# Patient Record
Sex: FEMALE | HISPANIC OR LATINO | Employment: FULL TIME | URBAN - METROPOLITAN AREA
[De-identification: names, ages, dates, MRNs, and addresses within clinical notes are randomized per-mention and may not be internally consistent; named-entity substitution may affect disease eponyms.]

---

## 2017-01-13 ENCOUNTER — OFFICE VISIT (OUTPATIENT)
Dept: MEDICAL GROUP | Facility: PHYSICIAN GROUP | Age: 22
End: 2017-01-13
Payer: COMMERCIAL

## 2017-01-13 VITALS
BODY MASS INDEX: 22.2 KG/M2 | SYSTOLIC BLOOD PRESSURE: 98 MMHG | HEIGHT: 60 IN | RESPIRATION RATE: 24 BRPM | HEART RATE: 72 BPM | TEMPERATURE: 98.1 F | DIASTOLIC BLOOD PRESSURE: 66 MMHG | WEIGHT: 113.1 LBS | OXYGEN SATURATION: 97 %

## 2017-01-13 DIAGNOSIS — E55.9 VITAMIN D DEFICIENCY: ICD-10-CM

## 2017-01-13 DIAGNOSIS — F32.89 OTHER DEPRESSION: ICD-10-CM

## 2017-01-13 DIAGNOSIS — L60.0 INGROWN NAIL: ICD-10-CM

## 2017-01-13 PROCEDURE — 99214 OFFICE O/P EST MOD 30 MIN: CPT | Performed by: FAMILY MEDICINE

## 2017-01-13 NOTE — MR AVS SNAPSHOT
Lucy Al   2017 10:00 AM   Office Visit   MRN: [id number]    Department:  [facility]   Dept Phone:  [phone]    Description:  Female : 1995   Provider:  Zoraida Chavez M.D.           Reason for Visit     Follow-Up review lab work       Allergies as of 2017     No Known Allergies      You were diagnosed with     Other depression   [2020886]       Vitamin D deficiency   [4644806]       Ingrown nail   [772720]         Vital Signs     Blood Pressure Pulse Temperature Respirations Height Weight    98/66 mmHg 72 36.7 °C (98.1 °F) 24 1.524 m (5') 51.3 kg (113 lb 1.5 oz)    Body Mass Index Oxygen Saturation Last Menstrual Period Smoking Status          22.09 kg/m2 97% 2016 Light Tobacco Smoker        Basic Information     Date Of Birth Sex Race Ethnicity Preferred Language    1995 Female  or   Origin (Swedish,Tristanian,Zambian,Honduran, etc) English      Your appointments     2017 10:00 AM   Established Patient with Zoraida Chavez M.D.   [facility] (--)    [address]  [address]  [place] [id number] [phone]           You will be receiving a confirmation call a few days before your appointment from our automated call confirmation system.            Mar 17, 2017  1:00 PM   Established Patient with Zoraida Chavez M.D.   [facility] (--)    [address]  [address]  [place] [id number] [phone]           You will be receiving a confirmation call a few days before your appointment from our automated call confirmation system.              Problem List              ICD-10-CM Priority Class Noted - Resolved    Depression F32.9   2010 - Present    Family history of bipolar disorder Z81.8   2010 - Present    History of substance abuse Z87.898   2016 - Present    Vitamin D deficiency E55.9   2017 - Present      Health Maintenance        Date Due Completion Dates    IMM HPV VACCINE  (1 of 3 - Female 3 Dose Series) 9/18/2006 ---    IMM PNEUMOCOCCAL 19-64 (ADULT) MEDIUM RISK SERIES (1 of 1 - PPSV23) 9/18/2014 ---    PAP SMEAR 9/18/2016 ---    IMM DTaP/Tdap/Td Vaccine (7 - Td) 10/9/2018 10/9/2008, 10/22/1999, 10/29/1996, 3/25/1996, 1/15/1996, 1995            Current Immunizations     Dtap Vaccine 10/22/1999, 10/29/1996, 3/25/1996, 1/15/1996, 1995    HIB Vaccine(PEDVAX) 10/29/1996, 3/25/1996, 1/15/1996, 1995    Hepatitis A Vaccine, Adult 4/15/2015, 10/9/2008    Hepatitis A Vaccine, Ped/Adol 10/9/2008    Hepatitis B Vaccine Non-Recombivax (Ped/Adol) 1995, 1995    Hepatitis B Vaccine Recombivax (Adol/Adult) 3/31/2015    INFLUENZA VACCINE H1N1 12/29/2009    Influenza LAIV (Nasal) 12/29/2009    Influenza TIV (IM) 10/25/2016, 12/8/2015, 3/30/2015, 12/19/2013    Influenza Vaccine Quad Inj (Pf) 10/25/2016    Influenza Vaccine Quad Inj (Preserved) 12/8/2015, 3/30/2015    MMR Vaccine 10/22/1999, 10/29/1996    OPV - Historical Data 10/22/1999, 3/25/1996, 1/15/1996, 1995    Tdap Vaccine 10/9/2008      Below and/or attached are the medications your provider expects you to take. Review all of your home medications and newly ordered medications with your provider and/or pharmacist. Follow medication instructions as directed by your provider and/or pharmacist. Please keep your medication list with you and share with your provider. Update the information when medications are discontinued, doses are changed, or new medications (including over-the-counter products) are added; and carry medication information at all times in the event of emergency situations     Allergies:  No Known Allergies          Medications  Valid as of: January 13, 2017 -  9:55 AM    Generic Name Brand Name Tablet Size Instructions for use    Cholecalciferol (Tab) Cholecalciferol 21378 UNITS Take 1 tablet by mouth every 7 days.        .                 Medicines prescribed today were sent to:     [id number] [place] [facility], [place] - [address]    [address] [facility] [place] [facility] [place] [address]    Phone: [phone] Fax: [phone]    Open 24 Hours?: No      Medication refill instructions:       If your prescription bottle indicates you have medication refills left, it is not necessary to call your provider’s office. Please contact your pharmacy and they will refill your medication.    If your prescription bottle indicates you do not have any refills left, you may request refills at any time through one of the following ways: The online [employer] system (except Urgent Care), by calling your provider’s office, or by asking your pharmacy to contact your provider’s office with a refill request. Medication refills are processed only during regular business hours and may not be available until the next business day. Your provider may request additional information or to have a follow-up visit with you prior to refilling your medication.   *Please Note: Medication refills are assigned a new Rx number when refilled electronically. Your pharmacy may indicate that no refills were authorized even though a new prescription for the same medication is available at the pharmacy. Please request the medicine by name with the pharmacy before contacting your provider for a refill.        Your To Do List     Future Labs/Procedures Complete By Expires    VITAMIN D,25 HYDROXY  As directed 1/14/2018      Instructions    Vitamin D Deficiency  Vitamin D is an important vitamin that your body needs. Having too little of it in your body is called a deficiency. A very bad deficiency can make your bones soft and can cause a condition called rickets.   Vitamin D is important to your body for different reasons, such as:   · It helps your body absorb 2 minerals called calcium and phosphorus.  · It helps make your bones healthy.  · It may prevent some diseases, such as diabetes and multiple sclerosis.  · It helps your muscles and heart.  You can get vitamin D in  several ways. It is a natural part of some foods. The vitamin is also added to some dairy products and cereals. Some people take vitamin D supplements. Also, your body makes vitamin D when you are in the sun. It changes the sun's rays into a form of the vitamin that your body can use.  CAUSES   · Not eating enough foods that contain vitamin D.  · Not getting enough sunlight.  · Having certain digestive system diseases that make it hard to absorb vitamin D. These diseases include Crohn's disease, chronic pancreatitis, and cystic fibrosis.  · Having a surgery in which part of the stomach or small intestine is removed.  · Being obese. Fat cells pull vitamin D out of your blood. That means that obese people may not have enough vitamin D left in their blood and in other body tissues.  · Having chronic kidney or liver disease.  RISK FACTORS  Risk factors are things that make you more likely to develop a vitamin D deficiency. They include:  · Being older.  · Not being able to get outside very much.  · Living in a nursing home.  · Having had broken bones.  · Having weak or thin bones (osteoporosis).  · Having a disease or condition that changes how your body absorbs vitamin D.  · Having dark skin.  · Some medicines such as seizure medicines or steroids.  · Being overweight or obese.  SYMPTOMS  Mild cases of vitamin D deficiency may not have any symptoms. If you have a very bad case, symptoms may include:  · Bone pain.  · Muscle pain.  · Falling often.  · Broken bones caused by a minor injury, due to osteoporosis.  DIAGNOSIS  A blood test is the best way to tell if you have a vitamin D deficiency.  TREATMENT  Vitamin D deficiency can be treated in different ways. Treatment for vitamin D deficiency depends on what is causing it. Options include:  · Taking vitamin D supplements.  · Taking a calcium supplement. Your caregiver will suggest what dose is best for you.  HOME CARE INSTRUCTIONS  · Take any supplements that your  caregiver prescribes. Follow the directions carefully. Take only the suggested amount.  · Have your blood tested 2 months after you start taking supplements.  · Eat foods that contain vitamin D. Healthy choices include:  ¨ Fortified dairy products, cereals, or juices. Fortified means vitamin D has been added to the food. Check the label on the package to be sure.  ¨ Fatty fish like salmon or trout.  ¨ Eggs.  ¨ Oysters.  · Do not use a tanning bed.  · Keep your weight at a healthy level. Lose weight if you need to.  · Keep all follow-up appointments. Your caregiver will need to perform blood tests to make sure your vitamin D deficiency is going away.  SEEK MEDICAL CARE IF:  · You have any questions about your treatment.  · You continue to have symptoms of vitamin D deficiency.  · You have nausea or vomiting.  · You are constipated.  · You feel confused.  · You have severe abdominal or back pain.  MAKE SURE YOU:  · Understand these instructions.  · Will watch your condition.  · Will get help right away if you are not doing well or get worse.     This information is not intended to replace advice given to you by your health care provider. Make sure you discuss any questions you have with your health care provider.     Document Released: 03/11/2013 Document Revised: 04/14/2014 Document Reviewed: 03/11/2013  [employer] Interactive Patient Education ©2016 Elsevier Inc.            [employer] Access Code: Activation code not generated  Current [employer] Status: Active

## 2017-01-13 NOTE — PROGRESS NOTES
"Subjective:   Lucy Al is a 21 y.o. female here today for follow-up depression and discuss lab results.    Depression  Stable. Patient describes her mood as \"good\" today. No SI/HI. Her parents are very supportive. Not interested in therapy, medications or substance abuse treatment programs.    Vitamin D deficiency  Vitamin D level at 10. Patient reports that she has had low vitamin D levels in the past. She has been taking over-the-counter supplement. No falls or fracture history.     Ingrown nail  Near the end of the visit, patient asks me to look at a nail on her right hand. She clipped her fingernails last night and thinks she may have cut one \"too short.\" Denies any discharge, fevers or vomiting. The tip of her right finger is tender to touch.    Current medicines (including changes today)  Current Outpatient Prescriptions   Medication Sig Dispense Refill   • Cholecalciferol 58170 UNITS Tab Take 1 tablet by mouth every 7 days. 4 Tab 1     No current facility-administered medications for this visit.     She  has a past medical history of Depression and Pericarditis.    ROS   No chest pain, no shortness of breath, no abdominal pain.     Objective:     Physical Exam:  Blood pressure 98/66, pulse 72, temperature 36.7 °C (98.1 °F), resp. rate 24, height 1.524 m (5'), weight 51.3 kg (113 lb 1.5 oz), last menstrual period 12/01/2016, SpO2 97 %. Body mass index is 22.09 kg/(m^2).   Constitutional: Alert, no distress.  Skin: Warm, dry, good turgor, no rashes in visible areas.  Eye: Conjunctiva clear, lids normal.  ENMT: Lips without lesions, good dentition, oropharynx clear.  Neck: Trachea midline, no masses, no thyromegaly.  Respiratory: Unlabored respiratory effort, lungs clear to auscultation, no wheezes, no ronchi.  Cardiovascular: Normal S1, S2, no murmur, no edema.  MSK: Right hand with short nails that are angled at the edges. Lateral edge of right third digit is slightly swollen. No expressible " drainage.  Psych: Alert and oriented x3, normal affect and mood.    Assessment and Plan:     1. Other depression  Stable. Denies any suicidal or homicidal ideation. Patient denies counseling or medication. Emphasized importance of healthy diet and exercise. Discussed that should the patient have any symptoms they should call suicide prevention hotline or report to the emergency room immediately.    2. Vitamin D deficiency  This is a new finding. Vitamin D levels are rather low. Will replete with cholecalciferol 50,000 I.U. weekly for 6-8 weeks. Repeat vitamin D level in 6 weeks.  - Cholecalciferol 97878 UNITS Tab; Take 1 tablet by mouth every 7 days.  Dispense: 4 Tab; Refill: 1  - VITAMIN D,25 HYDROXY; Future    3. Ingrown nail  Discussed appropriate nail hygiene. No signs of infection and therefore antibiotics are not indicated. Supportive care. If not improved, may need procedure.    Followup: Return in about 2 months (around 3/13/2017) for PAP.         PLEASE NOTE: This dictation was created using voice recognition software. I have made every reasonable attempt to correct obvious errors, but I expect that there are errors of grammar and possibly content that I did not discover before finalizing the note.

## 2017-01-13 NOTE — ASSESSMENT & PLAN NOTE
"Stable. Patient describes her mood as \"good\" today. No SI/HI. Her parents are very supportive. Not interested in therapy, medications or substance abuse treatment programs.  "

## 2017-01-13 NOTE — PATIENT INSTRUCTIONS
Vitamin D Deficiency  Vitamin D is an important vitamin that your body needs. Having too little of it in your body is called a deficiency. A very bad deficiency can make your bones soft and can cause a condition called rickets.   Vitamin D is important to your body for different reasons, such as:   · It helps your body absorb 2 minerals called calcium and phosphorus.  · It helps make your bones healthy.  · It may prevent some diseases, such as diabetes and multiple sclerosis.  · It helps your muscles and heart.  You can get vitamin D in several ways. It is a natural part of some foods. The vitamin is also added to some dairy products and cereals. Some people take vitamin D supplements. Also, your body makes vitamin D when you are in the sun. It changes the sun's rays into a form of the vitamin that your body can use.  CAUSES   · Not eating enough foods that contain vitamin D.  · Not getting enough sunlight.  · Having certain digestive system diseases that make it hard to absorb vitamin D. These diseases include Crohn's disease, chronic pancreatitis, and cystic fibrosis.  · Having a surgery in which part of the stomach or small intestine is removed.  · Being obese. Fat cells pull vitamin D out of your blood. That means that obese people may not have enough vitamin D left in their blood and in other body tissues.  · Having chronic kidney or liver disease.  RISK FACTORS  Risk factors are things that make you more likely to develop a vitamin D deficiency. They include:  · Being older.  · Not being able to get outside very much.  · Living in a nursing home.  · Having had broken bones.  · Having weak or thin bones (osteoporosis).  · Having a disease or condition that changes how your body absorbs vitamin D.  · Having dark skin.  · Some medicines such as seizure medicines or steroids.  · Being overweight or obese.  SYMPTOMS  Mild cases of vitamin D deficiency may not have any symptoms. If you have a very bad case, symptoms  may include:  · Bone pain.  · Muscle pain.  · Falling often.  · Broken bones caused by a minor injury, due to osteoporosis.  DIAGNOSIS  A blood test is the best way to tell if you have a vitamin D deficiency.  TREATMENT  Vitamin D deficiency can be treated in different ways. Treatment for vitamin D deficiency depends on what is causing it. Options include:  · Taking vitamin D supplements.  · Taking a calcium supplement. Your caregiver will suggest what dose is best for you.  HOME CARE INSTRUCTIONS  · Take any supplements that your caregiver prescribes. Follow the directions carefully. Take only the suggested amount.  · Have your blood tested 2 months after you start taking supplements.  · Eat foods that contain vitamin D. Healthy choices include:  ¨ Fortified dairy products, cereals, or juices. Fortified means vitamin D has been added to the food. Check the label on the package to be sure.  ¨ Fatty fish like salmon or trout.  ¨ Eggs.  ¨ Oysters.  · Do not use a tanning bed.  · Keep your weight at a healthy level. Lose weight if you need to.  · Keep all follow-up appointments. Your caregiver will need to perform blood tests to make sure your vitamin D deficiency is going away.  SEEK MEDICAL CARE IF:  · You have any questions about your treatment.  · You continue to have symptoms of vitamin D deficiency.  · You have nausea or vomiting.  · You are constipated.  · You feel confused.  · You have severe abdominal or back pain.  MAKE SURE YOU:  · Understand these instructions.  · Will watch your condition.  · Will get help right away if you are not doing well or get worse.     This information is not intended to replace advice given to you by your health care provider. Make sure you discuss any questions you have with your health care provider.     Document Released: 03/11/2013 Document Revised: 04/14/2014 Document Reviewed: 03/11/2013  [employer] Interactive Patient Education ©2016 [employer] Inc.

## 2017-01-13 NOTE — ASSESSMENT & PLAN NOTE
Vitamin D level at 10. Patient reports that she has had low vitamin D levels in the past. She has been taking over-the-counter supplement. No falls or fracture history.

## 2017-03-08 RX ORDER — ERGOCALCIFEROL 1.25 MG/1
CAPSULE ORAL
Qty: 4 CAP | Refills: 0 | OUTPATIENT
Start: 2017-03-08

## 2017-03-17 ENCOUNTER — HOSPITAL ENCOUNTER (OUTPATIENT)
Facility: MEDICAL CENTER | Age: 22
End: 2017-03-17
Attending: FAMILY MEDICINE
Payer: COMMERCIAL

## 2017-03-17 ENCOUNTER — HOSPITAL ENCOUNTER (OUTPATIENT)
Dept: LAB | Facility: MEDICAL CENTER | Age: 22
End: 2017-03-17
Attending: FAMILY MEDICINE
Payer: COMMERCIAL

## 2017-03-17 ENCOUNTER — OFFICE VISIT (OUTPATIENT)
Dept: MEDICAL GROUP | Facility: PHYSICIAN GROUP | Age: 22
End: 2017-03-17
Payer: COMMERCIAL

## 2017-03-17 VITALS
BODY MASS INDEX: 21.1 KG/M2 | HEIGHT: 61 IN | HEART RATE: 74 BPM | WEIGHT: 111.77 LBS | TEMPERATURE: 98.8 F | OXYGEN SATURATION: 96 % | DIASTOLIC BLOOD PRESSURE: 54 MMHG | RESPIRATION RATE: 20 BRPM | SYSTOLIC BLOOD PRESSURE: 92 MMHG

## 2017-03-17 DIAGNOSIS — N89.8 VAGINAL DISCHARGE: ICD-10-CM

## 2017-03-17 DIAGNOSIS — Z12.4 CERVICAL CANCER SCREENING: ICD-10-CM

## 2017-03-17 DIAGNOSIS — Z01.419 WELL WOMAN EXAM WITH ROUTINE GYNECOLOGICAL EXAM: ICD-10-CM

## 2017-03-17 DIAGNOSIS — E55.9 VITAMIN D DEFICIENCY: ICD-10-CM

## 2017-03-17 LAB
25(OH)D3 SERPL-MCNC: 36 NG/ML (ref 30–100)
CYTOLOGY REG CYTOL: NORMAL

## 2017-03-17 PROCEDURE — 99395 PREV VISIT EST AGE 18-39: CPT | Performed by: FAMILY MEDICINE

## 2017-03-17 PROCEDURE — 87480 CANDIDA DNA DIR PROBE: CPT

## 2017-03-17 PROCEDURE — 88175 CYTOPATH C/V AUTO FLUID REDO: CPT

## 2017-03-17 PROCEDURE — 87510 GARDNER VAG DNA DIR PROBE: CPT

## 2017-03-17 PROCEDURE — 82306 VITAMIN D 25 HYDROXY: CPT

## 2017-03-17 PROCEDURE — 87660 TRICHOMONAS VAGIN DIR PROBE: CPT

## 2017-03-17 PROCEDURE — 36415 COLL VENOUS BLD VENIPUNCTURE: CPT

## 2017-03-17 NOTE — MR AVS SNAPSHOT
"        Lucy Al   3/17/2017 1:00 PM   Office Visit   MRN: [id number]    Department:  [facility]   Dept Phone:  [phone]    Description:  Female : 1995   Provider:  Zoraida Chavez M.D.           Reason for Visit     Gynecologic Exam           Allergies as of 3/17/2017     No Known Allergies      You were diagnosed with     Well woman exam with routine gynecological exam   [440790]       Cervical cancer screening   [390675]       Vaginal discharge   [449865]       Vitamin D deficiency   [6707815]         Vital Signs     Blood Pressure Pulse Temperature Respirations Height Weight    92/54 mmHg 74 37.1 °C (98.8 °F) 20 1.537 m (5' 0.5\") 50.7 kg (111 lb 12.4 oz)    Body Mass Index Oxygen Saturation Smoking Status             21.46 kg/m2 96% Light Tobacco Smoker         Basic Information     Date Of Birth Sex Race Ethnicity Preferred Language    1995 Female  or   Origin (Congolese,Prydeinig,Singaporean,Trinidadian, etc) English      Your appointments     Mar 19, 2018  1:00 PM   ANNUAL EXAM PREVENTATIVE with Zoraida Chavez M.D.   [facility] (--)    [address]  [address]  [facility] [id number] [phone]              Problem List              ICD-10-CM Priority Class Noted - Resolved    Depression F32.9   2010 - Present    Family history of bipolar disorder Z81.8   2010 - Present    History of substance abuse Z87.898   2016 - Present    Vitamin D deficiency E55.9   2017 - Present      Health Maintenance        Date Due Completion Dates    IMM HPV VACCINE (1 of 3 - Female 3 Dose Series) 2006 ---    PAP SMEAR 2016 ---    IMM DTaP/Tdap/Td Vaccine (7 - Td) 10/9/2018 10/9/2008, 10/22/1999, 10/29/1996, 3/25/1996, 1/15/1996, 1995            Current Immunizations     Dtap Vaccine 10/22/1999, 10/29/1996, 3/25/1996, 1/15/1996, 1995    HIB Vaccine(PEDVAX) 10/29/1996, 3/25/1996, 1/15/1996, 1995    Hepatitis A Vaccine, " Adult 4/15/2015, 10/9/2008    Hepatitis A Vaccine, Ped/Adol 10/9/2008    Hepatitis B Vaccine Non-Recombivax (Ped/Adol) 1995, 1995    Hepatitis B Vaccine Recombivax (Adol/Adult) 3/31/2015    INFLUENZA VACCINE H1N1 12/29/2009    Influenza LAIV (Nasal) 12/29/2009    Influenza TIV (IM) 10/25/2016, 12/8/2015, 3/30/2015, 12/19/2013    Influenza Vaccine Quad Inj (Pf) 10/25/2016    Influenza Vaccine Quad Inj (Preserved) 12/8/2015, 3/30/2015    MMR Vaccine 10/22/1999, 10/29/1996    OPV - Historical Data 10/22/1999, 3/25/1996, 1/15/1996, 1995    Tdap Vaccine 10/9/2008      Below and/or attached are the medications your provider expects you to take. Review all of your home medications and newly ordered medications with your provider and/or pharmacist. Follow medication instructions as directed by your provider and/or pharmacist. Please keep your medication list with you and share with your provider. Update the information when medications are discontinued, doses are changed, or new medications (including over-the-counter products) are added; and carry medication information at all times in the event of emergency situations     Allergies:  No Known Allergies          Medications  Valid as of: March 17, 2017 -  1:31 PM    Generic Name Brand Name Tablet Size Instructions for use    .                 Medicines prescribed today were sent to:     [id number] [place], [place] - [address] [address] [facility] [address]    Phone: [phone] Fax: [phone]    Open 24 Hours?: No      Medication refill instructions:       If your prescription bottle indicates you have medication refills left, it is not necessary to call your provider’s office. Please contact your pharmacy and they will refill your medication.    If your prescription bottle indicates you do not have any refills left, you may request refills at any time through one of the following ways: The online [employer] system (except Urgent Care), by  calling your provider’s office, or by asking your pharmacy to contact your provider’s office with a refill request. Medication refills are processed only during regular business hours and may not be available until the next business day. Your provider may request additional information or to have a follow-up visit with you prior to refilling your medication.   *Please Note: Medication refills are assigned a new Rx number when refilled electronically. Your pharmacy may indicate that no refills were authorized even though a new prescription for the same medication is available at the pharmacy. Please request the medicine by name with the pharmacy before contacting your provider for a refill.        Your To Do List     Future Labs/Procedures Complete By Expires    THINPREP PAP, REFLEX HPV ON ASC-US AND ABOVE  As directed 3/17/2018    VAGINAL PATHOGENS DNA PANEL  As directed 3/17/2018         [employer] Access Code: Activation code not generated  Current [employer] Status: Active          Quit Tobacco Information     Do you want to quit using tobacco?    Quitting tobacco decreases risks of cancer, heart and lung disease, increases life expectancy, improves sense of taste and smell, and increases spending money, among other benefits.    If you are thinking about quitting, we can help.  • [employer] Quit Tobacco Program: [phone]  o Program occurs weekly for four weeks and includes pharmacist consultation on products to support quitting smoking or chewing tobacco. A provider referral is needed for pharmacist consultation.  • Tobacco Users Help Hotline: 8-188-QUIT-NOW (040-2588) or https://[place].quitlogix.org/  o Free, confidential telephone and online coaching for [place] residents. Sessions are designed on a schedule that is convenient for you. Eligible clients receive free nicotine replacement therapy.  • Nationally: www.smokefree.gov  o Information and professional assistance to support both immediate and long-term needs as  you become, and remain, a non-smoker. Smokefree.gov allows you to choose the help that best fits your needs.

## 2017-03-17 NOTE — PROGRESS NOTES
Subjective:     CC:   Chief Complaint   Patient presents with   • Gynecologic Exam     HPI:   Lucy Al is a 21 y.o. female who presents for annual exam. She is feeling well and denies any complaints.    Patient has GYN provider: no  Last pap: due  Last mammo: N/A  Last colonoscopy: N/A  Last bone density test: n/a  Last Tdap: October 2008  Gardiasil: unsure  Hx. STD's: no  Birth control: no    Menses every month with 5 days light bleeding.  Cramping is mild.   She does take OTC analgesics for cramps.  No significant bloating/fluid retention, pelvic pain, or dyspareunia. She has noticed vaginal discharge.  No breast tenderness, mass, nipple discharge, changes in size or contour, or abnormal cyclic discomfort.  She does not perform regular self breast exams.  Regular exercise: no   Diet: healthy    She  has a past medical history of Depression and Pericarditis.  She  has no past surgical history on file.    Family History   Problem Relation Age of Onset   • Alcohol/Drug Father      alcoholism   • Psychiatry Father      bipolar disorder       Social History     Social History   • Marital Status: Single     Spouse Name: N/A   • Number of Children: N/A   • Years of Education: N/A     Occupational History   • Not on file.     Social History Main Topics   • Smoking status: Light Tobacco Smoker   • Smokeless tobacco: Never Used      Comment: exposed to 1 smoker   • Alcohol Use: 0.0 oz/week     0 Standard drinks or equivalent per week      Comment: socially   • Drug Use: No      Comment: smoking Oxycontin pills since September, recently quit last week   • Sexual Activity:     Partners: Male     Birth Control/ Protection: Condom     Other Topics Concern   • Not on file     Social History Narrative       Patient Active Problem List    Diagnosis Date Noted   • Vitamin D deficiency 01/13/2017   • History of substance abuse 11/28/2016   • Depression 11/11/2010   • Family history of bipolar disorder 11/11/2010     No  "current outpatient prescriptions on file.     No current facility-administered medications for this visit.     No Known Allergies    Review of Systems   Constitutional: Negative for fever, chills and malaise/fatigue.   HENT: Negative for congestion.    Eyes: Negative for pain.   Respiratory: Negative for cough and shortness of breath.    Cardiovascular: Negative for leg swelling.   Gastrointestinal: Negative for nausea, vomiting, abdominal pain and diarrhea.   Genitourinary: Negative for dysuria and hematuria.   Skin: Negative for rash.   Neurological: Negative for dizziness, focal weakness and headaches.   Endo/Heme/Allergies: Does not bruise/bleed easily.   Psychiatric/Behavioral: Negative for depression.  The patient is not nervous/anxious.      Objective:     BP 92/54 mmHg  Pulse 74  Temp(Src) 37.1 °C (98.8 °F)  Resp 20  Ht 1.537 m (5' 0.5\")  Wt 50.7 kg (111 lb 12.4 oz)  BMI 21.46 kg/m2  SpO2 96%  Body mass index is 21.46 kg/(m^2).  Wt Readings from Last 4 Encounters:   03/17/17 50.7 kg (111 lb 12.4 oz)   01/13/17 51.3 kg (113 lb 1.5 oz)   12/15/16 49.624 kg (109 lb 6.4 oz)   11/28/16 47.3 kg (104 lb 4.4 oz)     Physical Exam:  Constitutional: Well-developed and well-nourished. Not diaphoretic. No distress.   Skin: Skin is warm and dry. No rash noted.  Head: Atraumatic without lesions.  Eyes: Conjunctivae and extraocular motions are normal. Pupils are equal, round, and reactive to light. No scleral icterus.   Ears:  External ears unremarkable. Tympanic membranes clear and intact.  Nose: Nares patent. Septum midline. Turbinates without erythema nor edema. No discharge.   Mouth/Throat: Dentition is good. Tongue normal. Oropharynx is clear and moist. Posterior pharynx without erythema or exudates.  Neck: Supple, trachea midline. Normal range of motion. No thyromegaly present. No lymphadenopathy--cervical or supraclavicular.  Cardiovascular: Regular rate and rhythm, S1 and S2 without murmur, rubs, or gallops. "    Breast: Patient declined.  Abdomen: Soft, non tender, and without distention. Active bowel sounds in all four quadrants. No rebound, guarding, masses or HSM.  : Perineum and external genitalia normal without rash. Vagina with grey-yellow discharge. Cervix without visible lesions or discharge. Bimanual exam not performed.  Extremities: No cyanosis, clubbing, erythema, nor edema. Distal pulses intact and symmetric.   Musculoskeletal: All major joints AROM full in all directions without pain.  Neurological: Alert and oriented x 3. DTRs 2+/3 and symmetric. No cranial nerve deficit. 5/5 myotomes. Sensation intact. Negative Rhomberg.  Psychiatric:  Behavior, mood, and affect are appropriate.    Assessment and Plan:     1. Well woman exam with routine gynecological exam  This is a healthy 21-year-old female who is here for a well woman exam. See below regarding anticipatory guidance.    THINPREP PAP, REFLEX HPV ON ASC-US AND ABOVE   2. Cervical cancer screening  Pap smear obtained today and sent to lab.     THINPREP PAP, REFLEX HPV ON ASC-US AND ABOVE   3. Vaginal discharge  AFFIRM obtained.     VAGINAL PATHOGENS DNA PANEL   4. Vitamin D deficiency  Patient completed 8 weeks of high-dose supplementation. Recheck vitamin D level.      HCM:  Up to date.   Labs per orders.  Immunizations per orders. Patient to check on HPV vaccinations.  Patient counseled about skin care, diet, supplements, prenatal vitamins, safe sex and exercise.    Follow-up: Return in about 1 year (around 3/17/2018) for Annual.

## 2017-03-18 LAB
CANDIDA DNA VAG QL PROBE+SIG AMP: POSITIVE
G VAGINALIS DNA VAG QL PROBE+SIG AMP: POSITIVE
T VAGINALIS DNA VAG QL PROBE+SIG AMP: NEGATIVE

## 2017-03-20 ENCOUNTER — TELEPHONE (OUTPATIENT)
Dept: MEDICAL GROUP | Facility: PHYSICIAN GROUP | Age: 22
End: 2017-03-20

## 2017-03-20 DIAGNOSIS — B37.31 VAGINAL CANDIDIASIS: ICD-10-CM

## 2017-03-20 RX ORDER — FLUCONAZOLE 150 MG/1
150 TABLET ORAL DAILY
Qty: 1 TAB | Refills: 0 | Status: SHIPPED | OUTPATIENT
Start: 2017-03-20 | End: 2017-03-21

## 2017-03-20 NOTE — TELEPHONE ENCOUNTER
Please let patient know her pap smear was normal. However, there was evidence of a yeast infection. I have sent in a prescription for her to take.  Zoraida Chavez M.D.

## 2017-03-20 NOTE — TELEPHONE ENCOUNTER
----- Message from Zoraida Chavez M.D. sent at 3/19/2017  5:27 PM PDT -----  Please let patient know her vitamin D level has increased from 10 to 36. She should continue taking a supplement, but two 1,000 I.U. Capsules.  Zoraida Chavez M.D.

## 2017-06-21 ENCOUNTER — OFFICE VISIT (OUTPATIENT)
Dept: MEDICAL GROUP | Facility: PHYSICIAN GROUP | Age: 22
End: 2017-06-21
Payer: COMMERCIAL

## 2017-06-21 VITALS
SYSTOLIC BLOOD PRESSURE: 102 MMHG | HEIGHT: 61 IN | RESPIRATION RATE: 16 BRPM | TEMPERATURE: 99 F | HEART RATE: 90 BPM | WEIGHT: 103.62 LBS | DIASTOLIC BLOOD PRESSURE: 70 MMHG | OXYGEN SATURATION: 99 % | BODY MASS INDEX: 19.56 KG/M2

## 2017-06-21 DIAGNOSIS — M72.2 PLANTAR FASCIITIS OF RIGHT FOOT: ICD-10-CM

## 2017-06-21 PROCEDURE — 99213 OFFICE O/P EST LOW 20 MIN: CPT | Performed by: FAMILY MEDICINE

## 2017-06-21 NOTE — MR AVS SNAPSHOT
"        Lucy Al   2017 3:40 PM   Office Visit   MRN: [id number]    Department:  [facility]   Dept Phone:  [phone]    Description:  Female : 1995   Provider:  Zoraida Chavez M.D.           Reason for Visit     Foot Pain right foot, since January, no injury      Allergies as of 2017     No Known Allergies      You were diagnosed with     Plantar fasciitis of right foot   [[id number]]         Vital Signs     Blood Pressure Pulse Temperature Respirations Height Weight    102/70 mmHg 90 37.2 °C (99 °F) 16 1.537 m (5' 0.5\") 47 kg (103 lb 9.9 oz)    Body Mass Index Oxygen Saturation Smoking Status             19.90 kg/m2 99% Light Tobacco Smoker         Basic Information     Date Of Birth Sex Race Ethnicity Preferred Language    1995 Female  or   Origin (Citizen of [place],T[place]ian,Chinese,E[place]ian, etc) English      Your appointments     2017  4:00 PM   Established Patient with Zoraida Chavez M.D.   [facility] (--)    [email or web address]  [address]  [place] [id number] [phone]           You will be receiving a confirmation call a few days before your appointment from our automated call confirmation system.            Mar 19, 2018  1:00 PM   ANNUAL EXAM PREVENTATIVE with Zoraida Chavez M.D.   [facility] (--)    [email or web address]  [address]  [place] [id number] [phone]              Problem List              ICD-10-CM Priority Class Noted - Resolved    Depression F32.9   2010 - Present    Family history of bipolar disorder Z81.8   2010 - Present    History of substance abuse Z87.898   2016 - Present    Vitamin D deficiency E55.9   2017 - Present    Plantar fasciitis of right foot M72.2   2017 - Present      Health Maintenance        Date Due Completion Dates    IMM HPV VACCINE (1 of 3 - Female 3 Dose Series) 2006 ---    IMM DTaP/Tdap/Td Vaccine (7 - Td) 10/9/2018 10/9/2008, 10/22/1999, 10/29/1996, " 3/25/1996, 1/15/1996, 1995    PAP SMEAR 3/17/2020 3/17/2017            Current Immunizations     Dtap Vaccine 10/22/1999, 10/29/1996, 3/25/1996, 1/15/1996, 1995    HIB Vaccine(PEDVAX) 10/29/1996, 3/25/1996, 1/15/1996, 1995    Hepatitis A Vaccine, Adult 4/15/2015, 10/9/2008    Hepatitis A Vaccine, Ped/Adol 10/9/2008    Hepatitis B Vaccine Non-Recombivax (Ped/Adol) 1995, 1995    Hepatitis B Vaccine Recombivax (Adol/Adult) 3/31/2015    INFLUENZA VACCINE H1N1 12/29/2009    Influenza LAIV (Nasal) 12/29/2009    Influenza TIV (IM) 10/25/2016, 12/8/2015, 3/30/2015, 12/19/2013    Influenza Vaccine Quad Inj (Pf) 10/25/2016    Influenza Vaccine Quad Inj (Preserved) 12/8/2015, 3/30/2015    MMR Vaccine 10/22/1999, 10/29/1996    OPV - Historical Data 10/22/1999, 3/25/1996, 1/15/1996, 1995    Tdap Vaccine 10/9/2008      Below and/or attached are the medications your provider expects you to take. Review all of your home medications and newly ordered medications with your provider and/or pharmacist. Follow medication instructions as directed by your provider and/or pharmacist. Please keep your medication list with you and share with your provider. Update the information when medications are discontinued, doses are changed, or new medications (including over-the-counter products) are added; and carry medication information at all times in the event of emergency situations     Allergies:  No Known Allergies          Medications  Valid as of: June 21, 2017 -  3:50 PM    Generic Name Brand Name Tablet Size Instructions for use    .                 Medicines prescribed today were sent to:     [id number] - [place] - [address]    [address] [facility] [place] [address]    Phone: [phone] Fax: [phone]    Open 24 Hours?: No      Medication refill instructions:       If your prescription bottle indicates you have medication refills left, it is not necessary to call your provider’s office.  Please contact your pharmacy and they will refill your medication.    If your prescription bottle indicates you do not have any refills left, you may request refills at any time through one of the following ways: The online SCADA Access system (except Urgent Care), by calling your provider’s office, or by asking your pharmacy to contact your provider’s office with a refill request. Medication refills are processed only during regular business hours and may not be available until the next business day. Your provider may request additional information or to have a follow-up visit with you prior to refilling your medication.   *Please Note: Medication refills are assigned a new Rx number when refilled electronically. Your pharmacy may indicate that no refills were authorized even though a new prescription for the same medication is available at the pharmacy. Please request the medicine by name with the pharmacy before contacting your provider for a refill.        Instructions    Plantar Fasciitis  Plantar fasciitis is a painful foot condition that affects the heel. It occurs when the band of tissue that connects the toes to the heel bone (plantar fascia) becomes irritated. This can happen after exercising too much or doing other repetitive activities (overuse injury). The pain from plantar fasciitis can range from mild irritation to severe pain that makes it difficult for you to walk or move. The pain is usually worse in the morning or after you have been sitting or lying down for a while.  CAUSES  This condition may be caused by:  · Standing for long periods of time.  · Wearing shoes that do not fit.  · Doing high-impact activities, including running, aerobics, and ballet.  · Being overweight.  · Having an abnormal way of walking (gait).  · Having tight calf muscles.  · Having high arches in your feet.  · Starting a new athletic activity.  SYMPTOMS  The main symptom of this condition is heel pain. Other symptoms  include:  · Pain that gets worse after activity or exercise.  · Pain that is worse in the morning or after resting.  · Pain that goes away after you walk for a few minutes.  DIAGNOSIS  This condition may be diagnosed based on your signs and symptoms. Your health care provider will also do a physical exam to check for:  · A tender area on the bottom of your foot.  · A high arch in your foot.  · Pain when you move your foot.  · Difficulty moving your foot.  You may also need to have imaging studies to confirm the diagnosis. These can include:  · X-rays.  · Ultrasound.  · MRI.  TREATMENT   Treatment for plantar fasciitis depends on the severity of the condition. Your treatment may include:  · Rest, ice, and over-the-counter pain medicines to manage your pain.  · Exercises to stretch your calves and your plantar fascia.  · A splint that holds your foot in a stretched, upward position while you sleep (night splint).  · Physical therapy to relieve symptoms and prevent problems in the future.  · Cortisone injections to relieve severe pain.  · Extracorporeal shockwave therapy (ESWT) to stimulate damaged plantar fascia with electrical impulses. It is often used as a last resort before surgery.  · Surgery, if other treatments have not worked after 12 months.  HOME CARE INSTRUCTIONS  · Take medicines only as directed by your health care provider.  · Avoid activities that cause pain.  · Roll the bottom of your foot over a bag of ice or a bottle of cold water. Do this for 20 minutes, 3-4 times a day.  · Perform simple stretches as directed by your health care provider.  · Try wearing athletic shoes with air-sole or gel-sole cushions or soft shoe inserts.  · Wear a night splint while sleeping, if directed by your health care provider.  · Keep all follow-up appointments with your health care provider.  PREVENTION   · Do not perform exercises or activities that cause heel pain.  · Consider finding low-impact activities if you  continue to have problems.  · Lose weight if you need to.  The best way to prevent plantar fasciitis is to avoid the activities that aggravate your plantar fascia.  SEEK MEDICAL CARE IF:  · Your symptoms do not go away after treatment with home care measures.  · Your pain gets worse.  · Your pain affects your ability to move or do your daily activities.     This information is not intended to replace advice given to you by your health care provider. Make sure you discuss any questions you have with your health care provider.     Document Released: 09/12/2002 Document Revised: 05/03/2016 Document Reviewed: 10/28/2015  [employer] Interactive Patient Education ©2016 [employer].            Trendyta Access Code: Activation code not generated  Current Trendyta Status: Active          Quit Tobacco Information     Do you want to quit using tobacco?    Quitting tobacco decreases risks of cancer, heart and lung disease, increases life expectancy, improves sense of taste and smell, and increases spending money, among other benefits.    If you are thinking about quitting, we can help.  • [employer] Quit Tobacco Program: 836.890.2388  o Program occurs weekly for four weeks and includes pharmacist consultation on products to support quitting smoking or chewing tobacco. A provider referral is needed for pharmacist consultation.  • Tobacco Users Help Hotline: 5-329-QUIT-NOW (778-2971) or https://[place].quitlogix.org/  o Free, confidential telephone and online coaching for [place] residents. Sessions are designed on a schedule that is convenient for you. Eligible clients receive free nicotine replacement therapy.  • Nationally: www.smokefree.gov  o Information and professional assistance to support both immediate and long-term needs as you become, and remain, a non-smoker. Smokefree.gov allows you to choose the help that best fits your needs.

## 2017-06-21 NOTE — PATIENT INSTRUCTIONS
Plantar Fasciitis  Plantar fasciitis is a painful foot condition that affects the heel. It occurs when the band of tissue that connects the toes to the heel bone (plantar fascia) becomes irritated. This can happen after exercising too much or doing other repetitive activities (overuse injury). The pain from plantar fasciitis can range from mild irritation to severe pain that makes it difficult for you to walk or move. The pain is usually worse in the morning or after you have been sitting or lying down for a while.  CAUSES  This condition may be caused by:  · Standing for long periods of time.  · Wearing shoes that do not fit.  · Doing high-impact activities, including running, aerobics, and ballet.  · Being overweight.  · Having an abnormal way of walking (gait).  · Having tight calf muscles.  · Having high arches in your feet.  · Starting a new athletic activity.  SYMPTOMS  The main symptom of this condition is heel pain. Other symptoms include:  · Pain that gets worse after activity or exercise.  · Pain that is worse in the morning or after resting.  · Pain that goes away after you walk for a few minutes.  DIAGNOSIS  This condition may be diagnosed based on your signs and symptoms. Your health care provider will also do a physical exam to check for:  · A tender area on the bottom of your foot.  · A high arch in your foot.  · Pain when you move your foot.  · Difficulty moving your foot.  You may also need to have imaging studies to confirm the diagnosis. These can include:  · X-rays.  · Ultrasound.  · MRI.  TREATMENT   Treatment for plantar fasciitis depends on the severity of the condition. Your treatment may include:  · Rest, ice, and over-the-counter pain medicines to manage your pain.  · Exercises to stretch your calves and your plantar fascia.  · A splint that holds your foot in a stretched, upward position while you sleep (night splint).  · Physical therapy to relieve symptoms and prevent problems in the  future.  · Cortisone injections to relieve severe pain.  · Extracorporeal shockwave therapy (ESWT) to stimulate damaged plantar fascia with electrical impulses. It is often used as a last resort before surgery.  · Surgery, if other treatments have not worked after 12 months.  HOME CARE INSTRUCTIONS  · Take medicines only as directed by your health care provider.  · Avoid activities that cause pain.  · Roll the bottom of your foot over a bag of ice or a bottle of cold water. Do this for 20 minutes, 3-4 times a day.  · Perform simple stretches as directed by your health care provider.  · Try wearing athletic shoes with air-sole or gel-sole cushions or soft shoe inserts.  · Wear a night splint while sleeping, if directed by your health care provider.  · Keep all follow-up appointments with your health care provider.  PREVENTION   · Do not perform exercises or activities that cause heel pain.  · Consider finding low-impact activities if you continue to have problems.  · Lose weight if you need to.  The best way to prevent plantar fasciitis is to avoid the activities that aggravate your plantar fascia.  SEEK MEDICAL CARE IF:  · Your symptoms do not go away after treatment with home care measures.  · Your pain gets worse.  · Your pain affects your ability to move or do your daily activities.     This information is not intended to replace advice given to you by your health care provider. Make sure you discuss any questions you have with your health care provider.     Document Released: 09/12/2002 Document Revised: 05/03/2016 Document Reviewed: 10/28/2015  [employer] Interactive Patient Education ©2016 [employer]

## 2017-06-21 NOTE — ASSESSMENT & PLAN NOTE
Patient has a had intermittent right foot pain since January. It is located on the bottom of her foot, near her heel. Worse in the morning. Improves throughout the day. Also worse with activity. She has been working more hours lately as some of her coworkers are on vacation. 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    Patient has not tried over-the-counter medications. She has been to see a chiropractor and has been wearing tennis shoes at work.    She is concerned because her right foot pain has been more persistent. She has had to walk with her foot turned out at work. She is also worried because nothing that she is doing at home has helped thus far.

## 2017-06-21 NOTE — Clinical Note
2017      To Whom It May Concern:    Re: Lucy Alatorre Mikaela; : 1995      Lucy is a patient of mine. Her most recent appointment with me was on 2017. She has right foot pain and I recommend that her working hours be limited to 24 hours for the next two weeks.     If you have any questions or concerns, please feel free to contact me at [phone].      Sincerely,        Zoraida Chavze M.D.

## 2017-06-21 NOTE — PROGRESS NOTES
"Subjective:   Lucy Al is a 21 y.o. female here today for right foot pain.    Right foot pain  Patient has a had intermittent right foot pain since January. It is located on the bottom of her foot, near her heel. Worse in the morning. Improves throughout the day. Also worse with activity. No injury or trauma. She has been working more hours lately as some of her coworkers are on vacation. 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    Patient has not tried over-the-counter medications. She has been to see a chiropractor and has been wearing tennis shoes at work.    She is concerned because her right foot pain has been more persistent. She has had to walk with her foot turned out at work. She is also worried because nothing that she is doing at home has helped thus far.     Current medicines (including changes today)  No current outpatient prescriptions on file.     No current facility-administered medications for this visit.     She  has a past medical history of Depression and Pericarditis.    ROS   See HPI. No chest pain, no shortness of breath, no abdominal pain.     Objective:     Physical Exam:  Blood pressure 102/70, pulse 90, temperature 37.2 °C (99 °F), resp. rate 16, height 1.537 m (5' 0.5\"), weight 47 kg (103 lb 9.9 oz), SpO2 99 %. Body mass index is 19.9 kg/(m^2).   Constitutional: Alert, no distress, non-toxic appearance.  Skin: Warm, dry, good turgor, no rashes in visible areas.  Eye: Conjunctiva clear, lids normal.  ENMT: Lips without lesions, good dentition, oropharynx clear.  Neck: Trachea midline, no masses, no thyromegaly.  Respiratory: Unlabored respiratory effort, no cough.  Extremities: No cyanosis, clubbing or edema.  Feet: No deformity or edema. Pain to palpation along sole of right foot, worse just distal to heel. Good ROM.  Psych: Alert and oriented x3, normal affect and mood.    Assessment and Plan:     1. Plantar fasciitis of right foot  New " problem for the patient. Most likely plantar fasciitis given the symptoms. Will refer to physical therapy or podiatry if needed. Gave the patient handout with regard to ice and stretching exercises. Continue to monitor.    Followup: Return in about 2 years (around 6/21/2019) for f/u plantar fasciitis, short.         PLEASE NOTE: This dictation was created using voice recognition software. I have made every reasonable attempt to correct obvious errors, but I expect that there are errors of grammar and possibly content that I did not discover before finalizing the note.

## 2017-07-05 ENCOUNTER — OFFICE VISIT (OUTPATIENT)
Dept: MEDICAL GROUP | Facility: PHYSICIAN GROUP | Age: 22
End: 2017-07-05
Payer: COMMERCIAL

## 2017-07-05 VITALS
OXYGEN SATURATION: 98 % | SYSTOLIC BLOOD PRESSURE: 102 MMHG | BODY MASS INDEX: 20.03 KG/M2 | TEMPERATURE: 98.6 F | HEART RATE: 86 BPM | HEIGHT: 60 IN | DIASTOLIC BLOOD PRESSURE: 74 MMHG | RESPIRATION RATE: 16 BRPM | WEIGHT: 102 LBS

## 2017-07-05 DIAGNOSIS — Z28.21 VACCINATION REFUSED BY PATIENT: ICD-10-CM

## 2017-07-05 DIAGNOSIS — M72.2 PLANTAR FASCIITIS OF RIGHT FOOT: ICD-10-CM

## 2017-07-05 PROCEDURE — 99213 OFFICE O/P EST LOW 20 MIN: CPT | Performed by: FAMILY MEDICINE

## 2017-07-05 NOTE — PROGRESS NOTES
Subjective:   Lucy Al is a 21 y.o. female here today for follow-up plantar fasciitis.    Plantar fasciitis of right foot  Stable. No significant changes. However, her right foot has been bothering her less because she has recently switched positions to unit clerk. She is sitting more and not walking around the hospital as much. Has been taking ibuprofen as needed.     Due for HPV vaccine  Patient is due for the HPV vaccine series. We discussed the indications, benefits and risks. She declines today.    Current medicines (including changes today)  No current outpatient prescriptions on file.     No current facility-administered medications for this visit.     She  has a past medical history of Depression and Pericarditis.    ROS   No chest pain, no shortness of breath, no abdominal pain.     Objective:     Physical Exam:  Blood pressure 102/74, pulse 86, temperature 37 °C (98.6 °F), resp. rate 16, height 1.524 m (5'), weight 46.267 kg (102 lb), last menstrual period 05/05/2017, SpO2 98 %. Body mass index is 19.92 kg/(m^2).   Constitutional: Alert, no distress.  Skin: Warm, dry, good turgor, no rashes in visible areas.  Eye: Equal, round and reactive, conjunctiva clear, lids normal.  ENMT: Lips without lesions, good dentition, oropharynx clear.  Neck: Trachea midline, no masses, no thyromegaly.  Respiratory: Unlabored respiratory effort, no cough.  MSK: Normal gait. ISABEL.  Psych: Alert and oriented x3, normal affect and mood.    Assessment and Plan:     1. Plantar fasciitis of right foot  Stable. Encouraged patient to wear supportive footwear and continue stretching exercises. No indication for podiatry or PT referral at this time.    2. Vaccination refused by patient  Patient declines HPV vaccine series. Information hand-out provided and patient encouraged to call if she changes her mind.    Followup: Return if symptoms worsen or fail to improve.         PLEASE NOTE: This dictation was created using  voice recognition software. I have made every reasonable attempt to correct obvious errors, but I expect that there are errors of grammar and possibly content that I did not discover before finalizing the note.

## 2017-07-05 NOTE — PATIENT INSTRUCTIONS
HPV Vaccine  Questions and Answers  WHAT IS HUMAN PAPILLOMAVIRUS (HPV)?  HPV is a virus that can lead to cervical cancer; vulvar and vaginal cancers; penile cancer; anal cancer and genital warts (warts in the genital areas). More than 1 vaccine is available to help you or your child with protection against HPV. Your caregiver can talk to you about which one might give you the best protection.  WHO SHOULD GET THIS VACCINE?  The HPV vaccine is most effective when given before the onset of sexual activity.  · This vaccine is recommended for girls 11 or 12 years of age. It can be given to girls as young as 9 years old.   · HPV vaccine can be given to males, 9 through 26 years of age, to reduce the likelihood of acquiring genital warts.   · HPV vaccine can be given to males and females aged 9 through 26 years to prevent anal cancer.   HPV vaccine is not generally recommended after age 26, because most individuals have been exposed to the HPV virus by that age.  HOW EFFECTIVE IS THIS VACCINE?   The vaccine is generally effective in preventing cervical; vulvar and vaginal cancers; penile cancer; anal cancer and genital warts caused by 4 types of HPV. The vaccine is less effective in those individuals who are already infected with HPV. This vaccine does not treat existing HPV, genital warts, pre-cancers or cancers.  WILL SEXUALLY ACTIVE INDIVIDUALS BENEFIT FROM THE VACCINE?  Sexually active individuals may still benefit from the vaccine but may get less benefit due to previous HPV exposure.  HOW AND WHEN IS THE VACCINE ADMINISTERED?  The vaccine is given in a series of 3 injections (shots) over a 6 month period in both males and females. The exact timing depends on which specific vaccine your caregiver recommends for you.  IS THE HPV VACCINE SAFE?   The federal government has approved the HPV vaccine as safe and effective. This vaccine was tested in both males and females in many countries around the world. The most common  side effect is soreness at the injection site. Since the drug became approved, there has been some concern about patients passing out after being vaccinated, which has led to a recommendation of a 15 minute waiting period following vaccination. This practice may decrease the small risk of passing out.  Additionally there is a rare risk of anaphylaxis (an allergic reaction) to the vaccine and a risk of a blood clot among individuals with specific risk factors for a blood clot.  DOES THIS VACCINE CONTAIN THIMEROSAL OR MERCURY?  No. There is no thimerosal or mercury in the HPV vaccine. It is made of proteins from the outer coat of the virus (HPV). There is no infectious material in this vaccine.  WILL GIRLS/WOMEN WHO HAVE BEEN VACCINATED STILL NEED CERVICAL CANCER SCREENING?  Yes. There are 3 reasons why women will still need regular cervical cancer screening. First, the vaccine will NOT provide protection against all types of HPV that cause cervical cancer. Vaccinated women will still be at risk for some cancers. Second, some women may not get all required doses of the vaccine (or they may not get them at the recommended times). Therefore, they may not get the vaccine's full benefits. Third, women may not get the full benefit of the vaccine if they receive it after they have already acquired any of the 4 types of HPV.  WILL THE HPV VACCINE BE COVERED BY INSURANCE PLANS?  While some insurance companies may cover the vaccine, others may not. Most large group insurance plans cover the costs of recommended vaccines.  WHAT KIND OF GOVERNMENT PROGRAMS MAY BE AVAILABLE TO COVER HPV VACCINE?  Federal health programs such as Vaccines for Children (VFC) will cover the HPV vaccine. The VFC program provides free vaccines to children and adolescents under 19 years of age, who are either uninsured, Medicaid-eligible,  or . There are over 45,000 sites that provide VFC vaccines including hospital, private  and public clinics. The VFC program also allows children and adolescents to get VFC vaccines through [facility] or [facility] if their private health insurance does not cover the vaccine. Some states also provide free or low-cost vaccines, at public health clinics, to people without health insurance coverage for vaccines.  GENITAL HPV: WHY IS HPV IMPORTANT?  Genital HPV is the most common virus transmitted through genital contact, most often during vaginal and anal sex. About 40 types of HPV can infect the genital areas of men and women. While most HPV types cause no symptoms and go away on their own, some types can cause cervical cancer in women. These types also cause other less common genital cancers, including cancers of the penis, anus, vagina (birth canal), and vulva (area around the opening of the vagina). Other types of HPV can cause genital warts in men and women.  HOW COMMON IS HPV?   · At least 50% of sexually active people will get HPV at some time in their lives. HPV is most common in young women and men who are in their late teens and early 20s.   · Anyone who has ever had genital contact with another person can get HPV. Both men and women can get it and pass it on to their sex partners without realizing it.   IS HPV THE SAME THING AS HIV OR HERPES?  HPV is NOT the same as HIV or Herpes (Herpes simplex virus or HSV). While these are all viruses that can be sexually transmitted, HIV and HSV do not cause the same symptoms or health problems as HPV.  CAN HPV AND ITS ASSOCIATED DISEASES BE TREATED?  There is no treatment for HPV. There are treatments for the health problems that HPV can cause, such as genital warts, cervical cell changes, and cancers of the cervix (lower part of the womb), vulva, vagina and anus.   HOW IS HPV RELATED TO CERVICAL CANCER?  Some types of HPV can infect a woman's cervix and cause the cells to change in an abnormal way. Most of the time, HPV  goes away on its own. When HPV is gone, the cervical cells go back to normal. Sometimes, HPV does not go away. Instead, it lingers (persists) and continues to change the cells on a woman's cervix. These cell changes can lead to cancer over time if they are not treated.  ARE THERE OTHER WAYS TO PREVENT CERVICAL CANCER?  Regular Pap tests and follow-up can prevent most, but not all, cases of cervical cancer. Pap tests can detect cell changes (or pre-cancers) in the cervix before they turn into cancer. Pap tests can also detect most, but not all, cervical cancers at an early, curable stage. Most women diagnosed with cervical cancer have either never had a Pap test, or not had a Pap test in the last 5 years.  There is also an HPV DNA test available for use with the Pap test as part of cervical cancer screening. This test may be ordered for women over 30 or for women who get an unclear (borderline) Pap test result. While this test can tell if a woman has HPV on her cervix, it cannot tell which types of HPV she has.  If the HPV DNA test is negative for HPV DNA, then screening may be done every 3 years. If the HPV DNA test is positive for HPV DNA, then screening should be done every 6 to 12 months.  OTHER QUESTIONS ABOUT THE HPV VACCINE  WHAT HPV TYPES DOES THE VACCINE PROTECT AGAINST?  The HPV vaccine protects against the HPV types that cause most (70%) cervical cancers (types 16 and 18), most (78%) anal cancers (types 16 and 18) and the two HPV types that cause most (90%) genital warts (types 6 and 11).  WHAT DOES THE VACCINE NOT PROTECT AGAINST?   Because the vaccine does not protect against all types of HPV, it will not prevent all cases of cervical cancer, anal cancer, other genital cancers or genital warts. About 30% of cervical cancers are not prevented with vaccination, so it will be important for women to continue screening for cervical cancer (regular Pap tests). Also, the vaccine does not prevent about 10% of  genital warts nor will it prevent other sexually transmitted infections (STIs), including HIV. Therefore, it will still be important for sexually active adults to practice safe sex to reduce exposure to HPV and other STI's.  HOW LONG DOES VACCINE PROTECTION LAST? WILL A BOOSTER SHOT BE NEEDED?  So far, studies have followed women for 5 years and found that they are still protected. Currently, additional (booster) doses are not recommended. More research is being done to find out how long protection will last, and if a booster vaccine is needed years later.   WHY IS THE HPV VACCINE RECOMMENDED AT SUCH A YOUNG AGE?  Ideally, males and females should get the vaccine before they are sexually active since this vaccine is most effective in individuals who have not yet acquired any of the HPV vaccine types. Individuals who have not been infected with any of the 4 types of HPV will get the full benefits of the vaccine.   SHOULD PREGNANT WOMEN BE VACCINATED?  The vaccine is not recommended for pregnant women. There has been limited research looking at vaccine safety for pregnant women and their developing fetus. Studies suggest that the vaccine has not caused health problems during pregnancy, nor has it caused health problems for the infant. Pregnant women should complete their pregnancy before getting the vaccine. If a woman finds out she is pregnant after she has started getting the vaccine series, she should complete her pregnancy before finishing the 3 doses.  SHOULD BREASTFEEDING MOTHERS BE VACCINATED?  Mothers nursing their babies may get the vaccine because the virus is inactivated and will not harm the mother or baby.  WILL INDIVIDUALS BE PROTECTED AGAINST HPV AND RELATED DISEASES, EVEN IF THEY DO NOT GET ALL 3 DOSES?  It is not yet known how much protection individuals will get from receiving only 1 or 2 doses of the vaccine. For this reason, it is very important that individuals get all 3 doses of the  vaccine.  WILL CHILDREN BE REQUIRED TO BE VACCINATED TO ENTER SCHOOL?  There are no federal laws that require children or adolescents to get vaccinated. All school entry laws are state laws so they vary from state to state. To find out what vaccines are needed for children or adolescents to enter school in your state, check with your state health department or board of education.  ARE THERE OTHER WAYS TO PREVENT HPV?  The only sure way to prevent HPV is to abstain from all sexual activity. Sexually active adults can reduce their risk by being in a mutually monogamous relationship with someone who has had no other sex partners. But even individuals with only 1 lifetime sex partner can get HPV, if their partner has had a previous partner with HPV.  It is unknown how much protection condoms provide against HPV, since areas that are not covered by a condom can be exposed to the virus. However, condoms may reduce the risk of genital warts and cervical cancer. They can also reduce the risk of HIV and some other sexually transmitted infections (STIs), when used consistently and correctly (all the time and the right way).  Document Released: 12/18/2006 Document Revised: 03/11/2013 Document Reviewed: 08/13/2010  [employer]® Patient Information ©2013 [email or web address].

## 2017-07-05 NOTE — MR AVS SNAPSHOT
"        Lucy Al   2017 4:00 PM   Office Visit   MRN: [id number]    Department:  [facility]   Dept Phone:  [phone]    Description:  Female : 1995   Provider:  Zoraida Chavez M.D.           Reason for Visit     Follow-Up \" I am here today to follow up on my plantar fasciitis\"    Immunizations \" Do I need HPV vaccine? \"      Allergies as of 2017     No Known Allergies      You were diagnosed with     Plantar fasciitis of right foot   [[id number]]         Vital Signs     Blood Pressure Pulse Temperature Respirations Height Weight    102/74 mmHg 86 37 °C (98.6 °F) 16 1.524 m (5') 46.267 kg (102 lb)    Body Mass Index Oxygen Saturation Last Menstrual Period Smoking Status          19.92 kg/m2 98% 2017 Light Tobacco Smoker        Basic Information     Date Of Birth Sex Race Ethnicity Preferred Language    1995 Female  or   Origin (Togolese,Italian,Belarusian,[place] Helenian, etc) English      Your appointments     Mar 19, 2018  1:00 PM   ANNUAL EXAM PREVENTATIVE with Zoraida Chavez M.D.   [facility] (--)    [address]  [address]  [place] [id number] [phone]              Problem List              ICD-10-CM Priority Class Noted - Resolved    Depression F32.9   2010 - Present    Family history of bipolar disorder Z81.8   2010 - Present    History of substance abuse Z87.898   2016 - Present    Vitamin D deficiency E55.9   2017 - Present    Plantar fasciitis of right foot M72.2   2017 - Present      Health Maintenance        Date Due Completion Dates    IMM HPV VACCINE (1 of 3 - Female 3 Dose Series) 2018 (Originally 2006) ---    IMM INFLUENZA (1) 2017 10/25/2016, 10/25/2016, 2015, 2015, 3/30/2015, 3/30/2015, 2013, 2009    IMM DTaP/Tdap/Td Vaccine (7 - Td) 10/9/2018 10/9/2008, 10/22/1999, 10/29/1996, 3/25/1996, 1/15/1996, 1995    PAP SMEAR 3/17/2020 3/17/2017            Current " Immunizations     Dtap Vaccine 10/22/1999, 10/29/1996, 3/25/1996, 1/15/1996, 1995    HIB Vaccine(PEDVAX) 10/29/1996, 3/25/1996, 1/15/1996, 1995    Hepatitis A Vaccine, Adult 4/15/2015, 10/9/2008    Hepatitis A Vaccine, Ped/Adol 10/9/2008    Hepatitis B Vaccine Non-Recombivax (Ped/Adol) 1995, 1995    Hepatitis B Vaccine Recombivax (Adol/Adult) 3/31/2015    INFLUENZA VACCINE H1N1 12/29/2009    Influenza LAIV (Nasal) 12/29/2009    Influenza TIV (IM) 10/25/2016, 12/8/2015, 3/30/2015, 12/19/2013    Influenza Vaccine Quad Inj (Pf) 10/25/2016    Influenza Vaccine Quad Inj (Preserved) 12/8/2015, 3/30/2015    MMR Vaccine 10/22/1999, 10/29/1996    OPV - Historical Data 10/22/1999, 3/25/1996, 1/15/1996, 1995    Tdap Vaccine 10/9/2008      Below and/or attached are the medications your provider expects you to take. Review all of your home medications and newly ordered medications with your provider and/or pharmacist. Follow medication instructions as directed by your provider and/or pharmacist. Please keep your medication list with you and share with your provider. Update the information when medications are discontinued, doses are changed, or new medications (including over-the-counter products) are added; and carry medication information at all times in the event of emergency situations     Allergies:  No Known Allergies          Medications  Valid as of: July 05, 2017 -  4:05 PM    Generic Name Brand Name Tablet Size Instructions for use    .                 Medicines prescribed today were sent to:     [id number] - [place], [place] - [address]    [address] [place] [address]    Phone: [phone] Fax: [phone]    Open 24 Hours?: No      Medication refill instructions:       If your prescription bottle indicates you have medication refills left, it is not necessary to call your provider’s office. Please contact your pharmacy and they will refill your medication.    If your  prescription bottle indicates you do not have any refills left, you may request refills at any time through one of the following ways: The online [facility] system (except Urgent Care), by calling your provider’s office, or by asking your pharmacy to contact your provider’s office with a refill request. Medication refills are processed only during regular business hours and may not be available until the next business day. Your provider may request additional information or to have a follow-up visit with you prior to refilling your medication.   *Please Note: Medication refills are assigned a new Rx number when refilled electronically. Your pharmacy may indicate that no refills were authorized even though a new prescription for the same medication is available at the pharmacy. Please request the medicine by name with the pharmacy before contacting your provider for a refill.        Instructions    HPV Vaccine  Questions and Answers  WHAT IS HUMAN PAPILLOMAVIRUS (HPV)?  HPV is a virus that can lead to cervical cancer; vulvar and vaginal cancers; penile cancer; anal cancer and genital warts (warts in the genital areas). More than 1 vaccine is available to help you or your child with protection against HPV. Your caregiver can talk to you about which one might give you the best protection.  WHO SHOULD GET THIS VACCINE?  The HPV vaccine is most effective when given before the onset of sexual activity.  · This vaccine is recommended for girls 11 or 12 years of age. It can be given to girls as young as 9 years old.   · HPV vaccine can be given to males, 9 through 26 years of age, to reduce the likelihood of acquiring genital warts.   · HPV vaccine can be given to males and females aged 9 through 26 years to prevent anal cancer.   HPV vaccine is not generally recommended after age 26, because most individuals have been exposed to the HPV virus by that age.  HOW EFFECTIVE IS THIS VACCINE?   The vaccine is generally effective in  preventing cervical; vulvar and vaginal cancers; penile cancer; anal cancer and genital warts caused by 4 types of HPV. The vaccine is less effective in those individuals who are already infected with HPV. This vaccine does not treat existing HPV, genital warts, pre-cancers or cancers.  WILL SEXUALLY ACTIVE INDIVIDUALS BENEFIT FROM THE VACCINE?  Sexually active individuals may still benefit from the vaccine but may get less benefit due to previous HPV exposure.  HOW AND WHEN IS THE VACCINE ADMINISTERED?  The vaccine is given in a series of 3 injections (shots) over a 6 month period in both males and females. The exact timing depends on which specific vaccine your caregiver recommends for you.  IS THE HPV VACCINE SAFE?   The federal government has approved the HPV vaccine as safe and effective. This vaccine was tested in both males and females in many countries around the world. The most common side effect is soreness at the injection site. Since the drug became approved, there has been some concern about patients passing out after being vaccinated, which has led to a recommendation of a 15 minute waiting period following vaccination. This practice may decrease the small risk of passing out.  Additionally there is a rare risk of anaphylaxis (an allergic reaction) to the vaccine and a risk of a blood clot among individuals with specific risk factors for a blood clot.  DOES THIS VACCINE CONTAIN THIMEROSAL OR MERCURY?  No. There is no thimerosal or mercury in the HPV vaccine. It is made of proteins from the outer coat of the virus (HPV). There is no infectious material in this vaccine.  WILL GIRLS/WOMEN WHO HAVE BEEN VACCINATED STILL NEED CERVICAL CANCER SCREENING?  Yes. There are 3 reasons why women will still need regular cervical cancer screening. First, the vaccine will NOT provide protection against all types of HPV that cause cervical cancer. Vaccinated women will still be at risk for some cancers. Second, some  women may not get all required doses of the vaccine (or they may not get them at the recommended times). Therefore, they may not get the vaccine's full benefits. Third, women may not get the full benefit of the vaccine if they receive it after they have already acquired any of the 4 types of HPV.  WILL THE HPV VACCINE BE COVERED BY INSURANCE PLANS?  While some insurance companies may cover the vaccine, others may not. Most large group insurance plans cover the costs of recommended vaccines.  WHAT KIND OF GOVERNMENT PROGRAMS MAY BE AVAILABLE TO COVER HPV VACCINE?  Federal health programs such as Vaccines for Children (VFC) will cover the HPV vaccine. The VFC program provides free vaccines to children and adolescents under 19 years of age, who are either uninsured, Medicaid-eligible,  or . There are over 45,000 sites that provide VFC vaccines including hospital, private and public clinics. The VFC program also allows children and adolescents to get VFC vaccines through [facility] or [facility] if their private health insurance does not cover the vaccine. Some states also provide free or low-cost vaccines, at public health clinics, to people without health insurance coverage for vaccines.  GENITAL HPV: WHY IS HPV IMPORTANT?  Genital HPV is the most common virus transmitted through genital contact, most often during vaginal and anal sex. About 40 types of HPV can infect the genital areas of men and women. While most HPV types cause no symptoms and go away on their own, some types can cause cervical cancer in women. These types also cause other less common genital cancers, including cancers of the penis, anus, vagina (birth canal), and vulva (area around the opening of the vagina). Other types of HPV can cause genital warts in men and women.  HOW COMMON IS HPV?   · At least 50% of sexually active people will get HPV at some time in their lives. HPV is most  common in young women and men who are in their late teens and early 20s.   · Anyone who has ever had genital contact with another person can get HPV. Both men and women can get it and pass it on to their sex partners without realizing it.   IS HPV THE SAME THING AS HIV OR HERPES?  HPV is NOT the same as HIV or Herpes (Herpes simplex virus or HSV). While these are all viruses that can be sexually transmitted, HIV and HSV do not cause the same symptoms or health problems as HPV.  CAN HPV AND ITS ASSOCIATED DISEASES BE TREATED?  There is no treatment for HPV. There are treatments for the health problems that HPV can cause, such as genital warts, cervical cell changes, and cancers of the cervix (lower part of the womb), vulva, vagina and anus.   HOW IS HPV RELATED TO CERVICAL CANCER?  Some types of HPV can infect a woman's cervix and cause the cells to change in an abnormal way. Most of the time, HPV goes away on its own. When HPV is gone, the cervical cells go back to normal. Sometimes, HPV does not go away. Instead, it lingers (persists) and continues to change the cells on a woman's cervix. These cell changes can lead to cancer over time if they are not treated.  ARE THERE OTHER WAYS TO PREVENT CERVICAL CANCER?  Regular Pap tests and follow-up can prevent most, but not all, cases of cervical cancer. Pap tests can detect cell changes (or pre-cancers) in the cervix before they turn into cancer. Pap tests can also detect most, but not all, cervical cancers at an early, curable stage. Most women diagnosed with cervical cancer have either never had a Pap test, or not had a Pap test in the last 5 years.  There is also an HPV DNA test available for use with the Pap test as part of cervical cancer screening. This test may be ordered for women over 30 or for women who get an unclear (borderline) Pap test result. While this test can tell if a woman has HPV on her cervix, it cannot tell which types of HPV she has.  If the HPV  DNA test is negative for HPV DNA, then screening may be done every 3 years. If the HPV DNA test is positive for HPV DNA, then screening should be done every 6 to 12 months.  OTHER QUESTIONS ABOUT THE HPV VACCINE  WHAT HPV TYPES DOES THE VACCINE PROTECT AGAINST?  The HPV vaccine protects against the HPV types that cause most (70%) cervical cancers (types 16 and 18), most (78%) anal cancers (types 16 and 18) and the two HPV types that cause most (90%) genital warts (types 6 and 11).  WHAT DOES THE VACCINE NOT PROTECT AGAINST?   Because the vaccine does not protect against all types of HPV, it will not prevent all cases of cervical cancer, anal cancer, other genital cancers or genital warts. About 30% of cervical cancers are not prevented with vaccination, so it will be important for women to continue screening for cervical cancer (regular Pap tests). Also, the vaccine does not prevent about 10% of genital warts nor will it prevent other sexually transmitted infections (STIs), including HIV. Therefore, it will still be important for sexually active adults to practice safe sex to reduce exposure to HPV and other STI's.  HOW LONG DOES VACCINE PROTECTION LAST? WILL A BOOSTER SHOT BE NEEDED?  So far, studies have followed women for 5 years and found that they are still protected. Currently, additional (booster) doses are not recommended. More research is being done to find out how long protection will last, and if a booster vaccine is needed years later.   WHY IS THE HPV VACCINE RECOMMENDED AT SUCH A YOUNG AGE?  Ideally, males and females should get the vaccine before they are sexually active since this vaccine is most effective in individuals who have not yet acquired any of the HPV vaccine types. Individuals who have not been infected with any of the 4 types of HPV will get the full benefits of the vaccine.   SHOULD PREGNANT WOMEN BE VACCINATED?  The vaccine is not recommended for pregnant women. There has been limited  research looking at vaccine safety for pregnant women and their developing fetus. Studies suggest that the vaccine has not caused health problems during pregnancy, nor has it caused health problems for the infant. Pregnant women should complete their pregnancy before getting the vaccine. If a woman finds out she is pregnant after she has started getting the vaccine series, she should complete her pregnancy before finishing the 3 doses.  SHOULD BREASTFEEDING MOTHERS BE VACCINATED?  Mothers nursing their babies may get the vaccine because the virus is inactivated and will not harm the mother or baby.  WILL INDIVIDUALS BE PROTECTED AGAINST HPV AND RELATED DISEASES, EVEN IF THEY DO NOT GET ALL 3 DOSES?  It is not yet known how much protection individuals will get from receiving only 1 or 2 doses of the vaccine. For this reason, it is very important that individuals get all 3 doses of the vaccine.  WILL CHILDREN BE REQUIRED TO BE VACCINATED TO ENTER SCHOOL?  There are no federal laws that require children or adolescents to get vaccinated. All school entry laws are state laws so they vary from state to state. To find out what vaccines are needed for children or adolescents to enter school in your state, check with your state health department or board of education.  ARE THERE OTHER WAYS TO PREVENT HPV?  The only sure way to prevent HPV is to abstain from all sexual activity. Sexually active adults can reduce their risk by being in a mutually monogamous relationship with someone who has had no other sex partners. But even individuals with only 1 lifetime sex partner can get HPV, if their partner has had a previous partner with HPV.  It is unknown how much protection condoms provide against HPV, since areas that are not covered by a condom can be exposed to the virus. However, condoms may reduce the risk of genital warts and cervical cancer. They can also reduce the risk of HIV and some other sexually transmitted infections  (STIs), when used consistently and correctly (all the time and the right way).  Document Released: 12/18/2006 Document Revised: 03/11/2013 Document Reviewed: 08/13/2010  ExitCare® Patient Information ©2013 [employer].          MyChart Access Code: Activation code not generated  Current MyChart Status: Active          Quit Tobacco Information     Do you want to quit using tobacco?    Quitting tobacco decreases risks of cancer, heart and lung disease, increases life expectancy, improves sense of taste and smell, and increases spending money, among other benefits.    If you are thinking about quitting, we can help.  • [facility] Quit Tobacco Program: 831-966-1139  o Program occurs weekly for four weeks and includes pharmacist consultation on products to support quitting smoking or chewing tobacco. A provider referral is needed for pharmacist consultation.  • Tobacco Users Help Hotline: 8-398-QUIT-NOW (156-7903) or https://[place].quitlogix.org/  o Free, confidential telephone and online coaching for [place] residents. Sessions are designed on a schedule that is convenient for you. Eligible clients receive free nicotine replacement therapy.  • Nationally: www.smokefree.gov  o Information and professional assistance to support both immediate and long-term needs as you become, and remain, a non-smoker. Smokefree.gov allows you to choose the help that best fits your needs.

## 2017-10-12 ENCOUNTER — IMMUNIZATION (OUTPATIENT)
Dept: OCCUPATIONAL MEDICINE | Facility: CLINIC | Age: 22
End: 2017-10-12

## 2017-10-12 DIAGNOSIS — Z23 NEED FOR VACCINATION: ICD-10-CM

## 2017-10-12 PROCEDURE — 90686 IIV4 VACC NO PRSV 0.5 ML IM: CPT | Performed by: PREVENTIVE MEDICINE

## 2018-03-19 ENCOUNTER — OFFICE VISIT (OUTPATIENT)
Dept: MEDICAL GROUP | Facility: PHYSICIAN GROUP | Age: 23
End: 2018-03-19
Payer: COMMERCIAL

## 2018-03-19 VITALS
TEMPERATURE: 98.6 F | HEIGHT: 60 IN | HEART RATE: 82 BPM | OXYGEN SATURATION: 99 % | DIASTOLIC BLOOD PRESSURE: 70 MMHG | WEIGHT: 106 LBS | SYSTOLIC BLOOD PRESSURE: 112 MMHG | RESPIRATION RATE: 16 BRPM | BODY MASS INDEX: 20.81 KG/M2

## 2018-03-19 DIAGNOSIS — E55.9 VITAMIN D DEFICIENCY: ICD-10-CM

## 2018-03-19 DIAGNOSIS — Z00.00 WELL WOMAN EXAM WITHOUT GYNECOLOGICAL EXAM: ICD-10-CM

## 2018-03-19 DIAGNOSIS — R25.2 BILATERAL LEG CRAMPS: ICD-10-CM

## 2018-03-19 PROBLEM — M72.2 PLANTAR FASCIITIS OF RIGHT FOOT: Status: RESOLVED | Noted: 2017-06-21 | Resolved: 2018-03-19

## 2018-03-19 PROCEDURE — 99395 PREV VISIT EST AGE 18-39: CPT | Performed by: FAMILY MEDICINE

## 2018-03-19 ASSESSMENT — PATIENT HEALTH QUESTIONNAIRE - PHQ9: CLINICAL INTERPRETATION OF PHQ2 SCORE: 0

## 2018-03-19 ASSESSMENT — PAIN SCALES - GENERAL: PAINLEVEL: NO PAIN

## 2018-03-19 NOTE — PROGRESS NOTES
Subjective:     CC:   Chief Complaint   Patient presents with   • Annual Exam     PE   • Orders Needed     vit d       HPI:   Lucy Al is a 22 y.o. female who presents for annual exam. She is feeling well and denies any complaints.    Patient has GYN provider: no  Last pap: 3/2017, normal  Last mammo: N/A  Last colonoscopy: N/A  Last bone density test: N/A  Last Tdap: 10/2008, UTD  Gardiasil: Patient refuesed  Hx. STD's: No, patient declines screening  Birth control: Condoms, patient is not interested in hormonal birth control    Menses every month with 28 days spotting, light bleeding.  Cramping is none.   She does not take OTC analgesics for cramps.  No significant bloating/fluid retention, pelvic pain, or dyspareunia. No vaginal discharge.  No breast tenderness, mass, nipple discharge, changes in size or contour, or abnormal cyclic discomfort.  She does not perform regular self breast exams.  Regular exercise: Yes   Diet: Healthy    She  has a past medical history of Depression and Pericarditis.  She  has no past surgical history on file.    Family History   Problem Relation Age of Onset   • Alcohol/Drug Father      alcoholism   • Psychiatry Father      bipolar disorder       Social History     Social History   • Marital status: Single     Spouse name: N/A   • Number of children: N/A   • Years of education: N/A     Occupational History   • Not on file.     Social History Main Topics   • Smoking status: Light Tobacco Smoker   • Smokeless tobacco: Never Used      Comment: exposed to 1 smoker   • Alcohol use No   • Drug use: Yes     Types: Marijuana   • Sexual activity: Yes     Partners: Male     Birth control/ protection: Condom     Other Topics Concern   • Not on file     Social History Narrative   • No narrative on file       Patient Active Problem List    Diagnosis Date Noted   • Vitamin D deficiency 01/13/2017   • History of substance abuse 11/28/2016   • Depression 11/11/2010         No current  outpatient prescriptions on file.     No current facility-administered medications for this visit.      No Known Allergies    Review of Systems   Constitutional: Negative for fever, chills and malaise/fatigue.   HENT: Negative for congestion.    Eyes: Negative for pain.   Respiratory: Negative for cough and shortness of breath.    Cardiovascular: Negative for leg swelling.   Gastrointestinal: Negative for nausea, vomiting, abdominal pain and diarrhea.   Genitourinary: Negative for dysuria and hematuria.   Skin: Negative for rash.   Neurological: Negative for dizziness, focal weakness and headaches.   Musculoskeletal: Positive for bilateral lower extremity cramps.  Endo/Heme/Allergies: Does not bruise/bleed easily.   Psychiatric/Behavioral: Negative for depression.  The patient is not nervous/anxious.      Objective:     /70   Pulse 82   Temp 37 °C (98.6 °F)   Resp 16   Ht 1.524 m (5')   Wt 48.1 kg (106 lb)   LMP 03/06/2018   SpO2 99%   Breastfeeding? No   BMI 20.70 kg/m²   Body mass index is 20.7 kg/m².  Wt Readings from Last 4 Encounters:   03/19/18 48.1 kg (106 lb)   07/05/17 46.3 kg (102 lb)   06/21/17 47 kg (103 lb 9.9 oz)   03/17/17 50.7 kg (111 lb 12.4 oz)     Physical Exam:  Constitutional: Well-developed and well-nourished. Not diaphoretic. No distress.   Skin: Skin is warm and dry. No rash noted.  Head: Atraumatic without lesions.  Eyes: Conjunctivae and extraocular motions are normal. Pupils are equal, round, and reactive to light. No scleral icterus.   Ears:  External ears unremarkable. Tympanic membranes clear and intact.  Nose: Nares patent. Septum midline. Turbinates without erythema nor edema. No discharge.   Mouth/Throat: Dentition is good. Tongue normal. Oropharynx is clear and moist. Posterior pharynx without erythema or exudates.  Neck: Supple, trachea midline. Normal range of motion. No thyromegaly present. No lymphadenopathy--cervical or supraclavicular.  Cardiovascular: Regular  rate and rhythm, S1 and S2 without murmur, rubs, or gallops.    Breast: Deferred.  Abdomen: Soft, non tender, and without distention. Active bowel sounds in all four quadrants. No rebound, guarding, masses or HSM.  : Deferred.  Extremities: No cyanosis, clubbing, erythema, nor edema. Distal pulses intact and symmetric.   Musculoskeletal: All major joints AROM full in all directions without pain.  Neurological: Alert and oriented x 3. DTRs 2+/3 and symmetric. No cranial nerve deficit. 5/5 myotomes. Sensation intact. Negative Rhomberg.  Psychiatric:  Behavior, mood, and affect are appropriate.    Assessment and Plan:     1. Well woman exam without gynecological exam  This is a healthy 22-year-old female here today for a preventative exam. Previous medical history, healthcare maintenance and immunization status reviewed. Patient is up to date. Labwork ordered as discussed below. See discussion of anticipatory guidance below. Patient will return annually for preventative exams.   2. Vitamin D deficiency  Stable. On OTC supplement. Recheck level.  VITAMIN D,25 HYDROXY   3. Bilateral leg cramps  This is a new problem. We'll check lab work to look for electrolyte deficiency. Continue to monitor.  BASIC METABOLIC PANEL    MAGNESIUM    VITAMIN D,25 HYDROXY     HCM:  UTD.   Labs per orders.  Immunizations offered and patient declined.  Patient counseled about skin care, diet, supplements, prenatal vitamins, safe sex and exercise.    Follow-up: Return in about 1 year (around 3/19/2019) for Annual, Short.

## 2018-07-24 ENCOUNTER — HOSPITAL ENCOUNTER (OUTPATIENT)
Dept: LAB | Facility: MEDICAL CENTER | Age: 23
End: 2018-07-24
Attending: FAMILY MEDICINE
Payer: COMMERCIAL

## 2018-07-24 DIAGNOSIS — R25.2 BILATERAL LEG CRAMPS: ICD-10-CM

## 2018-07-24 DIAGNOSIS — E55.9 VITAMIN D DEFICIENCY: ICD-10-CM

## 2018-07-24 PROCEDURE — 36415 COLL VENOUS BLD VENIPUNCTURE: CPT

## 2018-07-24 PROCEDURE — 82306 VITAMIN D 25 HYDROXY: CPT

## 2018-07-24 PROCEDURE — 83735 ASSAY OF MAGNESIUM: CPT

## 2018-07-24 PROCEDURE — 80048 BASIC METABOLIC PNL TOTAL CA: CPT

## 2018-07-25 ENCOUNTER — APPOINTMENT (OUTPATIENT)
Dept: MEDICAL GROUP | Facility: PHYSICIAN GROUP | Age: 23
End: 2018-07-25
Payer: COMMERCIAL

## 2018-07-25 LAB
25(OH)D3 SERPL-MCNC: 25 NG/ML (ref 30–100)
ANION GAP SERPL CALC-SCNC: 8 MMOL/L (ref 0–11.9)
BUN SERPL-MCNC: 10 MG/DL (ref 8–22)
CALCIUM SERPL-MCNC: 9.8 MG/DL (ref 8.5–10.5)
CHLORIDE SERPL-SCNC: 105 MMOL/L (ref 96–112)
CO2 SERPL-SCNC: 26 MMOL/L (ref 20–33)
CREAT SERPL-MCNC: 0.8 MG/DL (ref 0.5–1.4)
GLUCOSE SERPL-MCNC: 124 MG/DL (ref 65–99)
MAGNESIUM SERPL-MCNC: 1.9 MG/DL (ref 1.5–2.5)
POTASSIUM SERPL-SCNC: 4.5 MMOL/L (ref 3.6–5.5)
SODIUM SERPL-SCNC: 139 MMOL/L (ref 135–145)

## 2018-08-22 ENCOUNTER — OFFICE VISIT (OUTPATIENT)
Dept: MEDICAL GROUP | Facility: PHYSICIAN GROUP | Age: 23
End: 2018-08-22
Payer: COMMERCIAL

## 2018-08-22 VITALS
BODY MASS INDEX: 21.2 KG/M2 | TEMPERATURE: 98.6 F | HEIGHT: 60 IN | WEIGHT: 108 LBS | OXYGEN SATURATION: 96 % | DIASTOLIC BLOOD PRESSURE: 64 MMHG | SYSTOLIC BLOOD PRESSURE: 102 MMHG | HEART RATE: 86 BPM | RESPIRATION RATE: 16 BRPM

## 2018-08-22 DIAGNOSIS — F33.2 SEVERE EPISODE OF RECURRENT MAJOR DEPRESSIVE DISORDER, WITHOUT PSYCHOTIC FEATURES (HCC): ICD-10-CM

## 2018-08-22 DIAGNOSIS — F19.11 HISTORY OF SUBSTANCE ABUSE (HCC): ICD-10-CM

## 2018-08-22 PROCEDURE — 99214 OFFICE O/P EST MOD 30 MIN: CPT | Performed by: FAMILY MEDICINE

## 2018-08-22 RX ORDER — SERTRALINE HYDROCHLORIDE 25 MG/1
25 TABLET, FILM COATED ORAL DAILY
Qty: 30 TAB | Refills: 0 | Status: SHIPPED | OUTPATIENT
Start: 2018-08-22 | End: 2018-09-05 | Stop reason: SDUPTHER

## 2018-08-22 ASSESSMENT — PATIENT HEALTH QUESTIONNAIRE - PHQ9
9. THOUGHTS THAT YOU WOULD BE BETTER OFF DEAD, OR OF HURTING YOURSELF: 3
4. FEELING TIRED OR HAVING LITTLE ENERGY: 2
SUM OF ALL RESPONSES TO PHQ QUESTIONS 1-9: 22
8. MOVING OR SPEAKING SO SLOWLY THAT OTHER PEOPLE COULD HAVE NOTICED. OR THE OPPOSITE, BEING SO FIGETY OR RESTLESS THAT YOU HAVE BEEN MOVING AROUND A LOT MORE THAN USUAL: 2
3. TROUBLE FALLING OR STAYING ASLEEP OR SLEEPING TOO MUCH: 3
2. FEELING DOWN, DEPRESSED, IRRITABLE, OR HOPELESS: 3
5. POOR APPETITE OR OVEREATING: 3
7. TROUBLE CONCENTRATING ON THINGS, SUCH AS READING THE NEWSPAPER OR WATCHING TELEVISION: 2
1. LITTLE INTEREST OR PLEASURE IN DOING THINGS: 1
SUM OF ALL RESPONSES TO PHQ9 QUESTIONS 1 AND 2: 4
6. FEELING BAD ABOUT YOURSELF - OR THAT YOU ARE A FAILURE OR HAVE LET YOURSELF OR YOUR FAMILY DOWN: 3

## 2018-08-22 ASSESSMENT — PAIN SCALES - GENERAL: PAINLEVEL: NO PAIN

## 2018-08-22 NOTE — PROGRESS NOTES
"Subjective:   Lucy Al is a 22 y.o. female here today for depression.    Patient appears visibly upset today.  At first, she told me that she \"didn't want to talk,\" but opened up later into our visit.  Since her last appointment with me in March, she has been feeling very depressed.  She denies any specific triggers, but has been having a difficult time getting along with her older half-sister.  She has also been drinking more alcohol than she usually does and has had a couple of episodes of blacking out.  Last Friday, she had too much to drink and had an argument with her boyfriend and best friend.  She tells me, \"it is not like me to drink so much.\"  She denies use of other substances.  I asked her specifically about any emotional or physical abuse and she denied this.  She has been having passive thoughts of suicide, but denies any current thoughts.  No current plans or intent.  No homicidal thoughts.  She denies hearing voices or feeling as though someone is watching her.    We reviewed her PHQ 9 screen which show that she is feeling down, having poor appetite and difficulty sleeping.    Patient Health Questionaire  Little interest or pleasure in doing things?: 1   Feeling down, depressed, or hopeless?: 3  Trouble falling or staying asleep, or sleeping too much? : 3  Feeling tired or having little energy? : 2  Poor appetite or overeating? : 3  Feeling bad about yourself - or that you are a failure or have let yourself or your family down? : 3  Trouble concentrating on things, such as reading the newspaper or watching television? : 2  Moving or speaking so slowly that other people could have noticed.  Or the opposite - being so fidgety or restless that you have been moving around alot more than usual. : 2  Thoughts that you would be better off dead, or of hurting yourself?: 3  Patient Health Questionnaire Score: 22    Depression Screen (PHQ-2/PHQ-9) 11/28/2016 3/19/2018 8/22/2018   PHQ-2 Total Score " - - 4   PHQ-2 Total Score 1 0 -   PHQ-9 Total Score - - 22     She has been diagnosed with depression in the past, but never started medication.  She also has a history of substance abuse.  Depression and substance abuse also run in her family.    Current medicines (including changes today)  Current Outpatient Prescriptions   Medication Sig Dispense Refill   • vitamin D (CHOLECALCIFEROL) 1000 UNIT Tab Take 1,000 Units by mouth every day.     • sertraline (ZOLOFT) 25 MG tablet Take 1 Tab by mouth every day. 30 Tab 0     No current facility-administered medications for this visit.      She  has a past medical history of Depression and Pericarditis.    ROS   No headaches, no chest pain, no shortness of breath, no abdominal pain.     Objective:     Physical Exam:  Blood pressure 102/64, pulse 86, temperature 37 °C (98.6 °F), resp. rate 16, height 1.524 m (5'), weight 49 kg (108 lb), SpO2 96 %, not currently breastfeeding. Body mass index is 21.09 kg/m².   Constitutional: Alert, non-toxic appearance. Well-groomed, but wearing a hoodie over her head.  Skin: Warm, dry, good turgor, no rashes in visible areas.  Eye: Equal, round and reactive, conjunctiva clear, lids normal.  ENMT: Lips without lesions, good dentition, oropharynx clear.  Neck: Trachea midline, no masses, no thyromegaly.  Respiratory: Unlabored respiratory effort, no cough.  Psych: Alert and oriented x3, flat affect and mood, withdrawn, avoiding eye contact, crying intermittently.    Assessment and Plan:     1. Severe episode of recurrent major depressive disorder, without psychotic features (HCC)  2. History of substance abuse  Worsening.  Patient is having a severe episode of depression.  Denies any current suicidal or homicidal ideation.  She has a history of depression and substance abuse.  Denies psychosis.  Provided her with the number for the [employer] [facility].  Anti-depressant medication prescribed and urgent referral to counseling  placed.  Patient has signed a no-suicide contract.  Discussed that should the patient have any symptoms they should call suicide prevention hotline or report to the emergency room immediately.  She will have close follow-up with me in two weeks.  - sertraline (ZOLOFT) 25 MG tablet; Take 1 Tab by mouth every day.  Dispense: 30 Tab; Refill: 0  - REFERRAL TO BEHAVIORAL HEALTH    Followup: Return in about 2 weeks (around 9/5/2018) for f/u depression, short.         PLEASE NOTE: This dictation was created using voice recognition software. I have made every reasonable attempt to correct obvious errors, but I expect that there are errors of grammar and possibly content that I did not discover before finalizing the note.

## 2018-09-05 ENCOUNTER — OFFICE VISIT (OUTPATIENT)
Dept: MEDICAL GROUP | Facility: PHYSICIAN GROUP | Age: 23
End: 2018-09-05
Payer: COMMERCIAL

## 2018-09-05 VITALS
WEIGHT: 108.91 LBS | SYSTOLIC BLOOD PRESSURE: 110 MMHG | OXYGEN SATURATION: 97 % | HEART RATE: 82 BPM | BODY MASS INDEX: 21.38 KG/M2 | RESPIRATION RATE: 16 BRPM | HEIGHT: 60 IN | DIASTOLIC BLOOD PRESSURE: 60 MMHG | TEMPERATURE: 97.7 F

## 2018-09-05 DIAGNOSIS — F19.11 HISTORY OF SUBSTANCE ABUSE (HCC): ICD-10-CM

## 2018-09-05 DIAGNOSIS — Z23 NEED FOR VACCINATION: ICD-10-CM

## 2018-09-05 DIAGNOSIS — F33.2 SEVERE EPISODE OF RECURRENT MAJOR DEPRESSIVE DISORDER, WITHOUT PSYCHOTIC FEATURES (HCC): ICD-10-CM

## 2018-09-05 PROCEDURE — 90471 IMMUNIZATION ADMIN: CPT | Performed by: FAMILY MEDICINE

## 2018-09-05 PROCEDURE — 90686 IIV4 VACC NO PRSV 0.5 ML IM: CPT | Performed by: FAMILY MEDICINE

## 2018-09-05 PROCEDURE — 99214 OFFICE O/P EST MOD 30 MIN: CPT | Mod: 25 | Performed by: FAMILY MEDICINE

## 2018-09-05 RX ORDER — SERTRALINE HYDROCHLORIDE 25 MG/1
25 TABLET, FILM COATED ORAL DAILY
Qty: 90 TAB | Refills: 1 | Status: SHIPPED | OUTPATIENT
Start: 2018-09-05 | End: 2018-10-22

## 2018-09-05 NOTE — PROGRESS NOTES
"Subjective:   Lucy Al is a 22 y.o. female here today for follow-up depression.    Since her last appointment approximately 2 weeks ago, Lucy tells me that her mood has \"improved.\"  She has labs episodes of crying.  She does still feel \"worthless.\"  Denies any suicidal or homicidal thoughts.  She has been taking sertraline 25 mg without any adverse effects.  The medication does make her tired at times and she wonders if she can take it at night instead of first thing in the morning.  She has an appointment later today to establish with a therapist.    Current medicines (including changes today)  Current Outpatient Prescriptions   Medication Sig Dispense Refill   • sertraline (ZOLOFT) 25 MG tablet Take 1 Tab by mouth every day. 90 Tab 1   • vitamin D (CHOLECALCIFEROL) 1000 UNIT Tab Take 1,000 Units by mouth every day.       No current facility-administered medications for this visit.      She  has a past medical history of Depression and Pericarditis.    ROS   No chest pain, no shortness of breath, no abdominal pain.     Objective:     Physical Exam:  Blood pressure 110/60, pulse 82, temperature 36.5 °C (97.7 °F), resp. rate 16, height 1.524 m (5'), weight 49.4 kg (108 lb 14.5 oz), SpO2 97 %. Body mass index is 21.27 kg/m².   Constitutional: Alert, no distress.  Skin: Warm, dry, good turgor, no rashes in visible areas.  Eye: Equal, round and reactive, conjunctiva clear, lids normal.  ENMT: Lips without lesions, good dentition, moist mucous membranes.  Neck: Trachea midline, no masses, no thyromegaly.  Respiratory: Unlabored respiratory effort, no cough.  Psych: Alert and oriented x3, normal affect and mood.    Assessment and Plan:     1. Severe episode of recurrent major depressive disorder, without psychotic features (HCC)  2. History of substance abuse  Improved since last appointment, but it is still early.  She denies crisis today and clinically appears better.  Emphasized the need to take " anti-depressant daily and follow-up with therapist. Discussed that should the patient have any symptoms they should call suicide prevention hotline or report to the emergency room immediately.  - sertraline (ZOLOFT) 25 MG tablet; Take 1 Tab by mouth every day.  Dispense: 90 Tab; Refill: 1    3. Need for vaccination  Influenza vaccine discussed and administered in office today.  - INFLUENZA VACCINE QUAD INJ >3Y(PF)    Followup: Return in about 3 months (around 12/5/2018) for f/u depression and medication, short.         PLEASE NOTE: This dictation was created using voice recognition software. I have made every reasonable attempt to correct obvious errors, but I expect that there are errors of grammar and possibly content that I did not discover before finalizing the note.

## 2018-09-10 ENCOUNTER — DOCUMENTATION (OUTPATIENT)
Dept: OCCUPATIONAL MEDICINE | Facility: CLINIC | Age: 23
End: 2018-09-10

## 2018-09-26 ENCOUNTER — EH NON-PROVIDER (OUTPATIENT)
Dept: OCCUPATIONAL MEDICINE | Facility: CLINIC | Age: 23
End: 2018-09-26

## 2018-09-26 DIAGNOSIS — Z02.89 ENCOUNTER FOR OCCUPATIONAL HEALTH EXAMINATION INVOLVING RESPIRATOR: ICD-10-CM

## 2018-09-26 PROCEDURE — 94010 BREATHING CAPACITY TEST: CPT | Performed by: PREVENTIVE MEDICINE

## 2018-09-26 PROCEDURE — 94375 RESPIRATORY FLOW VOLUME LOOP: CPT | Performed by: PREVENTIVE MEDICINE

## 2018-10-05 ENCOUNTER — DOCUMENTATION (OUTPATIENT)
Dept: OCCUPATIONAL MEDICINE | Facility: CLINIC | Age: 23
End: 2018-10-05

## 2018-10-05 NOTE — PROGRESS NOTES
Seen for spirometry on 09/26/18.    Spirometry test are within normal limits for patient's stated age, height, and weight at the time of test. Patient is physically able to perform essential functions based on job title or work conditions defined at the time of evaluation without accommodations as it pertains to their respiratory status.     See scanned documents.

## 2018-10-22 ENCOUNTER — OFFICE VISIT (OUTPATIENT)
Dept: MEDICAL GROUP | Facility: PHYSICIAN GROUP | Age: 23
End: 2018-10-22
Payer: COMMERCIAL

## 2018-10-22 VITALS
SYSTOLIC BLOOD PRESSURE: 100 MMHG | OXYGEN SATURATION: 96 % | BODY MASS INDEX: 21.6 KG/M2 | WEIGHT: 110 LBS | DIASTOLIC BLOOD PRESSURE: 62 MMHG | TEMPERATURE: 98.6 F | HEART RATE: 83 BPM | HEIGHT: 60 IN | RESPIRATION RATE: 18 BRPM

## 2018-10-22 DIAGNOSIS — F33.41 RECURRENT MAJOR DEPRESSIVE DISORDER, IN PARTIAL REMISSION (HCC): ICD-10-CM

## 2018-10-22 DIAGNOSIS — H00.014 HORDEOLUM EXTERNUM OF LEFT UPPER EYELID: ICD-10-CM

## 2018-10-22 DIAGNOSIS — Z23 NEED FOR VACCINATION: ICD-10-CM

## 2018-10-22 PROCEDURE — 90651 9VHPV VACCINE 2/3 DOSE IM: CPT | Performed by: FAMILY MEDICINE

## 2018-10-22 PROCEDURE — 99214 OFFICE O/P EST MOD 30 MIN: CPT | Mod: 25 | Performed by: FAMILY MEDICINE

## 2018-10-22 PROCEDURE — 90471 IMMUNIZATION ADMIN: CPT | Performed by: FAMILY MEDICINE

## 2018-10-22 NOTE — PROGRESS NOTES
"Subjective:   Lucy Al is a 23 y.o. female here today for follow-up depression and possible stye.  She is unaccompanied for today's appointment.    Recurrent major depressive disorder (HCC)  Since her last appointment, patient reports that her depression symptoms have significantly improved.  She tells me, \"I no longer feel sad.\"  She did have an episode where she felt down and isolated herself a few weeks ago.  Thinks it was due to thinking about her ex-boyfriend.  She later increased her sertraline to two tablets and tells me this helped.  She would like to increase her dose.    Denies SI/HI.  We reviewed her PHQ-9 today which is low at 1.    Hordeolum externum of left upper eyelid  This is a new problem.  Patient states that she noticed a \"bump\" on her left upper eyelid a couple of months ago.  Her mother told her to use an antibiotic eye drop.  She had one left over and gave it to her.  The \"bump\" is decreasing in size.  She denies tenderness or drainage.  No vision changes.  No redness around her eyelids.     Current medicines (including changes today)  Current Outpatient Prescriptions   Medication Sig Dispense Refill   • sertraline (ZOLOFT) 50 MG Tab Take 1 Tab by mouth every day. 90 Tab 1   • vitamin D (CHOLECALCIFEROL) 1000 UNIT Tab Take 1,000 Units by mouth every day.       No current facility-administered medications for this visit.      She  has a past medical history of Depression and Pericarditis.    ROS   No chest pain, no shortness of breath, no abdominal pain.     Objective:     Physical Exam:  Blood pressure 100/62, pulse 83, temperature 37 °C (98.6 °F), temperature source Temporal, resp. rate 18, height 1.524 m (5'), weight 49.9 kg (110 lb), SpO2 96 %, not currently breastfeeding. Body mass index is 21.48 kg/m².   Constitutional: Alert, no distress, non-toxic appearance.  Skin: Warm, dry, good turgor, no rashes in visible areas.  Eye: Equal, round and reactive, conjunctiva clear, " +small subcutaneous nodule on left upper eyelid without erythema or expressible drainage.  ENMT: Lips without lesions, good dentition, moist mucous membranes.  Neck: Trachea midline, no masses, no thyromegaly.   Respiratory: Unlabored respiratory effort, no cough.  Abdomen: Soft, no gross masses.  Psych: Alert and oriented x3, normal affect and mood.    Assessment and Plan:     1. Recurrent major depressive disorder, in partial remission (HCC)  Patient is feeling well on current medication at higher dosage.  Will increase sertraline to 50mg daily.  Denies any suicidal or homicidal ideation.  Emphasized importance of healthy diet and exercise.  Discussed that should the patient have any symptoms they should call suicide prevention hotline or report to the emergency room immediately.  - sertraline (ZOLOFT) 50 MG Tab; Take 1 Tab by mouth every day.  Dispense: 90 Tab; Refill: 1    2. Hordeolum externum of left upper eyelid  Improving.  Continue warm compresses and monitor.    3. Need for vaccination  HPV vaccine discussed and administered in office today.  - 9VHPV Vaccine 2-3 Dose IM    Followup: Return in about 6 months (around 4/22/2019) for f/u sertraline, short.         PLEASE NOTE: This dictation was created using voice recognition software. I have made every reasonable attempt to correct obvious errors, but I expect that there are errors of grammar and possibly content that I did not discover before finalizing the note.

## 2018-10-22 NOTE — ASSESSMENT & PLAN NOTE
"Since her last appointment, patient reports that her depression symptoms have significantly improved.  She tells me, \"I no longer feel sad.\"  She did have an episode where she felt down and isolated herself a few weeks ago.  Thinks it was due to thinking about her ex-boyfriend.  She later increased her sertraline to two tablets and tells me this helped.  She would like to increase her dose.    Denies SI/HI.  We reviewed her PHQ-9 today which is low at 1.      "

## 2018-10-22 NOTE — ASSESSMENT & PLAN NOTE
"This is a new problem.  Patient states that she noticed a \"bump\" on her left upper eyelid a couple of months ago.  Her mother told her to use an antibiotic eye drop.  She had one left over and gave it to her.  The \"bump\" is decreasing in size.  She denies tenderness or drainage.  No vision changes.  No redness around her eyelids.  "

## 2019-01-02 DIAGNOSIS — Z23 IMMUNIZATION DUE: ICD-10-CM

## 2019-01-18 DIAGNOSIS — Z23 IMMUNIZATION DUE: ICD-10-CM

## 2019-01-21 ENCOUNTER — APPOINTMENT (OUTPATIENT)
Dept: MEDICAL GROUP | Facility: PHYSICIAN GROUP | Age: 24
End: 2019-01-21
Payer: COMMERCIAL

## 2019-02-12 ENCOUNTER — NON-PROVIDER VISIT (OUTPATIENT)
Dept: MEDICAL GROUP | Facility: PHYSICIAN GROUP | Age: 24
End: 2019-02-12
Payer: COMMERCIAL

## 2019-02-12 DIAGNOSIS — Z23 NEED FOR VACCINATION: ICD-10-CM

## 2019-02-12 PROCEDURE — 90471 IMMUNIZATION ADMIN: CPT | Performed by: FAMILY MEDICINE

## 2019-02-12 PROCEDURE — 90651 9VHPV VACCINE 2/3 DOSE IM: CPT | Performed by: FAMILY MEDICINE

## 2019-02-12 NOTE — PROGRESS NOTES
"Lucy Al is a 23 y.o. female here for a non-provider visit for:   HPV 2 of 3    Reason for immunization: continue or complete series started at the office  Immunization records indicate need for vaccine: Yes, confirmed with Epic  Minimum interval has been met for this vaccine: Yes  ABN completed: Not Indicated    Order and dose verified by: Joi KAY Dated  12/2/16 was given to patient: No  All IAC Questionnaire questions were answered \"No.\"    Patient tolerated injection and no adverse effects were observed or reported: Yes    Pt scheduled for next dose in series: Yes    "

## 2019-04-24 ENCOUNTER — OFFICE VISIT (OUTPATIENT)
Dept: MEDICAL GROUP | Facility: PHYSICIAN GROUP | Age: 24
End: 2019-04-24
Payer: COMMERCIAL

## 2019-04-24 ENCOUNTER — APPOINTMENT (OUTPATIENT)
Dept: MEDICAL GROUP | Facility: PHYSICIAN GROUP | Age: 24
End: 2019-04-24
Payer: COMMERCIAL

## 2019-04-24 VITALS
BODY MASS INDEX: 22.5 KG/M2 | DIASTOLIC BLOOD PRESSURE: 62 MMHG | OXYGEN SATURATION: 96 % | HEART RATE: 80 BPM | TEMPERATURE: 99.6 F | RESPIRATION RATE: 12 BRPM | SYSTOLIC BLOOD PRESSURE: 96 MMHG | WEIGHT: 114.6 LBS | HEIGHT: 60 IN

## 2019-04-24 DIAGNOSIS — F33.1 MODERATE EPISODE OF RECURRENT MAJOR DEPRESSIVE DISORDER (HCC): ICD-10-CM

## 2019-04-24 PROCEDURE — 99213 OFFICE O/P EST LOW 20 MIN: CPT | Performed by: FAMILY MEDICINE

## 2019-04-24 NOTE — PROGRESS NOTES
Subjective:   Lucy Al is a 23 y.o. female here today for depression follow up. She is unaccompanied for today's visit.     Recurrent major depressive disorder, in partial remission (HCC)  This is a chronic and stable problem for Lucy. The patient reports that since she has started taking Zoloft she has been experiencing vivid dreams. These dreams do not appear to be bothersome for her. She feels as if the medication has been helping with her anxiety, although she has recently been very affected by events involving her friends. She would not elaborate further, but it is upsetting to her. She denies worsening depression. Not interested in therapy at this time. Denies suicidal or homicidal thoughts.    She also has questions about marijuana use. She tells me that she has been either smoking or consuming marijuana since she was 14. She asks about a medical marijuana card.    Current medicines (including changes today)  Current Outpatient Prescriptions   Medication Sig Dispense Refill   • sertraline (ZOLOFT) 50 MG Tab Take 1 Tab by mouth every day. 90 Tab 1   • vitamin D (CHOLECALCIFEROL) 1000 UNIT Tab Take 1,000 Units by mouth every day.       No current facility-administered medications for this visit.      She  has a past medical history of Depression and Pericarditis.    ROS   No chest pain, no shortness of breath, no abdominal pain.     Objective:     Physical Exam:  BP (!) 96/62   Pulse 80   Temp 37.6 °C (99.6 °F)   Resp 12   Ht 1.524 m (5')   Wt 52 kg (114 lb 9.6 oz)   SpO2 96%  Body mass index is 22.38 kg/m².   Constitutional: Alert, no distress, well-groomed.  Skin: Warm, dry, good turgor, no rashes in visible areas.  Eye: Equal, round and reactive, conjunctiva clear, lids normal.  ENMT: Lips without lesions, good dentition, moist mucous membranes.  Neck: Trachea midline, no masses, no thyromegaly.  Respiratory: Unlabored respiratory effort, no cough.  Abdomen: Soft, no gross masses.  MSK:  Normal gait, moves all extremities.  Neuro: Grossly non-focal. No cranial nerve deficit. Strength and sensation intact.   Psych: Alert and oriented x3, depressed mood and affect, teary eyed.    Assessment and Plan:     1. Moderate episode of recurrent major depressive disorder (HCC)  Patient is feeling well on current medications.  Will continue.  Denies any suicidal or homicidal ideation.  Emphasized importance of healthy diet and exercise.  Discussed that should the patient have any symptoms they should call suicide prevention hotline or report to the emergency room immediately.  - sertraline (ZOLOFT) 50 MG Tab; Take 1 Tab by mouth every day.  Dispense: 90 Tab; Refill: 1    Followup: Return in about 3 weeks (around 5/15/2019) for check-in, shots, short.          Semaj CAMPOS (Scribe), am scribing for, and in the presence of, Zoraida Chavez MD    Electronically signed by: Semaj Hawthorne (Scribe), 4/24/2019    Zoraida CAMPOS MD personally performed the services described in this documentation, as scribed by Semaj Hawthorne in my presence, and it is both accurate and complete.

## 2019-06-05 DIAGNOSIS — Z23 IMMUNIZATION DUE: ICD-10-CM

## 2019-06-14 ENCOUNTER — OFFICE VISIT (OUTPATIENT)
Dept: MEDICAL GROUP | Facility: PHYSICIAN GROUP | Age: 24
End: 2019-06-14
Payer: COMMERCIAL

## 2019-06-14 VITALS
HEIGHT: 60 IN | SYSTOLIC BLOOD PRESSURE: 100 MMHG | WEIGHT: 109 LBS | OXYGEN SATURATION: 96 % | TEMPERATURE: 98.6 F | RESPIRATION RATE: 16 BRPM | BODY MASS INDEX: 21.4 KG/M2 | DIASTOLIC BLOOD PRESSURE: 72 MMHG | HEART RATE: 62 BPM

## 2019-06-14 DIAGNOSIS — Z23 NEED FOR VACCINATION: ICD-10-CM

## 2019-06-14 DIAGNOSIS — F12.90 MARIJUANA USE: ICD-10-CM

## 2019-06-14 DIAGNOSIS — F33.1 MODERATE EPISODE OF RECURRENT MAJOR DEPRESSIVE DISORDER (HCC): ICD-10-CM

## 2019-06-14 DIAGNOSIS — Z62.820 RELATIONSHIP PROBLEM BETWEEN PARENT AND CHILD: ICD-10-CM

## 2019-06-14 DIAGNOSIS — F19.10 SUBSTANCE ABUSE (HCC): ICD-10-CM

## 2019-06-14 PROCEDURE — 90471 IMMUNIZATION ADMIN: CPT | Performed by: FAMILY MEDICINE

## 2019-06-14 PROCEDURE — 90472 IMMUNIZATION ADMIN EACH ADD: CPT | Performed by: FAMILY MEDICINE

## 2019-06-14 PROCEDURE — 90715 TDAP VACCINE 7 YRS/> IM: CPT | Performed by: FAMILY MEDICINE

## 2019-06-14 PROCEDURE — 99214 OFFICE O/P EST MOD 30 MIN: CPT | Mod: 25 | Performed by: FAMILY MEDICINE

## 2019-06-14 PROCEDURE — 90651 9VHPV VACCINE 2/3 DOSE IM: CPT | Performed by: FAMILY MEDICINE

## 2019-06-14 RX ORDER — SERTRALINE HYDROCHLORIDE 100 MG/1
100 TABLET, FILM COATED ORAL DAILY
Qty: 30 TAB | Refills: 2 | Status: SHIPPED | OUTPATIENT
Start: 2019-06-14 | End: 2019-07-19 | Stop reason: SDUPTHER

## 2019-06-14 NOTE — PROGRESS NOTES
"Subjective:   Lucy Al is a 23 y.o. female here today for depression follow up. She is accompanied by her mother, Kristina, for the beginning of her visit today.    Moderate episode of recurrent major depressive disorder (HCC)  Patient has a chronic history of recurrent major depressive order and is currently prescribed Zoloft 50 mg once daily. She has continued to take the medication, but states that recently she has been taking two pills daily. We did discuss increasing her dose at her last visit in April, but she declined at that time. I advised her that I would send over the higher dose, but she is concerned about the size of the pill. Questions answered. Denies any SI or HI. Denies auditory or visual hallucinations.    Of note, patient was scheduled for close follow up in mid-May although she did not come in for her visit.     Substance abuse (HCC)  Relationship problem between parent and child  Lucy presents with her mother today, who has become very concerned with Lucy's recent \"poor decisions and drug use\". Her mother states that she has been \"making bad decisions\" which have been causing her family large amounts of distress. Recently, her mother has noticed that Lucy is very sad and appears tired during the day. She believes that this is related to drug use. Patient's mother states that she Lucy her take an at-home drug test which was positive for opiates. Lucy and her mother are in agreement that Lucy needs help and possible guidance from a program which can help her make better choices in her life. She does smoke marijuana almost daily. Her mother states that she remains supportive of Lucy and would never force her to move out of her home, but she is growing tired of her behavior and needs Lucy to \"follow her rules\" if she is to continue living in her home. Additionally, Lucy has questions about disability or FMLA paperwork in order to allow her to focus on her health.     Need for " vaccination  We have also discussed Lucy's vaccine status. She would like to receive her second Gardasil vaccination at this time. She continues to smoke cigarettes, but has refused her pneumonia vaccination. She is due for her Tdap and meningitis vaccination, but would like to receive her meningitis vaccination at a later date.      Current medicines (including changes today)  Current Outpatient Prescriptions   Medication Sig Dispense Refill   • sertraline (ZOLOFT) 100 MG Tab Take 1 Tab by mouth every day. 30 Tab 2   • vitamin D (CHOLECALCIFEROL) 1000 UNIT Tab Take 1,000 Units by mouth every day.       No current facility-administered medications for this visit.      She  has a past medical history of Depression and Pericarditis.    ROS   No chest pain, no shortness of breath, no abdominal pain, no SI, no HI.      Objective:     Physical Exam:  /72   Pulse 62   Temp 37 °C (98.6 °F)   Resp 16   Ht 1.524 m (5')   Wt 49.4 kg (109 lb)   SpO2 96%  Body mass index is 21.29 kg/m².   Constitutional: Alert, no distress, well-groomed.  Skin: Warm, dry, good turgor, no rashes in visible areas.  Eye: Equal, round and reactive, conjunctiva clear, lids normal.  ENMT: Lips without lesions, good dentition, moist mucous membranes.  Neck: Trachea midline, no masses, no thyromegaly.  Respiratory: Unlabored respiratory effort, no cough.  Abdomen: Soft, no gross masses.  MSK: Normal gait, moves all extremities.  Neuro: Grossly non-focal. No cranial nerve deficit. Strength and sensation intact.   Psych: Alert and oriented x3, poor eye contact, slightly agitated when discussing vaccines, flat affect. Answers questions appropriately. No evidence of psychotic process.     Assessment and Plan:     1. Moderate episode of recurrent major depressive disorder (HCC)  Worsening. She denies SI or HI at this time. Query if there may also be an underlying mood or personality disorder. No evidence of psychotic process and patient seems  to have good insight into her situation. Increase sertraline to 100mg as Lucy is already taking this. Urgent referral placed to behavioral health. I've also provided a list of local substance abuse resources. Emphasized importance of healthy diet and exercise.  Discussed that should the patient have any symptoms they should call suicide prevention hotline or report to the emergency room immediately. Close follow-up in two weeks.  - REFERRAL TO BEHAVIORAL HEALTH  - sertraline (ZOLOFT) 100 MG Tab; Take 1 Tab by mouth every day.  Dispense: 30 Tab; Refill: 2    2. Substance abuse (HCC)  3. Marijuana use  4. Relationship problem between parent and child  Worsening. Concern for opioid use disorder. Urged patient to stop any pain medication and marijuana use. I am worried she is not ready to make these necessary changes yet. Per patient's mother, she has been using opiate drugs and is causing her family significant distress. Patient and her mother are in agreement that the patient needs further help. Referral to behavioral health placed. Additional resources for substance abuse discussed.  - REFERRAL TO BEHAVIORAL HEALTH    5. Need for vaccination  Tdap and HPV administered today. Patient's vaccine status discussed. She has declined meningitis and pneumonia at this time. I have expressed my concerns for the necessity of the pneumonia vaccination given patient's current smoking status.  - 9VHPV Vaccine 2-3 Dose IM  - Tdap =>6yo IM    Followup: Return in about 2 weeks (around 6/28/2019) for f/u depression, short.          Semaj CAMPOS ([place]), am scribing for, and in the presence of, Zoraida Chavez MD    Electronically signed by: Semaj Hawthorne ([place]), 6/14/2019    Zoraida CAMPOS MD personally performed the services described in this documentation, as scribed by Semaj Hawthorne in my presence, and it is both accurate and complete.

## 2019-07-01 ENCOUNTER — OFFICE VISIT (OUTPATIENT)
Dept: BEHAVIORAL HEALTH | Facility: CLINIC | Age: 24
End: 2019-07-01
Payer: COMMERCIAL

## 2019-07-01 VITALS
HEIGHT: 59 IN | SYSTOLIC BLOOD PRESSURE: 100 MMHG | HEART RATE: 70 BPM | WEIGHT: 113 LBS | DIASTOLIC BLOOD PRESSURE: 50 MMHG | BODY MASS INDEX: 22.78 KG/M2

## 2019-07-01 DIAGNOSIS — F41.9 ANXIETY DISORDER, UNSPECIFIED TYPE: ICD-10-CM

## 2019-07-01 DIAGNOSIS — F11.90 OPIATE MISUSE: ICD-10-CM

## 2019-07-01 DIAGNOSIS — F33.1 MDD (MAJOR DEPRESSIVE DISORDER), RECURRENT EPISODE, MODERATE (HCC): ICD-10-CM

## 2019-07-01 PROCEDURE — 90833 PSYTX W PT W E/M 30 MIN: CPT | Performed by: PSYCHIATRY & NEUROLOGY

## 2019-07-01 PROCEDURE — 99204 OFFICE O/P NEW MOD 45 MIN: CPT | Performed by: PSYCHIATRY & NEUROLOGY

## 2019-07-01 ASSESSMENT — PATIENT HEALTH QUESTIONNAIRE - PHQ9
CLINICAL INTERPRETATION OF PHQ2 SCORE: 3
SUM OF ALL RESPONSES TO PHQ QUESTIONS 1-9: 12
5. POOR APPETITE OR OVEREATING: 0 - NOT AT ALL
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
## 2019-07-03 ENCOUNTER — APPOINTMENT (OUTPATIENT)
Dept: MEDICAL GROUP | Facility: PHYSICIAN GROUP | Age: 24
End: 2019-07-03
Payer: COMMERCIAL

## 2019-07-09 ENCOUNTER — HOSPITAL ENCOUNTER (OUTPATIENT)
Dept: BEHAVIORAL HEALTH | Facility: MEDICAL CENTER | Age: 24
End: 2019-07-09
Attending: PSYCHIATRY & NEUROLOGY
Payer: COMMERCIAL

## 2019-07-09 DIAGNOSIS — F33.1 MDD (MAJOR DEPRESSIVE DISORDER), RECURRENT EPISODE, MODERATE (HCC): ICD-10-CM

## 2019-07-09 DIAGNOSIS — F11.90 OPIATE MISUSE: ICD-10-CM

## 2019-07-09 PROCEDURE — 90791 PSYCH DIAGNOSTIC EVALUATION: CPT
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
## 2019-07-09 NOTE — PROGRESS NOTES
I have reviewed the note by Reshma Redding RN and agree with the assessment and treatment plan.    1. Admit to Intensive Outpatient Program on 7/11/19    - Group therapy per schedule,    - Psychoeducational groups per schedule,   - Individual/family counseling sessions with  per treatment plan.  2. Symptoms necessitating Intensive Outpatient Treatment: depression, anxiety, heroin use  3. Medical screening/Physical exam per primary care provider or referring facility.    Kira Onofre MD

## 2019-07-10 ENCOUNTER — HOSPITAL ENCOUNTER (OUTPATIENT)
Facility: MEDICAL CENTER | Age: 24
End: 2019-07-10
Attending: FAMILY MEDICINE
Payer: COMMERCIAL

## 2019-07-10 ENCOUNTER — OFFICE VISIT (OUTPATIENT)
Dept: MEDICAL GROUP | Facility: PHYSICIAN GROUP | Age: 24
End: 2019-07-10
Payer: COMMERCIAL

## 2019-07-10 VITALS
SYSTOLIC BLOOD PRESSURE: 102 MMHG | DIASTOLIC BLOOD PRESSURE: 64 MMHG | BODY MASS INDEX: 21.57 KG/M2 | RESPIRATION RATE: 14 BRPM | OXYGEN SATURATION: 95 % | HEIGHT: 59 IN | TEMPERATURE: 98.6 F | HEART RATE: 96 BPM | WEIGHT: 107 LBS

## 2019-07-10 DIAGNOSIS — F33.1 MDD (MAJOR DEPRESSIVE DISORDER), RECURRENT EPISODE, MODERATE (HCC): ICD-10-CM

## 2019-07-10 DIAGNOSIS — Z11.3 SCREEN FOR STD (SEXUALLY TRANSMITTED DISEASE): ICD-10-CM

## 2019-07-10 DIAGNOSIS — F19.11 HISTORY OF SUBSTANCE ABUSE (HCC): ICD-10-CM

## 2019-07-10 DIAGNOSIS — Z00.00 ENCOUNTER FOR ROUTINE HISTORY AND PHYSICAL EXAM IN FEMALE: ICD-10-CM

## 2019-07-10 DIAGNOSIS — F11.11 HISTORY OF HEROIN ABUSE (HCC): ICD-10-CM

## 2019-07-10 DIAGNOSIS — F41.9 ANXIETY DISORDER, UNSPECIFIED TYPE: ICD-10-CM

## 2019-07-10 PROBLEM — F11.90 OPIATE MISUSE: Status: RESOLVED | Noted: 2019-07-01 | Resolved: 2019-07-10

## 2019-07-10 LAB
C TRACH DNA SPEC QL NAA+PROBE: NEGATIVE
N GONORRHOEA DNA SPEC QL NAA+PROBE: NEGATIVE
SPECIMEN SOURCE: NORMAL

## 2019-07-10 PROCEDURE — 87491 CHLMYD TRACH DNA AMP PROBE: CPT

## 2019-07-10 PROCEDURE — 87591 N.GONORRHOEAE DNA AMP PROB: CPT

## 2019-07-10 PROCEDURE — 99395 PREV VISIT EST AGE 18-39: CPT | Performed by: FAMILY MEDICINE

## 2019-07-10 RX ORDER — HYDROXYZINE HYDROCHLORIDE 25 MG/1
25 TABLET, FILM COATED ORAL 3 TIMES DAILY PRN
Qty: 30 TAB | Refills: 0 | Status: SHIPPED | OUTPATIENT
Start: 2019-07-10 | End: 2020-02-03

## 2019-07-11 ENCOUNTER — HOSPITAL ENCOUNTER (OUTPATIENT)
Dept: BEHAVIORAL HEALTH | Facility: MEDICAL CENTER | Age: 24
End: 2019-07-11
Attending: PSYCHIATRY & NEUROLOGY
Payer: COMMERCIAL

## 2019-07-11 DIAGNOSIS — F11.99 OPIOID USE WITH OPIOID-INDUCED DISORDER (HCC): ICD-10-CM

## 2019-07-11 PROCEDURE — 90853 GROUP PSYCHOTHERAPY: CPT | Performed by: MARRIAGE & FAMILY THERAPIST

## 2019-07-11 NOTE — BH THERAPY
Group Therapy Checklist  Attendance: Attended  Attendance Duration (min):  (60)  Number of Participants: 11  Program/Group: Intensive Outpatient Program  Topics Covered: Stress Management  Participation: Active verbal participation, Attentive  Affect/Mood Range: Constricted  Affect/Mood Display: Congruent w/content, Sad  Cognition: Oriented, Alert  Evidence of Imminent Suicide Risk: No  Evidence of imminent homicide risk: No  Therapeutic Interventions: Psychoeducation re: (Comment), Cognitive clarification  Progress Toward Treatment Goal: Moderate improvement

## 2019-07-11 NOTE — BH THERAPY
Group Therapy Checklist  Attendance: Attended  Attendance Duration (min):  (90)  Number of Participants: 10  Program/Group: Intensive Outpatient Program  Topics Covered:  (Process Group )  Participation: Active verbal participation, Attentive, Supportive to other group members. This was Lucy's first day. She shared that she wished she had not falling in love with the man she did and that she had not become addicted to heroin.   Affect/Mood Range: Normal range  Affect/Mood Display: Congruent w/content, Tearful  Cognition: Alert, Oriented  Evidence of Imminent Suicide Risk: No  Evidence of imminent homicide risk: No  Therapeutic Interventions: Emotion clarification, Cognitive clarification, Supportive psychotherapy  Progress Toward Treatment Goal: No Change

## 2019-07-12 ENCOUNTER — HOSPITAL ENCOUNTER (OUTPATIENT)
Dept: BEHAVIORAL HEALTH | Facility: MEDICAL CENTER | Age: 24
End: 2019-07-12
Attending: PSYCHIATRY & NEUROLOGY
Payer: COMMERCIAL

## 2019-07-12 VITALS
DIASTOLIC BLOOD PRESSURE: 76 MMHG | WEIGHT: 111 LBS | HEART RATE: 81 BPM | BODY MASS INDEX: 22.42 KG/M2 | SYSTOLIC BLOOD PRESSURE: 124 MMHG

## 2019-07-12 DIAGNOSIS — F11.20 OPIOID USE DISORDER, MODERATE, DEPENDENCE (HCC): ICD-10-CM

## 2019-07-12 DIAGNOSIS — F41.9 ANXIETY DISORDER, UNSPECIFIED TYPE: ICD-10-CM

## 2019-07-12 DIAGNOSIS — F11.99 OPIOID USE WITH OPIOID-INDUCED DISORDER (HCC): ICD-10-CM

## 2019-07-12 DIAGNOSIS — F33.1 MDD (MAJOR DEPRESSIVE DISORDER), RECURRENT EPISODE, MODERATE (HCC): ICD-10-CM

## 2019-07-12 PROCEDURE — 99214 OFFICE O/P EST MOD 30 MIN: CPT | Performed by: PSYCHIATRY & NEUROLOGY

## 2019-07-12 PROCEDURE — 90853 GROUP PSYCHOTHERAPY: CPT

## 2019-07-12 NOTE — LETTER
2019      Reg: Lucy Al ( 1995)      To whom it may concern,      This is to inform you that Lucy Al is a patient of mine and is currently in Intensive Outpatient Program. She is currently struggling and I support her time off from work from 19 to 19 as she is working on improving her health.      Please feel free to contact me if there any questions.      Sincerely,        Kira Onofre M.D.

## 2019-07-12 NOTE — BH THERAPY
Group Therapy Checklist  Attendance: Attended  Attendance Duration (min):  (60)  Number of Participants: 8  Program/Group: Intensive Outpatient Program  Topics Covered: Med aspects [place]  Participation: Attentive  Affect/Mood Range: Normal range, Flexible  Affect/Mood Display: Congruent w/content  Cognition: Alert, Oriented  Evidence of Imminent Suicide Risk: No  Evidence of imminent homicide risk: No  Therapeutic Interventions: Cognitive clarification, Relapse prevention  Progress Toward Treatment Goal: Mild improvement
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
## 2019-07-12 NOTE — PROGRESS NOTES
PSYCHIATRY FOLLOW-UP NOTE      Chief Complaint   Patient presents with   • Other     letter for work   • Follow-Up     depression, anxiety         History Of Present Illness:  Lucy Al is a 23 y.o. old female with depressive disorder, anxiety disorder comes in today for follow up, was last seen for an initial evaluation 10 days ago.  She started the intensive outpatient program yesterday.  She feels that she is slowly but steadily getting better.  Her father moved in with her mother and this has been good for her as she does have good support from him.  She finally came clean and told her parents about her opioid and heroin use and feels happy about telling the truth.  She is trying to gain the trust of her parents back.  She currently does not have access to her car, credit card or her phone which have been taken away by her mother.  She feels that she still loves her ex-boyfriend even though she is upset with what he did to her and her family recently.  She last used heroin last week and and struggled with withdrawals for a day but is now feeling better.  She does not qualify for FMLA but is taking some personal time off and wants a letter for the same.  She is motivated to be sober and improve her coping skills.  She denies having thoughts of wanting to hurt herself or others.  She has been compliant with Zoloft and was recently started on hydroxyzine for anxiety and sleep and she has been using mainly for sleep and it has been helpful.    Social History:   She is currently single, has never been  and has no kids.  She lives in [place] with her mother.  Her parents got  when she was 12 years old.  She has an older half brother who lives in [place] and her older half sister who lives in [place].  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    Substance Use:  Alcohol - Denies recent use  Nicotine -  "Smokes 3-4 cigarettes daily  Illicit drugs - See HPI for details    Past Medication Trials:  Prozac at age 16 (\"never gave it a chance\"), Wellbutrin at age 16 (\"never gave it a chance\")    Medications:  Current Outpatient Prescriptions   Medication Sig Dispense Refill   • hydrOXYzine HCl (ATARAX) 25 MG Tab Take 1 Tab by mouth 3 times a day as needed for Anxiety (or difficulty sleeping). 30 Tab 0   • sertraline (ZOLOFT) 100 MG Tab Take 1 Tab by mouth every day. 30 Tab 2   • vitamin D (CHOLECALCIFEROL) 1000 UNIT Tab Take 1,000 Units by mouth every day.       No current facility-administered medications for this encounter.        Review Of Systems:    Constitutional - Positive for fatigue  Respiratory - Negative for shortness of breath, cough  CVS - Negative for chest pain, palpitations  GI - Negative for nausea, vomiting, abdominal pain, diarrhea, constipation  Musculoskeletal - Negative for back pain  Neurological - Negative for headaches  Psychiatric - Positive for depression, anxiety    Physical Examination:  Vital signs: /76   Pulse 81   Wt 50.3 kg (111 lb)   BMI 22.42 kg/m²     Musculoskeletal: Normal gait. No abnormal movements.     Mental Status Evaluation:   General: Young  female, dressed in casual attire, good grooming and hygiene, in no apparent distress, calm and cooperative, good eye contact, no psychomotor agitation or retardation  Orientation: Alert and oriented to person, place and time  Recent and remote memory: Grossly intact  Attention span and concentration: Grossly intact  Speech: Spontaneous, normal rate, rhythm and tone  Thought Process: Linear, logical and goal directed  Thought Content: Denies suicidal or homicidal ideations, intent or plan  Perception: Denies auditory or visual hallucinations. No delusions noted  Associations: Intact  Language: Appropriate  Fund of knowledge and vocabulary: Grossly adequate  Mood: \"okay\"  Affect: Dysphoric at times, mood congruent  Insight: " Good  Judgment: Good    Depression screening:  Depression Screen (PHQ-2/PHQ-9) 3/19/2018 8/22/2018 7/1/2019   PHQ-2 Total Score - 4 -   PHQ-2 Total Score 0 - 3   PHQ-9 Total Score - 22 -   PHQ-9 Total Score - - 12       Interpretation of PHQ-9 Total Score   Score Severity   1-4 No Depression   5-9 Mild Depression   10-14 Moderate Depression   15-19 Moderately Severe Depression   20-27 Severe Depression    Medical Records/Labs/Diagnostic Tests Reviewed:  [place]  records - no prescribed controlled medications found in the last 1 year      Impression:  1.  Major depressive disorder, recurrent, moderate  2.  Anxiety disorder, unspecified type  3.  Opioid use disorder, moderate (last used heroin 7/6/19, last used Oxycodone 2/2019)    Plan:  1.  Continue intensive outpatient program  2.  Continue Zoloft 100 mg daily for depression and anxiety  3.  Continue Hydroxyzine 25 mg 3 times daily for anxiety and sleep.  She has been using it mainly for sleep and have advised her to use that if her anxiety gets overwhelming during daytime.  She has been having some cravings for using cannabis to calm down her anxiety and was advised to use Hydroxyzine instead.  4.  Continue to avoid opiates and other illicit drugs  5.  Provided her with a letter for her workplace    Return to clinic on as needed basis or sooner if symptoms worsen    The proposed treatment plan was discussed with the patient who was provided the opportunity to ask questions and make suggestions regarding alternative treatment. Patient verbalized understanding and expressed agreement with the plan.     Kira Onofre M.D.  07/12/19    This note was created using voice recognition software (Dragon). The accuracy of the dictation is limited by the abilities of the software. I have reviewed the note prior to signing, however some errors in grammar and context are still possible. If you have any questions related to this note please do not hesitate to contact our office.

## 2019-07-15 ENCOUNTER — HOSPITAL ENCOUNTER (OUTPATIENT)
Dept: BEHAVIORAL HEALTH | Facility: MEDICAL CENTER | Age: 24
End: 2019-07-15
Attending: PSYCHIATRY & NEUROLOGY
Payer: COMMERCIAL

## 2019-07-15 DIAGNOSIS — F33.1 MAJOR DEPRESSIVE DISORDER, RECURRENT EPISODE, MODERATE (HCC): ICD-10-CM

## 2019-07-15 DIAGNOSIS — F11.20 OPIOID USE DISORDER, MODERATE, DEPENDENCE (HCC): ICD-10-CM

## 2019-07-15 DIAGNOSIS — F11.99 OPIOID USE WITH OPIOID-INDUCED DISORDER (HCC): ICD-10-CM

## 2019-07-15 PROCEDURE — 90853 GROUP PSYCHOTHERAPY: CPT | Performed by: MARRIAGE & FAMILY THERAPIST

## 2019-07-15 NOTE — BH THERAPY
Group Therapy Checklist  Attendance: Attended  Attendance Duration (min):  (60)  Number of Participants: 7  Program/Group: Intensive Outpatient Program  Topics Covered: Recovery Planning  Participation: Limited verbal participation, Attentive  Affect/Mood Range: Labile  Affect/Mood Display: Congruent w/content, Sad, Tearful  Cognition: Oriented, Alert  Evidence of Imminent Suicide Risk: No  Evidence of imminent homicide risk: No  Therapeutic Interventions: Psychoeducation re: (Comment), Cognitive clarification  Progress Toward Treatment Goal: Moderate improvement

## 2019-07-15 NOTE — BH THERAPY
Group Therapy Checklist  Attendance: Attended  Attendance Duration (min):  (90)  Number of Participants: 7  Program/Group: Intensive Outpatient Program  Topics Covered:  (Process Group )  Participation: Active verbal participation, Attentive, defensive, guarded. Lucy shared how she was angry at her father.   Affect/Mood Range: Normal range  Affect/Mood Display: Congruent w/content  Cognition: Alert, Oriented  Evidence of Imminent Suicide Risk: No  Evidence of imminent homicide risk: No  Therapeutic Interventions: Emotion clarification, Cognitive clarification, Supportive psychotherapy  Progress Toward Treatment Goal: Moderate improvement

## 2019-07-16 ENCOUNTER — HOSPITAL ENCOUNTER (OUTPATIENT)
Dept: BEHAVIORAL HEALTH | Facility: MEDICAL CENTER | Age: 24
End: 2019-07-16
Attending: PSYCHIATRY & NEUROLOGY
Payer: COMMERCIAL

## 2019-07-16 DIAGNOSIS — F11.20 OPIOID USE DISORDER, MODERATE, DEPENDENCE (HCC): ICD-10-CM

## 2019-07-16 PROCEDURE — 90853 GROUP PSYCHOTHERAPY: CPT

## 2019-07-16 NOTE — BH THERAPY
Group Therapy Checklist  Attendance: Attended  Attendance Duration (min):  (90)  Number of Participants: 9  Program/Group: Intensive Outpatient Program  Topics Covered:  (Group Therapy)  Participation: Active verbal participation, Attentive (Pt processed experience growing up with her dad in a [address] and his heavey metal music and drugs and how she smoked pot with him.  Mother took her when she found out. )  Affect/Mood Range: Flexible  Affect/Mood Display: Congruent w/content  Cognition: Oriented, Alert  Evidence of Imminent Suicide Risk: No  Evidence of imminent homicide risk: No  Therapeutic Interventions: Cognitive clarification, Emotion clarification  Progress Toward Treatment Goal: Moderate improvement

## 2019-07-16 NOTE — BH THERAPY
Group Therapy Checklist  Attendance: Attended  Attendance Duration (min):  (60)  Number of Participants: 9  Program/Group: Intensive Outpatient Program  Topics Covered: Journal writing  Participation: Active verbal participation, Attentive, Supportive to other group members, Open to feedback  Affect/Mood Range: Normal range, Flexible  Affect/Mood Display: Congruent w/content  Cognition: Alert, Oriented  Evidence of Imminent Suicide Risk: No  Evidence of imminent homicide risk: No  Therapeutic Interventions: Cognitive clarification, Behavioral activation  Progress Toward Treatment Goal: Moderate improvement

## 2019-07-17 ENCOUNTER — HOSPITAL ENCOUNTER (OUTPATIENT)
Dept: BEHAVIORAL HEALTH | Facility: MEDICAL CENTER | Age: 24
End: 2019-07-17
Attending: PSYCHIATRY & NEUROLOGY
Payer: COMMERCIAL

## 2019-07-17 DIAGNOSIS — F11.20 OPIOID USE DISORDER, MODERATE, DEPENDENCE (HCC): ICD-10-CM

## 2019-07-17 PROCEDURE — 90853 GROUP PSYCHOTHERAPY: CPT

## 2019-07-17 NOTE — BH THERAPY
"Group Therapy Checklist  Attendance: Attended  Attendance Duration (min):  (60)  Number of Participants: 10  Program/Group: Intensive Outpatient Program  Topics Covered: \"Pieces of Silence\"  Participation: Attentive  Affect/Mood Range: Normal range, Flexible  Affect/Mood Display: Congruent w/content  Cognition: Alert, Oriented  Evidence of Imminent Suicide Risk: No  Evidence of imminent homicide risk: No  Therapeutic Interventions: Cognitive clarification, Relapse prevention  Progress Toward Treatment Goal: Moderate improvement    "

## 2019-07-17 NOTE — BH THERAPY
Group Therapy Checklist  Attendance: Attended  Attendance Duration (min):  (90)  Number of Participants: 10  Program/Group: Intensive Outpatient Program  Topics Covered:  (Group Therapy)  Participation: No verbal participation, Attentive (Pt seemed to be doing better today after processing about her parents yesterday.   She frequently looked to this therapist for eye contact. )  Affect/Mood Range: Flexible  Affect/Mood Display: Congruent w/content  Cognition: Oriented, Alert  Evidence of Imminent Suicide Risk: No  Evidence of imminent homicide risk: No  Therapeutic Interventions: Cognitive clarification, Emotion clarification  Progress Toward Treatment Goal: Moderate improvement

## 2019-07-18 ENCOUNTER — HOSPITAL ENCOUNTER (OUTPATIENT)
Dept: BEHAVIORAL HEALTH | Facility: MEDICAL CENTER | Age: 24
End: 2019-07-18
Attending: PSYCHIATRY & NEUROLOGY
Payer: COMMERCIAL

## 2019-07-19 ENCOUNTER — APPOINTMENT (OUTPATIENT)
Dept: BEHAVIORAL HEALTH | Facility: MEDICAL CENTER | Age: 24
End: 2019-07-19
Attending: PSYCHIATRY & NEUROLOGY
Payer: COMMERCIAL

## 2019-07-19 DIAGNOSIS — F33.1 MODERATE EPISODE OF RECURRENT MAJOR DEPRESSIVE DISORDER (HCC): ICD-10-CM

## 2019-07-19 RX ORDER — SERTRALINE HYDROCHLORIDE 100 MG/1
TABLET, FILM COATED ORAL
Qty: 30 TAB | Refills: 2 | Status: SHIPPED | OUTPATIENT
Start: 2019-07-19 | End: 2019-10-22 | Stop reason: SDUPTHER

## 2019-07-22 ENCOUNTER — HOSPITAL ENCOUNTER (OUTPATIENT)
Dept: BEHAVIORAL HEALTH | Facility: MEDICAL CENTER | Age: 24
End: 2019-07-22
Attending: PSYCHIATRY & NEUROLOGY
Payer: COMMERCIAL

## 2019-07-22 ENCOUNTER — TELEPHONE (OUTPATIENT)
Dept: BEHAVIORAL HEALTH | Facility: MEDICAL CENTER | Age: 24
End: 2019-07-22

## 2019-07-22 DIAGNOSIS — F11.99 OPIOID USE WITH OPIOID-INDUCED DISORDER (HCC): ICD-10-CM

## 2019-07-22 DIAGNOSIS — F33.1 MAJOR DEPRESSIVE DISORDER, RECURRENT EPISODE, MODERATE (HCC): ICD-10-CM

## 2019-07-22 PROCEDURE — 90853 GROUP PSYCHOTHERAPY: CPT | Performed by: MARRIAGE & FAMILY THERAPIST

## 2019-07-22 NOTE — BH THERAPY
Group Therapy Checklist  Attendance: Attended  Attendance Duration (min):  (90)  Number of Participants: 6  Program/Group: Intensive Outpatient Program  Topics Covered:  (Process Group )  Participation: Active verbal participation, Attentive, Guarded/resistant. Lucy has have a difficult time thinking of a wish today and turned the conversation to losing a friend. Now she feels like she does not have a support system at all minus her parents.   Affect/Mood Range: Constricted  Affect/Mood Display: Congruent w/content, Anxious  Cognition: Alert, Oriented  Evidence of Imminent Suicide Risk: No  Evidence of imminent homicide risk: No  Therapeutic Interventions: Emotion clarification, Cognitive clarification, Supportive psychotherapy  Progress Toward Treatment Goal: No change

## 2019-07-22 NOTE — BH THERAPY
Group Therapy Checklist  Attendance: Attended  Attendance Duration (min):  (60)  Number of Participants: 6  Program/Group: Intensive Outpatient Program  Topics Covered: Recovery Planning  Participation: Active verbal participation  Affect/Mood Range: Flexible  Affect/Mood Display: Congruent w/content, Labile  Cognition: Oriented, Alert  Therapeutic Interventions: Psychoeducation re: (Comment), Cognitive clarification  Progress Toward Treatment Goal: Moderate improvement

## 2019-07-22 NOTE — TELEPHONE ENCOUNTER
I agree with the treatment plan.     Kira Onofre MD  
[facility]    Treatment Team Staffing    Patient Name: Lucy Al Program: IOP Date: 7/22/2019     Attendees: Stefania Soto R.N., CAMILO Patel, Paris Hawthorne, Letty Marie, CPC-I, Mayank Recinos, PhD, and Kira Onofre M.D.    Patient's Progress toward Goals Listed on the Treatment Plan: Lucy states that she has maintained her sobriety while in program.     1. Client's Participation When in Attendance Was: Passive - Little If Any Contribution    2. Counselor's Evaluation of Client's Progress: No Change in This Reported Period    3. Patient is attending group and individual sessions and is progressing well toward the treatment goals: no She is attending group; however, she did not make our individual session last week.       YES NO   A. Relapse During Program []  [x]    B. Requires physician review []  [x]    C. Referral to program inappropriate []  [x]    D. Non compliance with Treatment Plan []  [x]    E. Early treatment termination (lack of attendance) []  [x]    F. Other: []  []      Comments: Treatment plan has not been created yet due to not having met with Lucy yet. We have an appointment for tomorrow. She was also sent for a UDS that she needs to complete today or tomorrow per Dr. Onofre's request.     Treatment Plan Review: - Continue Individual therapy, Group therapy, Medication management, Intensive Outpatient Program and 12-Step participation  - Next appointment scheduled:  7/23/2019  - Patient is in agreement with the above plan:  YES  
room air

## 2019-07-23 ENCOUNTER — HOSPITAL ENCOUNTER (OUTPATIENT)
Dept: BEHAVIORAL HEALTH | Facility: MEDICAL CENTER | Age: 24
End: 2019-07-23
Attending: PSYCHIATRY & NEUROLOGY
Payer: COMMERCIAL

## 2019-07-23 ENCOUNTER — HOSPITAL ENCOUNTER (OUTPATIENT)
Dept: LAB | Facility: MEDICAL CENTER | Age: 24
End: 2019-07-23
Attending: PSYCHIATRY & NEUROLOGY
Payer: COMMERCIAL

## 2019-07-23 DIAGNOSIS — F11.20 OPIOID USE DISORDER, MODERATE, DEPENDENCE (HCC): ICD-10-CM

## 2019-07-23 PROCEDURE — 80307 DRUG TEST PRSMV CHEM ANLYZR: CPT

## 2019-07-23 PROCEDURE — 90834 PSYTX W PT 45 MINUTES: CPT

## 2019-07-23 PROCEDURE — 90853 GROUP PSYCHOTHERAPY: CPT

## 2019-07-23 NOTE — CARE PLAN
"Problem: Mood Instability Interfering with Adl’S  Goal: Abstinence    Intervention: Individual Counseling Sessions   [facility]  Therapy Progress Note    Patient Name: Lucy Al  Patient MRN: [id number]  Today's Date: 7/23/2019     Type of session:Individual psychotherapy  Length of session: 45 minutes  Persons in attendance:Patient    Subjective/New Info: Lucy's goal for program is to remain sober. She stakes that she is 17 days sober from heroin but is having a difficult time not smoking marijuana. She states that she \"needs it to calm down and sleep.\" The expectation that this is a abstinence based program was addresses. Her motivation for this program is that she is doing it for her mom and to complete it so that she can say that she completed it. She is living at home due to her drug use and debt and this is a huge stress for her because although she loves her mom very much, she is having a difficult time with her mom's tendency to control her due to fear that she will use drugs. Her dad is recovering from drug and alcohol use and is living there too. She feels that he does not get to tell her what to do because of his history and what he was like when she was growing up. Another place of contention in the family is that she dances at [employer] and this is very displeasing to them but she does not want to give it up. When asked what she does for fun she says dancing. Her support system is her mom and her new boyfriend of a couple of months, Cristobal (a 41 year old male), who is also in a recovery program newly sober. She states that she has gotten rid of all of her old friends because they don't really care about her.     Objective/Observations:   Participation: Active verbal participation, Guarded, Defensive and Resistant   Grooming: Casual and Neat   Cognition: Alert and Fully Oriented   Eye contact: Limited   Mood: Angry   Affect: Congruent with content, Anxious, Tearful and " "Angry   Thought process: Logical and Goal-directed   Speech: Rate within normal limits and Volume within normal limits   Other:     Diagnoses: F11.20    Current risk:   SUICIDE: Low   Homicide: Low   Self-harm: Low   Relapse: High   Other:    Safety Plan reviewed? No   If evidence of imminent risk is present, intervention/plan:     Therapeutic Intervention(s): Establish rapport, Goal-setting, Positive behavior reinforced, Supportive psychotherapy and Treatment compliance addressed    Treatment Goal(s)/Objective(s) addressed: Reasons for sobriety were discussed.      Progress toward Treatment Goals: No change    Plan:  - Continue Individual therapy, Group therapy, Medication management, Intensive Outpatient Program and 12-Step participation  - \"Homework\" recommendation: Step 1  - Next appointment scheduled:  7/30/2019  - Patient is in agreement with the above plan:  YES    Letty Marie CPC-I  7/23/2019                                       "

## 2019-07-23 NOTE — BH THERAPY
Group Therapy Checklist  Attendance: Attended  Attendance Duration (min):  (60)  Number of Participants: 6  Program/Group: Intensive Outpatient Program  Topics Covered: Grief & Loss  Participation: Limited verbal participation, Attentive, Guarded, Resistant   Affect/Mood Display: Congruent w/content  Cognition: Alert, Oriented  Evidence of Imminent Suicide Risk: No  Evidence of imminent homicide risk: No  Therapeutic Interventions: Psychoeducation re: Grief and loss  Progress Toward Treatment Goal: Mild improvement

## 2019-07-24 ENCOUNTER — HOSPITAL ENCOUNTER (OUTPATIENT)
Dept: BEHAVIORAL HEALTH | Facility: MEDICAL CENTER | Age: 24
End: 2019-07-24
Attending: PSYCHIATRY & NEUROLOGY
Payer: COMMERCIAL

## 2019-07-24 DIAGNOSIS — F11.20 OPIOID USE DISORDER, MODERATE, DEPENDENCE (HCC): ICD-10-CM

## 2019-07-24 PROCEDURE — 90853 GROUP PSYCHOTHERAPY: CPT

## 2019-07-24 NOTE — BH THERAPY
Group Therapy Checklist  Attendance: Attended  Attendance Duration (min):  (90)  Number of Participants: 8  Program/Group: Intensive Outpatient Program  Topics Covered:  (Group Therapy)  Participation: Active verbal participation, Attentive, Supportive to other group members (Pt processed her highest hopes and deepest fears and wants to get independent of her parents as they control her too much right now. )  Affect/Mood Range: Flexible  Affect/Mood Display: Congruent w/content  Evidence of Imminent Suicide Risk: No  Evidence of imminent homicide risk: No  Therapeutic Interventions: Cognitive clarification, Emotion clarification  Progress Toward Treatment Goal: Moderate improvement

## 2019-07-24 NOTE — BH THERAPY
Group Therapy Checklist  Attendance: Attended  Attendance Duration (min):  (60)  Number of Participants: 8  Program/Group: Intensive Outpatient Program  Topics Covered: Cognitive distortions  Participation: Active verbal participation  Affect/Mood Range: Normal range, Flexible  Affect/Mood Display: Congruent w/content  Cognition: Alert, Oriented  Evidence of Imminent Suicide Risk: No  Evidence of imminent homicide risk: No  Therapeutic Interventions: Cognitive clarification, Behavioral activation  Progress Toward Treatment Goal: Moderate improvement

## 2019-07-25 LAB
AMPHET CTO UR CFM-MCNC: NEGATIVE NG/ML
BARBITURATES CTO UR CFM-MCNC: NEGATIVE NG/ML
BENZODIAZ CTO UR CFM-MCNC: NEGATIVE NG/ML
CANNABINOIDS CTO UR CFM-MCNC: POSITIVE NG/ML
COCAINE CTO UR CFM-MCNC: NEGATIVE NG/ML
DRUG COMMENT 753798: NORMAL
METHADONE CTO UR CFM-MCNC: NEGATIVE NG/ML
OPIATES CTO UR CFM-MCNC: NEGATIVE NG/ML
PCP CTO UR CFM-MCNC: NEGATIVE NG/ML
PROPOXYPH CTO UR CFM-MCNC: NEGATIVE NG/ML

## 2019-07-27 LAB — THC UR CFM-MCNC: 234 NG/ML

## 2019-07-29 ENCOUNTER — HOSPITAL ENCOUNTER (OUTPATIENT)
Dept: BEHAVIORAL HEALTH | Facility: MEDICAL CENTER | Age: 24
End: 2019-07-29
Attending: PSYCHIATRY & NEUROLOGY
Payer: COMMERCIAL

## 2019-07-29 DIAGNOSIS — F11.99 OPIOID USE WITH OPIOID-INDUCED DISORDER (HCC): ICD-10-CM

## 2019-07-29 PROCEDURE — 90853 GROUP PSYCHOTHERAPY: CPT | Performed by: MARRIAGE & FAMILY THERAPIST

## 2019-07-30 ENCOUNTER — HOSPITAL ENCOUNTER (OUTPATIENT)
Dept: BEHAVIORAL HEALTH | Facility: MEDICAL CENTER | Age: 24
End: 2019-07-30
Attending: PSYCHIATRY & NEUROLOGY
Payer: COMMERCIAL

## 2019-07-30 DIAGNOSIS — F11.20 OPIOID USE DISORDER, MODERATE, DEPENDENCE (HCC): ICD-10-CM

## 2019-07-30 PROCEDURE — 90834 PSYTX W PT 45 MINUTES: CPT

## 2019-07-30 PROCEDURE — 90853 GROUP PSYCHOTHERAPY: CPT

## 2019-07-30 NOTE — BH THERAPY
Group Therapy Checklist  Attendance: Attended  Attendance Duration (min):  (90)  Number of Participants: 10  Program/Group: Intensive Outpatient Program  Topics Covered:  (Group Therapy)  Participation: Limited verbal participation, Attentive, Supportive to other group members (Pt di dnot process any emotions but did offer support to peers in group . )  Affect/Mood Range: Flexible  Affect/Mood Display: Congruent w/content  Cognition: Oriented, Alert  Evidence of Imminent Suicide Risk: No  Evidence of imminent homicide risk: No  Therapeutic Interventions: Cognitive clarification, Emotion clarification  Progress Toward Treatment Goal: Moderate improvement

## 2019-07-30 NOTE — BH THERAPY
Group Therapy Checklist  Attendance: Attended  Attendance Duration (min):  (60)  Number of Participants: 10  Program/Group: Intensive Outpatient Program  Topics Covered: Detachment  Participation: Active verbal participation  Affect/Mood Range: Normal range, Flexible  Affect/Mood Display: Congruent w/content  Cognition: Alert, Oriented  Evidence of Imminent Suicide Risk: No  Evidence of imminent homicide risk: No  Therapeutic Interventions: Cognitive clarification, Relapse prevention  Progress Toward Treatment Goal: Mild improvement

## 2019-07-30 NOTE — CARE PLAN
"Problem: Mood Instability Interfering with Adl’S  Goal: Abstinence    Intervention: Assertiveness   [facility]  Therapy Progress Note    Patient Name: Lucy Al  Patient MRN: [id number]  Today's Date: 7/30/2019     Type of session:Individual psychotherapy  Length of session: 30 minutes  Persons in attendance:Patient    Subjective/New Info: Lucy was more open in session today. She was still upset about her dad; however, she was about to make statements about her feelings which she has not done before. We discussed how she uses different \"personas\" to get what she wants/needs out of different situations. Her father activates a lot of scare in Lucy with statements such as \"you are a bitch\" and \"you are a whore\" that trigger her and provoke feelings of inadequacy and low self worth. She protects herself by only showing anger. The idea of assertive communication being for her and not to benefit her father was discussed. As well as the idea that possibly her father is not a safe place for her to actually show her true feelings being vulnerable to him in that way. She stated that when she sees daughters hugging or kissing their dad's on the check she thinks that it is wrong. She does not like it at all. Her idea of a father is a physically distant, angry, selfish, and unsafe person. She still maintains that she has maintained her sobriety since beginning program.     Objective/Observations:   Participation: Active verbal participation, Limited verbal participation, Attentive, Engaged and Open to feedback   Grooming: Casual and Neat   Cognition: Alert and Fully Oriented   Eye contact: Good   Mood: Euthymic   Affect: Congruent with content and Sad   Thought process: Logical and Goal-directed   Speech: Rate within normal limits and Volume within normal limits   Other:     Diagnoses: F11.20    Current risk:   SUICIDE: Low   Homicide: Low   Self-harm: Low   Relapse: Moderate   Other:    Safety Plan " "reviewed? No   If evidence of imminent risk is present, intervention/plan:     Therapeutic Intervention(s): Establish rapport, Maladaptive behavior addressed, Parenting/familial roles addressed, Positive behavior reinforced and Supportive psychotherapy    Treatment Goal(s)/Objective(s) addressed: Assertive communication, managing emotions     Progress toward Treatment Goals: Mild improvement    Plan:  - Continue Individual therapy, Group therapy, Medication management, Intensive Outpatient Program and 12-Step participation  - \"Homework\" recommendation: practice using assertive communication formula \"when you__, I feel ___\"  - Next appointment scheduled:  8/5/2019  - Patient is in agreement with the above plan:  YES    Letty Marie CPC-I  7/30/2019                                       "

## 2019-07-31 ENCOUNTER — HOSPITAL ENCOUNTER (OUTPATIENT)
Dept: BEHAVIORAL HEALTH | Facility: MEDICAL CENTER | Age: 24
End: 2019-07-31
Attending: PSYCHIATRY & NEUROLOGY
Payer: COMMERCIAL

## 2019-07-31 ENCOUNTER — TELEPHONE (OUTPATIENT)
Dept: BEHAVIORAL HEALTH | Facility: MEDICAL CENTER | Age: 24
End: 2019-07-31

## 2019-07-31 DIAGNOSIS — F11.20 OPIOID USE DISORDER, MODERATE, DEPENDENCE (HCC): ICD-10-CM

## 2019-07-31 PROCEDURE — 90853 GROUP PSYCHOTHERAPY: CPT

## 2019-07-31 NOTE — BH THERAPY
Group Therapy Checklist  Attendance: Attended  Attendance Duration (min): (90)  Program/Group: Intensive Outpatient Program  Topics Covered: (Group Therapy)  Participation: Limited verbal participation, Guarded/resistant.  Pt left group after 25 minutes and did not return.  She told her  that she was done with Program and left.  Affect/Mood Range: Flexible  Affect/Mood Display: Congruent w/content  Cognition: Alert, Oriented  Evidence of Imminent Suicide Risk: No  Evidence of imminent homicide risk: No  Therapeutic Interventions: Emotion clarification, Values clarification  Progress Toward Treatment Goal: Mild improvement

## 2019-07-31 NOTE — BH THERAPY
Group Therapy Checklist  Attendance: Attended  Attendance Duration (min): (60)  Number of Participants: 9  Program/Group: Intensive Outpatient Program  Topics Covered: Chalk talk  Participation: Active verbal participation, Attentive, Supportive to other group members, Open to feedback  Affect/Mood Range: Normal range  Affect/Mood Display: Congruent w/content  Cognition: Alert, Oriented  Evidence of Imminent Suicide Risk: No  Evidence of imminent homicide risk: No  Therapeutic Interventions: Psychoeducation re: Chemical use   Progress Toward Treatment Goal: Mild improvement

## 2019-07-31 NOTE — TELEPHONE ENCOUNTER
[facility]  TRANSFER/DISCHARGE SUMMARY FORM    HHPI / SCP: HHP Other Ins.:      Patient Name: Lucy Al  Admission Date: 19  Level of Care Attended:  Intens.OP : 1995  Transfer/Discharge Date: MRN: [id number]  19       SIGNIFICANT FINDINGS/CLINICAL IMPRESSION:   DSM Codes: IOP    ICD10 Codes: Opioid use disorder F11.20    Additional problems identified via assessment: None    Treatment Components in Which Patient Participated (check all that apply):  Education group(s), Medication Management and Group Therapy    Summary of Course of Treatment: Participation in education forums, process groups and individual counseling sessions.     Condition at Time of Transfer/Discharge: Case administratively closed due to Lucy stating that today was going to be her last day    [] Medications Reviewed with Copy to Patient    Referred to: Refer to [facility] and individual therapist Melody      Patient is in agreement with discharge plan: yes    Letty Marie CPC-I

## 2019-09-14 ENCOUNTER — DOCUMENTATION (OUTPATIENT)
Dept: OCCUPATIONAL MEDICINE | Facility: CLINIC | Age: 24
End: 2019-09-14

## 2019-09-19 ENCOUNTER — TELEPHONE (OUTPATIENT)
Dept: BEHAVIORAL HEALTH | Facility: MEDICAL CENTER | Age: 24
End: 2019-09-19

## 2019-09-19 NOTE — TELEPHONE ENCOUNTER
Called following up seeing how patient is doing in terms of their mental health and general well-being after being out of program for 30 days. Was unable to LM on this number.

## 2019-09-26 ENCOUNTER — IMMUNIZATION (OUTPATIENT)
Dept: OCCUPATIONAL MEDICINE | Facility: CLINIC | Age: 24
End: 2019-09-26

## 2019-09-26 DIAGNOSIS — Z23 NEED FOR VACCINATION: ICD-10-CM

## 2019-09-26 PROCEDURE — 90686 IIV4 VACC NO PRSV 0.5 ML IM: CPT | Performed by: PREVENTIVE MEDICINE

## 2019-10-01 ENCOUNTER — EH NON-PROVIDER (OUTPATIENT)
Dept: OCCUPATIONAL MEDICINE | Facility: CLINIC | Age: 24
End: 2019-10-01

## 2019-10-01 DIAGNOSIS — Z02.89 ENCOUNTER FOR OCCUPATIONAL HEALTH EXAMINATION INVOLVING RESPIRATOR: Primary | ICD-10-CM

## 2019-10-01 PROCEDURE — 94375 RESPIRATORY FLOW VOLUME LOOP: CPT | Performed by: NURSE PRACTITIONER

## 2019-10-22 DIAGNOSIS — F33.1 MODERATE EPISODE OF RECURRENT MAJOR DEPRESSIVE DISORDER (HCC): ICD-10-CM

## 2019-10-22 RX ORDER — SERTRALINE HYDROCHLORIDE 100 MG/1
TABLET, FILM COATED ORAL
Qty: 90 TAB | Refills: 3 | Status: SHIPPED | OUTPATIENT
Start: 2019-10-22 | End: 2020-01-29 | Stop reason: SDUPTHER

## 2019-10-23 ENCOUNTER — OFFICE VISIT (OUTPATIENT)
Dept: BEHAVIORAL HEALTH | Facility: CLINIC | Age: 24
End: 2019-10-23
Payer: COMMERCIAL

## 2019-10-23 VITALS
WEIGHT: 128 LBS | SYSTOLIC BLOOD PRESSURE: 111 MMHG | DIASTOLIC BLOOD PRESSURE: 70 MMHG | HEART RATE: 76 BPM | HEIGHT: 59 IN | BODY MASS INDEX: 25.8 KG/M2

## 2019-10-23 DIAGNOSIS — F11.21 OPIOID USE DISORDER, MODERATE, IN EARLY REMISSION (HCC): ICD-10-CM

## 2019-10-23 DIAGNOSIS — F41.9 ANXIETY DISORDER, UNSPECIFIED TYPE: ICD-10-CM

## 2019-10-23 DIAGNOSIS — F33.41 MDD (MAJOR DEPRESSIVE DISORDER), RECURRENT, IN PARTIAL REMISSION (HCC): ICD-10-CM

## 2019-10-23 PROCEDURE — 99214 OFFICE O/P EST MOD 30 MIN: CPT | Performed by: PSYCHIATRY & NEUROLOGY

## 2019-10-23 NOTE — PROGRESS NOTES
"PSYCHIATRY FOLLOW-UP NOTE      Chief Complaint   Patient presents with   • Follow-Up     depression, anxiety         History Of Present Illness:  Lucy Al is a 23 y.o. old female with major depressive disorder, anxiety disorder, opioid use disorder comes in today for follow up, was last seen 3 months ago.  She has been feeling better since her last appointment with me.  She continues to be sober from opioids and denies any cravings for it.  She still maintaining her distance from her ex-boyfriend but continues to miss him.  She does agree that that is not a healthy relationship for her and she is trying hard to get out of debt and rebuilt the trust that she has lost with her parents.  She is having some issues with her parents especially with her dad.  She has been compliant with Zoloft and endorses benefit.  She is no longer using the hydroxyzine as she is feeling better.  Sleep and appetite have been good.  She continues to work at 2 of her jobs and denies any work-related stressors.  She has been seeing her therapist on a monthly basis and endorses benefit with therapy as well.  She denies having thoughts of wanting to hurt herself or others.    Social History:   She is currently single, has never been  and has no kids.  She lives in [place] with her parents who are .  Her parents got  when she was 12 years old.  She has an older half brother who lives in [place] and her older half sister who lives in [place].  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 (Patient not taking: Reported on 10/23/2019) 30 Tab 0   • vitamin D (CHOLECALCIFEROL) 1000 UNIT Tab Take 1,000 Units by mouth every day.       No current facility-administered medications for this visit.        Review Of Systems:    Constitutional - Positive for fatigue  Respiratory - Negative for shortness of breath, cough  CVS - Negative for chest pain, palpitations  GI - Negative for nausea, vomiting, abdominal pain, diarrhea, constipation  Musculoskeletal - Negative for back pain  Neurological - Negative for headaches  Psychiatric - Positive for infrequent depression, anxiety    Physical Examination:  Vital signs: /70   Pulse 76   Ht 1.499 m (4' 11\")   Wt 58.1 kg (128 lb)   BMI 25.85 kg/m²     Musculoskeletal: Normal gait. No abnormal movements.     Mental Status Evaluation:   General: Young  female, dressed in casual attire, good grooming and hygiene, in no apparent distress, calm and cooperative, good eye contact, no psychomotor agitation or retardation  Orientation: Alert and oriented to person, place and time  Recent and remote memory: Grossly intact  Attention span and concentration: Grossly intact  Speech: Spontaneous, normal rate, rhythm and tone  Thought Process: Linear, logical and goal directed  Thought Content: Denies suicidal or homicidal ideations, intent or plan  Perception: Denies auditory or visual hallucinations. No delusions noted  Associations: Intact  Language: Appropriate  Fund of knowledge and vocabulary: Grossly adequate  Mood: \"fine\"  Affect: Dysphoric at times, mood congruent  Insight: Good  Judgment: Good    Depression screening:  Depression Screen (PHQ-2/PHQ-9) 3/19/2018 8/22/2018 7/1/2019   PHQ-2 Total Score - 4 -   PHQ-2 Total Score 0 - 3   PHQ-9 Total Score - 22 -   PHQ-9 Total Score - - 12       Interpretation of PHQ-9 Total Score   Score Severity   1-4 No " Depression   5-9 Mild Depression   10-14 Moderate Depression   15-19 Moderately Severe Depression   20-27 Severe Depression    Medical Records/Labs/Diagnostic Tests Reviewed:  [place]  records - no prescribed controlled medications found in the last 1 year      Impression:  1.  Major depressive disorder, recurrent, in partial remission - improving  2.  Anxiety disorder, unspecified type - improving  3.  Opioid use disorder, moderate, in early remission (last used heroin 7/6/19, last used Oxycodone 2/2019) - improving     Plan:  1.  Continue Zoloft 100 mg daily for depression and anxiety  2.  Discontinue Hydroxyzine 25 mg as she is no longer using it  3.  Continue to maintain sobriety from opioids and other illicit drug  4.  Continue individual psychotherapy with CEIC Hernandez     Return to clinic in 4 months or sooner if symptoms worsen    The proposed treatment plan was discussed with the patient who was provided the opportunity to ask questions and make suggestions regarding alternative treatment. Patient verbalized understanding and expressed agreement with the plan.     Kira Onofre M.D.  10/23/19    This note was created using voice recognition software (Dragon). The accuracy of the dictation is limited by the abilities of the software. I have reviewed the note prior to signing, however some errors in grammar and context are still possible. If you have any questions related to this note please do not hesitate to contact our office.

## 2019-12-11 ENCOUNTER — TELEPHONE (OUTPATIENT)
Dept: BEHAVIORAL HEALTH | Facility: MEDICAL CENTER | Age: 24
End: 2019-12-11

## 2019-12-12 NOTE — TELEPHONE ENCOUNTER
Called following up seeing how patient is doing in terms of their mental health and general well-being after being out of program for 6 months. LM to call me back at [phone] if further assistance is needed and we can offer additional resources.

## 2020-01-06 ENCOUNTER — TELEPHONE (OUTPATIENT)
Dept: BEHAVIORAL HEALTH | Facility: MEDICAL CENTER | Age: 25
End: 2020-01-06

## 2020-01-06 NOTE — TELEPHONE ENCOUNTER
"Lucy returned my follow up phone call. States that she is still seeing her individual therapist regularly. She has maintained sobriety since program. She is still working multiple jobs and in relationships; however, feels that she is \"doing well\" and communicated better with her parents. I reiterated that she can call us if she ever needs other resources.   "

## 2020-01-29 DIAGNOSIS — F33.1 MODERATE EPISODE OF RECURRENT MAJOR DEPRESSIVE DISORDER (HCC): ICD-10-CM

## 2020-01-29 RX ORDER — SERTRALINE HYDROCHLORIDE 100 MG/1
100 TABLET, FILM COATED ORAL
Qty: 90 TAB | Refills: 1 | Status: SHIPPED | OUTPATIENT
Start: 2020-01-29 | End: 2020-06-24 | Stop reason: SDUPTHER

## 2020-01-29 NOTE — TELEPHONE ENCOUNTER
Received request via: Patient    Was the patient seen in the last year in this department? Yes    Does the patient have an active prescription (recently filled or refills available) for medication(s) requested? No

## 2020-02-03 ENCOUNTER — OFFICE VISIT (OUTPATIENT)
Dept: MEDICAL GROUP | Facility: PHYSICIAN GROUP | Age: 25
End: 2020-02-03
Payer: COMMERCIAL

## 2020-02-03 VITALS
RESPIRATION RATE: 16 BRPM | BODY MASS INDEX: 25.8 KG/M2 | HEART RATE: 77 BPM | DIASTOLIC BLOOD PRESSURE: 70 MMHG | HEIGHT: 59 IN | WEIGHT: 128 LBS | OXYGEN SATURATION: 96 % | TEMPERATURE: 98.4 F | SYSTOLIC BLOOD PRESSURE: 98 MMHG

## 2020-02-03 DIAGNOSIS — F33.41 MDD (MAJOR DEPRESSIVE DISORDER), RECURRENT, IN PARTIAL REMISSION (HCC): ICD-10-CM

## 2020-02-03 DIAGNOSIS — M79.675 TOE PAIN, LEFT: ICD-10-CM

## 2020-02-03 DIAGNOSIS — Q68.1 CONGENITAL HAND DEFORMITY: ICD-10-CM

## 2020-02-03 DIAGNOSIS — F41.9 ANXIETY DISORDER, UNSPECIFIED TYPE: ICD-10-CM

## 2020-02-03 DIAGNOSIS — F11.21 OPIOID USE DISORDER, MODERATE, IN EARLY REMISSION (HCC): ICD-10-CM

## 2020-02-03 PROCEDURE — 99214 OFFICE O/P EST MOD 30 MIN: CPT | Performed by: FAMILY MEDICINE

## 2020-02-03 NOTE — PROGRESS NOTES
"Subjective:   Lucy Al is a 24 y.o. female here today for evaluation of left second toe pain. She is also requesting a referral to podiatry.     Left foot pain  This is a new issue to me today. Patient reports chronic left toe pain that has occurred intermittently for the past several years. Her symptoms are primarily located to her left second toe. Last week, she was running to catch a bus to work exacerbating her toe pain. That afternoon around 1 PM, she had moderate soreness and associated swelling to her left toe, prompting office visit today. Moving and flexing her foot is reported to worsen her toe pain. She also likes to attend weekly dance class, however has been unable to due to her pain. She denies any pain symptoms or functional difficulties to her right toes or bilateral hands. She has had xrays in the past, as a child, of her hands to evaluate her bone structure and missing joints. Her father has a history of crooked toes and underwent corrective surgery. No family history of Rheumatoid Arthritis. No other recent injuries.    MDD, recurrent, in partial remission  Anxiety disorder, unspecified type  Opioid use disorder, moderate, in early remission   Patient notes that she is currently sober from opiates and regularly follows up with Dr. Onofre (). She is also clean of tobacco for the past 7 months. She tells me that she is feeling \"very well\" overall. Denies SI/HI.    Current medicines (including changes today)  Current Outpatient Medications   Medication Sig Dispense Refill   • sertraline (ZOLOFT) 100 MG Tab Take 1 Tab by mouth every day. 90 Tab 1   • vitamin D (CHOLECALCIFEROL) 1000 UNIT Tab Take 1,000 Units by mouth every day.       No current facility-administered medications for this visit.      She  has a past medical history of Anxiety, Depression, Pericarditis, and Substance abuse ([facility]).    ROS   No fever, joint redness or swelling, no rash.     Objective:     Physical Exam:  BP " "(!) 98/70   Pulse 77   Temp 36.9 °C (98.4 °F)   Resp 16   Ht 1.499 m (4' 11\")   Wt 58.1 kg (128 lb)   SpO2 96%  Body mass index is 25.85 kg/m².   Constitutional: Alert, no distress, well-groomed.  Skin: Warm, dry, good turgor, no rashes in visible areas.  Eye: Equal, round and reactive, conjunctiva clear, lids normal.  ENMT: Lips without lesions, good dentition, moist mucous membranes.  Neck: Trachea midline, no masses, no thyromegaly.  Respiratory: Unlabored respiratory effort, no cough.  Abdomen: Soft, no gross masses.  MSK: Normal gait, moves all extremities.  -Hands: Bilateral hands with 2nd-4th phalanges equal length. 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   -Feet: Limited exam performed of left foot, normal range of motion of left ankle, foot, and toes. Second left toe has a slight valgus deformity and there is tenderness and mild edema of the left second PIP joint.  Neuro: Grossly non-focal. No cranial nerve deficit. Strength and sensation intact.   Psych: Alert and oriented x3, normal affect and mood.     Assessment and Plan:     1. Toe pain, left  This is a new complaint to me today. On examination, she has mild tenderness and edema. She appears to also have some congenital hand deformities. Ordered x-ray of her left foot to further evaluate her symptoms. We will contact her in regards to the results. Depending on results, may refer to podiatry.  - DX-FOOT-COMPLETE 3+ LEFT; Future    2. Congenital hand deformity  Upon examination, she has noticeable absence of 4th MCP joints to bilateral hands. She has no pain or functional difficulties. Ordered xray of bilateral hands to evaluate this. We will contact her in regards to the results.    - DX-JOINT SURVEY-HANDS SINGLE VIEW; Future    3. MDD (major depressive disorder), recurrent, in partial remission (HCC)  Chronic history and stable. Patient has improved symptoms and currently follows up with Dr. Onofre ().     4. Anxiety disorder, " unspecified type  Same as #3    5. Opioid use disorder, moderate, in early remission (HCC)  Chronic history and stable. Patient has been sober for 7 months and continues to follow up with Dr. Onofre ().      Followup: Return if symptoms worsen or fail to improve.          Yaw CAMPOS (Scribe), am scribing for, and in the presence of, Zoraida Chavez MD    Electronically signed by: Yaw Gutierrez ([place]), 2/3/2020    IZoraida MD personally performed the services described in this documentation, as scribed by Yaw Gutierrez in my presence, and it is both accurate and complete.

## 2020-02-04 ENCOUNTER — HOSPITAL ENCOUNTER (OUTPATIENT)
Dept: RADIOLOGY | Facility: MEDICAL CENTER | Age: 25
End: 2020-02-04
Attending: FAMILY MEDICINE
Payer: COMMERCIAL

## 2020-02-04 DIAGNOSIS — M79.675 TOE PAIN, LEFT: ICD-10-CM

## 2020-02-04 DIAGNOSIS — Q68.1 CONGENITAL HAND DEFORMITY: ICD-10-CM

## 2020-02-04 PROCEDURE — 73630 X-RAY EXAM OF FOOT: CPT | Mod: LT

## 2020-02-04 PROCEDURE — 77077 JOINT SURVEY SINGLE VIEW: CPT

## 2020-02-24 ENCOUNTER — OFFICE VISIT (OUTPATIENT)
Dept: BEHAVIORAL HEALTH | Facility: CLINIC | Age: 25
End: 2020-02-24
Payer: COMMERCIAL

## 2020-02-24 VITALS
HEART RATE: 80 BPM | DIASTOLIC BLOOD PRESSURE: 63 MMHG | SYSTOLIC BLOOD PRESSURE: 108 MMHG | BODY MASS INDEX: 26 KG/M2 | HEIGHT: 59 IN | WEIGHT: 129 LBS

## 2020-02-24 DIAGNOSIS — F33.42 MDD (MAJOR DEPRESSIVE DISORDER), RECURRENT, IN FULL REMISSION (HCC): ICD-10-CM

## 2020-02-24 DIAGNOSIS — F41.9 ANXIETY DISORDER, UNSPECIFIED TYPE: ICD-10-CM

## 2020-02-24 DIAGNOSIS — F11.21 OPIOID USE DISORDER, MODERATE, IN EARLY REMISSION (HCC): ICD-10-CM

## 2020-02-24 PROCEDURE — 99214 OFFICE O/P EST MOD 30 MIN: CPT | Performed by: PSYCHIATRY & NEUROLOGY

## 2020-02-24 ASSESSMENT — PATIENT HEALTH QUESTIONNAIRE - PHQ9: CLINICAL INTERPRETATION OF PHQ2 SCORE: 0

## 2020-02-24 NOTE — PROGRESS NOTES
PSYCHIATRY FOLLOW-UP NOTE      Chief Complaint   Patient presents with   • Follow-Up     depression, anxiety         History Of Present Illness:  Lucy Al is a 24 y.o. old female with major depressive disorder, anxiety disorder, opioid use disorder comes in today for follow up, was last seen 4 months ago.  She has been doing good in regards to her anxiety and depression since her last visit with me.  She continues to work hard on improving herself.  She denies any persistent or prolonged periods of depression.  She does struggle with irritability at times and continues to see her therapist on a regular basis.  She feels that she is currently not satisfied where she is in her life and is thinking about going back to school to be a radiology technician.  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  She states that things are getting better at home as well.  She feels that she has regained almost 99% of her parents trust back.  She had contact with her ex-boyfriend one time in the last few months and is trying to look past that relationship.  She has been sleeping and eating good.  She is contemplating going back to the gym as she has gained some weight lately.  She denies any reckless or impulsive behaviors.  She denies having thoughts of wanting to hurt herself or others.  She reports compliance with Zoloft.    Social History:   She is currently single, has never been  and has no kids.  She lives in [place] with her parents who are .  Her parents got  when she was 12 years old.  She has an older half brother who lives in [place] and her older half sister who lives in [place].  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    Substance Use:  Alcohol - Denies  Nicotine - Denies   Illicit drugs - She is smoking cannabis on a daily basis but continues to stay sober from opioids and  "heroin    Past Medication Trials:  Prozac at age 16 (\"never gave it a chance\"), Wellbutrin at age 16 (\"never gave it a chance\")    Medications:  Current Outpatient Medications   Medication Sig Dispense Refill   • sertraline (ZOLOFT) 100 MG Tab Take 1 Tab by mouth every day. 90 Tab 1   • vitamin D (CHOLECALCIFEROL) 1000 UNIT Tab Take 1,000 Units by mouth every day.       No current facility-administered medications for this visit.        Review Of Systems:    Constitutional - Negative for fatigue  Respiratory - Negative for shortness of breath, cough  CVS - Negative for chest pain, palpitations  GI - Negative for nausea, vomiting, abdominal pain, diarrhea, constipation  Musculoskeletal - Negative for back pain  Neurological - Negative for headaches  Psychiatric - Negative for depression, anxiety, sleep problems.  Positive for infrequent irritability    Physical Examination:  Vital signs: /63   Pulse 80   Ht 1.499 m (4' 11\")   Wt 58.5 kg (129 lb)   BMI 26.05 kg/m²     Musculoskeletal: Normal gait. No abnormal movements.     Mental Status Evaluation:   General: Young  female, dressed in casual attire, good grooming and hygiene, in no apparent distress, calm and cooperative, good eye contact, no psychomotor agitation or retardation  Orientation: Alert and oriented to person, place and time  Recent and remote memory: Grossly intact  Attention span and concentration: Grossly intact  Speech: Spontaneous, normal rate, rhythm and tone  Thought Process: Linear, logical and goal directed  Thought Content: Denies suicidal or homicidal ideations, intent or plan  Perception: Denies auditory or visual hallucinations. No delusions noted  Associations: Intact  Language: Appropriate  Fund of knowledge and vocabulary: Grossly adequate  Mood: \"good\"  Affect: Euthymic, mood congruent  Insight: Good  Judgment: Good    Depression screening:  Depression Screen (PHQ-2/PHQ-9) 8/22/2018 7/1/2019 2/24/2020   PHQ-2 Total " Score 4 - -   PHQ-2 Total Score - 3 0   PHQ-9 Total Score 22 - -   PHQ-9 Total Score - 12 -       Interpretation of PHQ-9 Total Score   Score Severity   1-4 No Depression   5-9 Mild Depression   10-14 Moderate Depression   15-19 Moderately Severe Depression   20-27 Severe Depression    Medical Records/Labs/Diagnostic Tests Reviewed:  [place]  records - no prescribed controlled medications found in the last 1 year      Impression:  1.  Major depressive disorder, recurrent, in full remission - improving  2.  Anxiety disorder, unspecified type - stable  3.  Opioid use disorder, moderate, in early remission (last used heroin 7/6/19, last used Oxycodone 2/2019) - stable    Plan:  1.  Continue Zoloft 100 mg daily for depression and anxiety  2.  Continue to maintain sobriety from opioids and other illicit drug  3.  Continue individual psychotherapy with CECI Hernandez     Return to clinic in 4 months or sooner if symptoms worsen    The proposed treatment plan was discussed with the patient who was provided the opportunity to ask questions and make suggestions regarding alternative treatment. Patient verbalized understanding and expressed agreement with the plan.     Kira Onofre M.D.  02/24/20    This note was created using voice recognition software (Dragon). The accuracy of the dictation is limited by the abilities of the software. I have reviewed the note prior to signing, however some errors in grammar and context are still possible. If you have any questions related to this note please do not hesitate to contact our office.

## 2020-06-22 ENCOUNTER — HOSPITAL ENCOUNTER (OUTPATIENT)
Facility: MEDICAL CENTER | Age: 25
End: 2020-06-22
Attending: FAMILY MEDICINE
Payer: COMMERCIAL

## 2020-06-22 ENCOUNTER — OFFICE VISIT (OUTPATIENT)
Dept: MEDICAL GROUP | Facility: PHYSICIAN GROUP | Age: 25
End: 2020-06-22
Payer: COMMERCIAL

## 2020-06-22 VITALS
HEART RATE: 85 BPM | TEMPERATURE: 97.9 F | WEIGHT: 125 LBS | SYSTOLIC BLOOD PRESSURE: 112 MMHG | RESPIRATION RATE: 16 BRPM | DIASTOLIC BLOOD PRESSURE: 62 MMHG | OXYGEN SATURATION: 97 % | HEIGHT: 59 IN | BODY MASS INDEX: 25.2 KG/M2

## 2020-06-22 DIAGNOSIS — Z01.419 WELL WOMAN EXAM WITH ROUTINE GYNECOLOGICAL EXAM: ICD-10-CM

## 2020-06-22 DIAGNOSIS — Z12.4 CERVICAL CANCER SCREENING: ICD-10-CM

## 2020-06-22 DIAGNOSIS — Z30.011 ENCOUNTER FOR INITIAL PRESCRIPTION OF CONTRACEPTIVE PILLS: ICD-10-CM

## 2020-06-22 DIAGNOSIS — Z11.51 SCREENING FOR HPV (HUMAN PAPILLOMAVIRUS): ICD-10-CM

## 2020-06-22 LAB
INT CON NEG: NEGATIVE
INT CON POS: POSITIVE
POC URINE PREGNANCY TEST: NEGATIVE

## 2020-06-22 PROCEDURE — 88175 CYTOPATH C/V AUTO FLUID REDO: CPT

## 2020-06-22 PROCEDURE — 81025 URINE PREGNANCY TEST: CPT | Performed by: FAMILY MEDICINE

## 2020-06-22 PROCEDURE — 99000 SPECIMEN HANDLING OFFICE-LAB: CPT | Performed by: FAMILY MEDICINE

## 2020-06-22 PROCEDURE — 99395 PREV VISIT EST AGE 18-39: CPT | Performed by: FAMILY MEDICINE

## 2020-06-22 RX ORDER — NORETHINDRONE ACETATE AND ETHINYL ESTRADIOL 1.5-30(21)
1 KIT ORAL DAILY
Qty: 84 TAB | Refills: 1 | Status: SHIPPED | OUTPATIENT
Start: 2020-06-22 | End: 2020-09-08 | Stop reason: SDUPTHER

## 2020-06-22 ASSESSMENT — LIFESTYLE VARIABLES
DURING THE PAST 12 MONTHS, ON HOW MANY DAYS DID YOU USE ANY TOBACCO OR NICOTINE PRODUCTS: 0
DURING THE PAST 12 MONTHS, ON HOW MANY DAYS DID YOU USE ANYTHING ELSE TO GET HIGH: 0
DURING THE PAST 12 MONTHS, ON HOW MANY DAYS DID YOU DRINK MORE THAN A FEW SIPS OF BEER, WINE, OR ANY DRINK CONTAINING ALCOHOL: 10
DO YOU EVER USE ALCOHOL OR DRUGS WHILE YOU ARE BY YOURSELF ALONE: NO
HAVE YOU EVER RIDDEN IN A CAR DRIVEN BY SOMEONE WHO WAS HIGH OR HAD BEEN USING ALCOHOL OR DRUGS: NO
DO YOU EVER USE ALCOHOL OR DRUGS TO RELAX, FEEL BETTER ABOUT YOURSELF, OR FIT IN: NO
HAVE YOU EVER GOTTEN IN TROUBLE WHILE YOU WERE USING ALCOHOL OR DRUGS: YES
DO YOUR FAMILY OR FRIENDS EVER TELL YOU THAT YOU SHOULD CUT DOWN ON YOUR DRINKING OR DRUG USE: NO
DO YOU EVER FORGET THINGS YOU DID WHILE USING ALCOHOL OR DRUGS: YES
DURING THE PAST 12 MONTHS, ON HOW MANY DAYS DID YOU USE ANY MARIJUANA: 5
PART A TOTAL SCORE: 15

## 2020-06-22 NOTE — PROGRESS NOTES
Subjective:     CC:   Chief Complaint   Patient presents with   • Annual Exam     with pap     HPI:   Lucy Al is a 24 y.o. female who presents for annual exam    Patient has GYN provider: No   Last Pap Smear: Never   H/O Abnormal Pap: No  Last Mammogram: N/A  Last Bone Density Test: N/A  Last Colorectal Cancer Screening: N/A  Last Tdap: 2019  Received HPV series: Yes    Exercise: sporadic irregular exercise, <half hour walking weekly  Diet: healthy      LMP: 2020  Hx STDs: No  Birth control: None current, she would like to start OCP's  Menses every month with 3-4 days with light bleeding.  Denies cramping.  No significant bloating/fluid retention, pelvic pain, or dyspareunia. No abnormal vaginal discharge.  No breast tenderness, mass, nipple discharge, changes in size or contour, or abnormal cyclic discomfort.    Patient was screened using CRAFFT, and the patient had a positive screening.  I performed a brief intervention in the clinic.  [facility]  Denies current tobacco, alcohol or drug use.    OB History    Para Term  AB Living   0 0 0 0 0 0   SAB TAB Ectopic Molar Multiple Live Births   0 0 0 0 0 0      She  reports previously being sexually active and has had partner(s) who are Male.    She  has a past medical history of Anxiety, Depression, Pericarditis, and Substance abuse (HCC).  She  has no past surgical history on file.    Family History   Problem Relation Age of Onset   • Alcohol/Drug Father         alcoholism   • Psychiatric Illness Father         bipolar disorder   • Bipolar disorder Father    • Drug abuse Father         methamphetamine addiction     Social History     Tobacco Use   • Smoking status: Former Smoker   • Smokeless tobacco: Never Used   Substance Use Topics   • Alcohol use: Yes     Alcohol/week: 0.0 oz     Comment: socially   • Drug use: Not Currently     Patient Active Problem List    Diagnosis Date Noted   • Congenital  "hand deformity 02/03/2020   • Opioid use disorder, moderate, in early remission (formerly [facility]) 07/12/2019   • MDD (major depressive disorder), recurrent, in full remission (formerly [facility]) 07/01/2019   • Anxiety disorder 07/01/2019   • Hordeolum externum of left upper eyelid 10/22/2018   • Vitamin D deficiency 01/13/2017     Current Outpatient Medications   Medication Sig Dispense Refill   • sertraline (ZOLOFT) 100 MG Tab Take 1 Tab by mouth every day. 90 Tab 1   • vitamin D (CHOLECALCIFEROL) 1000 UNIT Tab Take 1,000 Units by mouth every day.       No current facility-administered medications for this visit.      No Known Allergies    Review of Systems  Constitutional: Negative for fever, chills and malaise/fatigue.   HENT: Negative for congestion.    Eyes: Negative for pain.   Respiratory: Negative for cough and shortness of breath.    Cardiovascular: Negative for chest pain and leg swelling.   Gastrointestinal: Negative for nausea, vomiting, abdominal pain and diarrhea.   Genitourinary: Negative for dysuria and hematuria.   Skin: Negative for rash.   Neurological: Negative for dizziness, focal weakness and headaches.   Endo/Heme/Allergies: Does not bruise/bleed easily.   Psychiatric/Behavioral: Negative for depression.  The patient is not nervous/anxious.      Objective:   /62   Pulse 85   Temp 36.6 °C (97.9 °F)   Resp 16   Ht 1.499 m (4' 11\")   Wt 56.7 kg (125 lb)   SpO2 97%   BMI 25.25 kg/m²     Wt Readings from Last 4 Encounters:   06/22/20 56.7 kg (125 lb)   02/24/20 58.5 kg (129 lb)   02/03/20 58.1 kg (128 lb)   10/23/19 58.1 kg (128 lb)     A chaperone was offered to the patient during today's exam. Patient declined chaperone.    Physical Exam:  Constitutional: Well-developed and well-nourished. Not diaphoretic. No distress.   Skin: Skin is warm and dry. No rash noted.  Head: Atraumatic without lesions.  Eyes: Conjunctivae and extraocular motions are normal. Pupils are equal, round, and reactive to light. No " scleral icterus.   Ears:  External ears unremarkable. Tympanic membranes clear and intact.  Nose: Nares patent. Septum midline. Turbinates without erythema nor edema. No discharge.   Mouth/Throat: Tongue normal. Oropharynx is clear and moist. Posterior pharynx without erythema or exudates.  Neck: Supple, trachea midline. Normal range of motion. No thyromegaly present. No lymphadenopathy--cervical or supraclavicular.  Cardiovascular: Regular rate and rhythm, S1 and S2 without murmur, rubs, or gallops.    Abdomen: Soft, non tender, and without distention. Active bowel sounds in all four quadrants. No rebound, guarding, masses or HSM.  : Perineum and external genitalia normal without rash. Vagina with normal and physiologic discharge. Cervix without visible lesions or discharge.  Extremities: No cyanosis, clubbing, erythema, nor edema. Distal pulses intact and symmetric.   Musculoskeletal: All major joints AROM full in all directions without pain.  Neurological: Alert and oriented x 3. Grossly non-focal. Strength and sensation grossly intact.   Psychiatric:  Behavior, mood, and affect are appropriate.    UPT:     Assessment and Plan:     1. Well woman exam with routine gynecological exam  2. Cervical cancer screening  3. Screening for HPV (human papillomavirus)  Health maintenance:  Up to date   Labs per orders  Immunizations per orders  Patient counseled about skin care, diet, supplements, and exercise.  Discussed  use and side effects of OCP's, family planning choices, adequate intake of calcium and vitamin D  - THINPREP PAP, REFLEX HPV ON ASC-US AND ABOVE; Future  - norethindrone-ethinyl estradiol-iron (LOESTRIN FE 1.5/30) 1.5-30 MG-MCG tablet; Take 1 Tab by mouth every day.  Dispense: 84 Tab; Refill: 1    Follow-up: Return in about 3 months (around 9/22/2020) for f/u birth control, short.

## 2020-06-23 DIAGNOSIS — Z12.4 CERVICAL CANCER SCREENING: ICD-10-CM

## 2020-06-23 DIAGNOSIS — Z11.51 SCREENING FOR HPV (HUMAN PAPILLOMAVIRUS): ICD-10-CM

## 2020-06-23 DIAGNOSIS — Z01.419 WELL WOMAN EXAM WITH ROUTINE GYNECOLOGICAL EXAM: ICD-10-CM

## 2020-06-23 LAB — CYTOLOGY REG CYTOL: NORMAL

## 2020-06-24 ENCOUNTER — TELEMEDICINE (OUTPATIENT)
Dept: BEHAVIORAL HEALTH | Facility: CLINIC | Age: 25
End: 2020-06-24
Payer: COMMERCIAL

## 2020-06-24 VITALS — BODY MASS INDEX: 25.2 KG/M2 | WEIGHT: 125 LBS | HEIGHT: 59 IN

## 2020-06-24 DIAGNOSIS — F41.9 ANXIETY DISORDER, UNSPECIFIED TYPE: ICD-10-CM

## 2020-06-24 DIAGNOSIS — F11.21 OPIOID USE DISORDER, MODERATE, IN EARLY REMISSION (HCC): ICD-10-CM

## 2020-06-24 DIAGNOSIS — F33.42 MDD (MAJOR DEPRESSIVE DISORDER), RECURRENT, IN FULL REMISSION (HCC): ICD-10-CM

## 2020-06-24 PROCEDURE — 99214 OFFICE O/P EST MOD 30 MIN: CPT | Mod: 95,CR | Performed by: PSYCHIATRY & NEUROLOGY

## 2020-06-24 PROCEDURE — 90833 PSYTX W PT W E/M 30 MIN: CPT | Mod: 95,CR | Performed by: PSYCHIATRY & NEUROLOGY

## 2020-06-24 RX ORDER — SERTRALINE HYDROCHLORIDE 100 MG/1
100 TABLET, FILM COATED ORAL
Qty: 90 TAB | Refills: 0 | Status: SHIPPED | OUTPATIENT
Start: 2020-06-24 | End: 2020-09-08

## 2020-06-24 NOTE — PROGRESS NOTES
"PSYCHIATRY TELEMEDICINE FOLLOW-UP NOTE      Chief Complaint   Patient presents with   • Follow-Up     depression, anxiety       This encounter was conducted via Zoom .   Verbal consent was obtained. Patient's identity was verified.    History Of Present Illness:  Lucy Al is a 24 y.o. old female with major depressive disorder, anxiety disorder, opioid use disorder comes in today for follow up, was last seen 4 months ago.  She has been doing all right in regards to her depression and anxiety in the last few months.  She has been compliant with Zoloft and endorses benefit.  She had a chance encounter with her ex-boyfriend last night and he wants to get back in a relationship with her.  She had a little hard day yesterday has a lot of memories from that relationship came back.  She had some alcohol last night but denies drinking excessive amounts.  She states that she had alcohol after about a year and she continues to be sober from both heroin and opiate medications.  She continues to work part-time at [employer] and denies any work-related stressors.  She and her mom are doing really good in regards to the relationship and she does not want to hurt her mother by getting into relationship with her ex again.  She denies any struggles with her sleep or appetite.  She denies having thoughts of wanting to hurt herself or others.    Social History:   She is currently single, has never been  and has no kids.  She lives in [place] with her parents who are .  Her parents got  when she was 12 years old.  She has an older half brother who lives in [place] and her older half sister who lives in [place].  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     Substance Use:  Alcohol - She reports drinking alcohol last night for the first time after not having alcohol for over a year, states that she drank \"responsibly\"  Nicotine - Denies   Illicit drugs - She is smoking cannabis on a daily " "basis but continues to stay sober from opioids and heroin    Past Medication Trials:  Prozac at age 16 (\"never gave it a chance\"), Wellbutrin at age 16 (\"never gave it a chance\")    Medications:  Current Outpatient Medications   Medication Sig Dispense Refill   • norethindrone-ethinyl estradiol-iron (LOESTRIN FE 1.5/30) 1.5-30 MG-MCG tablet Take 1 Tab by mouth every day. 84 Tab 1   • sertraline (ZOLOFT) 100 MG Tab Take 1 Tab by mouth every day. 90 Tab 1   • vitamin D (CHOLECALCIFEROL) 1000 UNIT Tab Take 1,000 Units by mouth every day.       No current facility-administered medications for this visit.        Review Of Systems:    Constitutional - Negative for fatigue  Respiratory - Negative for shortness of breath, cough  CVS - Negative for chest pain, palpitations  GI - Negative for nausea, vomiting, abdominal pain, diarrhea, constipation  Musculoskeletal - Negative for back pain  Neurological - Negative for headaches  Psychiatric - Negative for depression, anxiety, sleep problems    Physical Examination:  Vital signs: Ht 1.499 m (4' 11\")   Wt 56.7 kg (125 lb)   LMP  (LMP Unknown)   Breastfeeding No   BMI 25.25 kg/m²     Musculoskeletal: No abnormal movements.     Mental Status Evaluation:   General: Young female, tearful few times, dressed in casual attire, good grooming and hygiene, in no apparent distress, calm and cooperative, good eye contact, no psychomotor agitation or retardation  Orientation: Alert and oriented to person, place and time  Recent and remote memory: Grossly intact  Attention span and concentration: Grossly intact  Speech: Spontaneous, normal rate, rhythm and tone  Thought Process: Linear, logical and goal directed  Thought Content: Denies suicidal or homicidal ideations, intent or plan  Perception: Denies auditory or visual hallucinations. No delusions noted  Associations: Intact  Language: Appropriate  Fund of knowledge and vocabulary: Grossly adequate  Mood: \"good\"  Affect: Slightly " dysphoric, mood congruent  Insight: Good  Judgment: Good    Depression screening:  Depression Screen (PHQ-2/PHQ-9) 8/22/2018 7/1/2019 2/24/2020   PHQ-2 Total Score 4 - -   PHQ-2 Total Score - 3 0   PHQ-9 Total Score 22 - -   PHQ-9 Total Score - 12 -     Interpretation of PHQ-9 Total Score   Score Severity   1-4 No Depression   5-9 Mild Depression   10-14 Moderate Depression   15-19 Moderately Severe Depression   20-27 Severe Depression    Medical Records/Labs/Diagnostic Tests Reviewed:  [place]  records - no prescribed controlled medications found in the last 1 year      Impression:  1.  Major depressive disorder, recurrent, in full remission - stable  2.  Anxiety disorder, unspecified type - stable  3.  Opioid use disorder, moderate, in early remission (last used heroin 7/6/19, last used Oxycodone 2/2019) - stable    Plan:  1.  Continue Zoloft 100 mg daily for depression and anxiety  2.  Continue to maintain sobriety from opioids and other illicit drugs  3.  Continue individual psychotherapy with CECI Hernandez   4.  Provided supportive psychotherapy (> 16 minutes) in regards to her last relationship and how it affected her emotionally.  Advised caution in regards to her ex and to talk about her feelings rather than holding them back.    Return to clinic in 4 months or sooner if symptoms worsen    The proposed treatment plan was discussed with the patient who was provided the opportunity to ask questions and make suggestions regarding alternative treatment. Patient verbalized understanding and expressed agreement with the plan.     Kira Onofre M.D.  06/24/20    This note was created using voice recognition software (Dragon). The accuracy of the dictation is limited by the abilities of the software. I have reviewed the note prior to signing, however some errors in grammar and context are still possible. If you have any questions related to this note please do not hesitate to contact our office.

## 2020-09-08 ENCOUNTER — PATIENT MESSAGE (OUTPATIENT)
Dept: MEDICAL GROUP | Facility: PHYSICIAN GROUP | Age: 25
End: 2020-09-08

## 2020-09-08 DIAGNOSIS — F41.9 ANXIETY DISORDER, UNSPECIFIED TYPE: ICD-10-CM

## 2020-09-08 DIAGNOSIS — F33.41 MDD (MAJOR DEPRESSIVE DISORDER), RECURRENT, IN PARTIAL REMISSION (HCC): ICD-10-CM

## 2020-09-08 DIAGNOSIS — Z30.011 ENCOUNTER FOR INITIAL PRESCRIPTION OF CONTRACEPTIVE PILLS: ICD-10-CM

## 2020-09-08 RX ORDER — SERTRALINE HYDROCHLORIDE 25 MG/1
25 TABLET, FILM COATED ORAL DAILY
Qty: 90 TAB | Refills: 1 | Status: SHIPPED | OUTPATIENT
Start: 2020-09-08 | End: 2020-09-21

## 2020-09-08 RX ORDER — NORETHINDRONE ACETATE AND ETHINYL ESTRADIOL 1.5-30(21)
1 KIT ORAL DAILY
Qty: 84 TAB | Refills: 4 | Status: SHIPPED | OUTPATIENT
Start: 2020-09-08 | End: 2020-10-07

## 2020-09-16 ENCOUNTER — EH NON-PROVIDER (OUTPATIENT)
Dept: OCCUPATIONAL MEDICINE | Facility: CLINIC | Age: 25
End: 2020-09-16

## 2020-09-16 ENCOUNTER — HOSPITAL ENCOUNTER (OUTPATIENT)
Facility: MEDICAL CENTER | Age: 25
End: 2020-09-16
Attending: NURSE PRACTITIONER
Payer: COMMERCIAL

## 2020-09-16 DIAGNOSIS — Z11.59 SCREENING FOR VIRAL DISEASE: ICD-10-CM

## 2020-09-16 LAB — COVID ORDER STATUS COVID19: NORMAL

## 2020-09-16 PROCEDURE — U0003 INFECTIOUS AGENT DETECTION BY NUCLEIC ACID (DNA OR RNA); SEVERE ACUTE RESPIRATORY SYNDROME CORONAVIRUS 2 (SARS-COV-2) (CORONAVIRUS DISEASE [COVID-19]), AMPLIFIED PROBE TECHNIQUE, MAKING USE OF HIGH THROUGHPUT TECHNOLOGIES AS DESCRIBED BY CMS-2020-01-R: HCPCS | Mod: CS | Performed by: NURSE PRACTITIONER

## 2020-09-17 ENCOUNTER — TELEPHONE (OUTPATIENT)
Dept: OCCUPATIONAL MEDICINE | Facility: CLINIC | Age: 25
End: 2020-09-17

## 2020-09-17 LAB
SARS-COV-2 RNA RESP QL NAA+PROBE: NOTDETECTED
SPECIMEN SOURCE: NORMAL

## 2020-09-17 NOTE — TELEPHONE ENCOUNTER
Phone Number Called: [phone] (home)       Call outcome: Spoke to patient regarding message below.    Message: patient is negative for covid. Patient is to follow up with IP.

## 2020-09-21 ENCOUNTER — APPOINTMENT (OUTPATIENT)
Dept: MEDICAL GROUP | Facility: PHYSICIAN GROUP | Age: 25
End: 2020-09-21
Payer: COMMERCIAL

## 2020-09-21 ENCOUNTER — TELEMEDICINE (OUTPATIENT)
Dept: BEHAVIORAL HEALTH | Facility: CLINIC | Age: 25
End: 2020-09-21
Payer: COMMERCIAL

## 2020-09-21 VITALS — HEIGHT: 59 IN | BODY MASS INDEX: 25.25 KG/M2

## 2020-09-21 DIAGNOSIS — F33.0 MDD (MAJOR DEPRESSIVE DISORDER), RECURRENT EPISODE, MILD (HCC): ICD-10-CM

## 2020-09-21 DIAGNOSIS — F11.21 OPIOID USE DISORDER, MODERATE, IN SUSTAINED REMISSION (HCC): ICD-10-CM

## 2020-09-21 DIAGNOSIS — F41.9 ANXIETY DISORDER, UNSPECIFIED TYPE: ICD-10-CM

## 2020-09-21 PROCEDURE — 99214 OFFICE O/P EST MOD 30 MIN: CPT | Mod: 95,CR | Performed by: PSYCHIATRY & NEUROLOGY

## 2020-09-21 PROCEDURE — 90833 PSYTX W PT W E/M 30 MIN: CPT | Mod: 95,CR | Performed by: PSYCHIATRY & NEUROLOGY

## 2020-09-21 NOTE — PROGRESS NOTES
PSYCHIATRY VIRTUAL VISIT FOLLOW-UP NOTE      Chief Complaint   Patient presents with   • Follow-Up     depression, anxiety       This evaluation was conducted via Zoom using secure and encrypted videoconferencing technology. The patient was in a private location in the [facility].    The patient's identity was confirmed and verbal consent was obtained for this virtual visit.    History Of Present Illness:  Lucy Al is a 25 y.o. old female with major depressive disorder, anxiety disorder, opioid use disorder comes in today for follow up, was last seen 3 months ago.  She has been struggling with some depression for the last month or so.  She is having hard time moving on from her last relationship which did not end well.  She tried to wean another relationship but was not happy and ended up breaking up last week.  She is having a hard time looking at positive things in her life.  She continues to have good support from her parents but feels that the whole situation with her current political situation and pandemic is making it hard for her to feel any good energy.  She is tearful on and off her appointment.  She understands that the relationship was not good for her and it hurt her in several ways but is having a hard time letting go of the relationship.  She has not used alcohol, heroin or opiate medications to cope with her emotions.  She is also not happy at her job and where she is in her life currently.  She is doing all right in regards to her anxiety.  She denies having thoughts of wanting to hurt herself or others.    She reached out to her PCP, Dr. Chavez couple of weeks ago and asked her that her dose of Zoloft be reduced to 25 mg.  She was under the impression that she was taking 50 mg and not 100 mg so 2 weeks ago her dose was decreased from 100 to 25 mg.    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     Substance Use:  Alcohol - Denies recent use  Nicotine - Denies   Illicit drugs - Smokes cannabis but denies heroin or opiate medication use since her last visit here    Past Medication Trials:  Prozac at age 16 (\"never gave it a chance\"), Wellbutrin at age 16 (\"never gave it a chance\")    Medications:  Current Outpatient Medications   Medication Sig Dispense Refill   • norethindrone-ethinyl estradiol-iron (LOESTRIN FE 1.5/30) 1.5-30 MG-MCG tablet Take 1 Tab by mouth every day. 84 Tab 4   • vitamin D (CHOLECALCIFEROL) 1000 UNIT Tab Take 1,000 Units by mouth every day.       No current facility-administered medications for this visit.        Review Of Systems:    Constitutional - Negative for fatigue  Respiratory - Negative for shortness of breath, cough  CVS - Negative for chest pain, palpitations  GI - Negative for nausea, vomiting, abdominal pain, diarrhea, constipation  Musculoskeletal - Negative for back pain  Neurological - Negative for headaches  Psychiatric - Positive for depression.  Negative for anxiety, sleep problems    Physical Examination:  Vital signs: Ht 1.499 m (4' 11\")   BMI 25.25 kg/m²     Musculoskeletal: No abnormal movements.     Mental Status Evaluation:   General: Young female, tearful few times, dressed in casual attire, good grooming and hygiene, in no apparent distress, calm and cooperative, fair eye contact, psychomotor retardation  Orientation: Alert and oriented to person, place and time  Recent and remote memory: Grossly intact  Attention span and concentration: Grossly intact  Speech: Spontaneous, normal rate, rhythm and tone  Thought Process: Linear, logical and goal directed  Thought Content: Denies suicidal or homicidal ideations, intent or plan  Perception: Denies auditory or visual hallucinations. No delusions " "noted  Associations: Intact  Language: Appropriate  Fund of knowledge and vocabulary: Grossly adequate  Mood: \"fine\"  Affect: Dysphoric, mood congruent  Insight: Good  Judgment: Good    Depression screening:  Depression Screen (PHQ-2/PHQ-9) 8/22/2018 7/1/2019 2/24/2020   PHQ-2 Total Score 4 - -   PHQ-2 Total Score - 3 0   PHQ-9 Total Score 22 - -   PHQ-9 Total Score - 12 -     Interpretation of PHQ-9 Total Score   Score Severity   1-4 No Depression   5-9 Mild Depression   10-14 Moderate Depression   15-19 Moderately Severe Depression   20-27 Severe Depression    Medical Records/Labs/Diagnostic Tests Reviewed:  [place]  records - no prescribed controlled medications found in the last 1 year      Impression:  1.  Major depressive disorder, recurrent, mild - worsening  2.  Anxiety disorder, unspecified type - stable  3.  Opioid use disorder, moderate, in sustained remission (last used heroin 7/2019 and Oxycodone 2/2019) - stable    Plan:  1.  Increase Zoloft to 50 mg daily for depression and anxiety  2.  Continue to maintain sobriety from opioids and other illicit drugs  3.  Continue individual psychotherapy with CECI Hernandez   4.  Provided supportive psychotherapy (> 16 minutes) in regards to her emotions and why she is having a hard time moving on from her relationship with David.  Validated her emotions and her feelings.  Advised her to think about her future as she wants to be a radiology tech and encouraged her to think more seriously about it and look at all feasible options for her.    Return to clinic in 4 weeks or sooner if symptoms worsen    The proposed treatment plan was discussed with the patient who was provided the opportunity to ask questions and make suggestions regarding alternative treatment. Patient verbalized understanding and expressed agreement with the plan.     Kira Onofre M.D.  09/21/20    This note was created using voice recognition software (Dragon). The accuracy of the dictation " is limited by the abilities of the software. I have reviewed the note prior to signing, however some errors in grammar and context are still possible. If you have any questions related to this note please do not hesitate to contact our office.

## 2020-09-25 ENCOUNTER — IMMUNIZATION (OUTPATIENT)
Dept: OCCUPATIONAL MEDICINE | Facility: CLINIC | Age: 25
End: 2020-09-25

## 2020-09-25 DIAGNOSIS — Z23 NEED FOR VACCINATION: ICD-10-CM

## 2020-09-25 PROCEDURE — 90686 IIV4 VACC NO PRSV 0.5 ML IM: CPT | Performed by: NURSE PRACTITIONER

## 2020-10-07 ENCOUNTER — OFFICE VISIT (OUTPATIENT)
Dept: MEDICAL GROUP | Facility: PHYSICIAN GROUP | Age: 25
End: 2020-10-07
Payer: COMMERCIAL

## 2020-10-07 VITALS
WEIGHT: 131 LBS | HEART RATE: 70 BPM | SYSTOLIC BLOOD PRESSURE: 100 MMHG | HEIGHT: 59 IN | BODY MASS INDEX: 26.41 KG/M2 | DIASTOLIC BLOOD PRESSURE: 62 MMHG | RESPIRATION RATE: 14 BRPM | OXYGEN SATURATION: 96 % | TEMPERATURE: 97.7 F

## 2020-10-07 DIAGNOSIS — Z30.41 ENCOUNTER FOR SURVEILLANCE OF CONTRACEPTIVE PILLS: ICD-10-CM

## 2020-10-07 DIAGNOSIS — N92.1 BREAKTHROUGH BLEEDING ON BIRTH CONTROL PILLS: ICD-10-CM

## 2020-10-07 LAB
INT CON NEG: NEGATIVE
INT CON POS: POSITIVE
POC URINE PREGNANCY TEST: NEGATIVE

## 2020-10-07 PROCEDURE — 81025 URINE PREGNANCY TEST: CPT | Performed by: FAMILY MEDICINE

## 2020-10-07 PROCEDURE — 99214 OFFICE O/P EST MOD 30 MIN: CPT | Performed by: FAMILY MEDICINE

## 2020-10-07 RX ORDER — NORGESTIMATE AND ETHINYL ESTRADIOL 0.25-0.035
1 KIT ORAL DAILY
Qty: 84 TAB | Refills: 3 | Status: SHIPPED | OUTPATIENT
Start: 2020-10-07 | End: 2021-07-20

## 2020-10-07 NOTE — PROGRESS NOTES
"Subjective:   Lucy Al is a 25 y.o. female here today for vaginal bleeding.  She is unaccompanied for today's appointment.    -We started her on OCPs at her last appointment in June  -Currently taking Loestrin once a day, takes placebo pills  -She has been having breakthrough spotting almost since we started the OCPs, her periods have been lasting anywhere from 2 weeks to 3 weeks  -The flow is very light, but she often has spotting most days  -It is bothersome to her  -She has not been sexually active for some time  -No new partners    Current medicines (including changes today)  Current Outpatient Medications   Medication Sig Dispense Refill   • norgestimate-ethinyl estradiol (ORTHO-CYCLEN) 0.25-35 MG-MCG per tablet Take 1 Tab by mouth every day. 84 Tab 3   • sertraline (ZOLOFT) 50 MG Tab Take 1 Tab by mouth every day. 90 Tab 0   • vitamin D (CHOLECALCIFEROL) 1000 UNIT Tab Take 1,000 Units by mouth every day.       No current facility-administered medications for this visit.      She  has a past medical history of Anxiety, Depression, Pericarditis, and Substance abuse (HCC).    ROS   No fever, no chills, no abdominal pain, no vaginal discharge     Objective:     Physical Exam:  /62 (BP Location: Right arm, Patient Position: Sitting, BP Cuff Size: Adult)   Pulse 70   Temp 36.5 °C (97.7 °F)   Resp 14   Ht 1.499 m (4' 11\")   Wt 59.4 kg (131 lb)   SpO2 96%  Body mass index is 26.46 kg/m².Constitutional: Alert, no distress, well-groomed.  Skin: Warm, dry, good turgor, no rashes in visible areas.  Eye: Equal, round and reactive, conjunctiva clear, lids normal.  ENMT: Wearing a mask.  Neck: Trachea midline, no masses, no thyromegaly.  Respiratory: Unlabored respiratory effort, no cough.  Abdomen: Soft, no gross masses.  MSK: Normal gait, moves all extremities.  Neuro: Grossly non-focal. No cranial nerve deficit. Strength and sensation intact.   Psych: Alert and oriented x3, normal affect and " mood.    UPT: negative    Assessment and Plan:     1. Breakthrough bleeding on birth control pills  2. Encounter for surveillance of contraceptive pills  This is a new problem.  Most likely her breakthrough bleeding is due to low estrogen in her OCPs.  Will try a different OCP with higher level of estrogen.  UPT negative today.  Discussed return precautions.  - POCT PREGNANCY  - norgestimate-ethinyl estradiol (ORTHO-CYCLEN) 0.25-35 MG-MCG per tablet; Take 1 Tab by mouth every day.  Dispense: 84 Tab; Refill: 3    Followup: Return if symptoms worsen or fail to improve.         PLEASE NOTE: This dictation was created using voice recognition software. I have made every reasonable attempt to correct obvious errors, but I expect that there are errors of grammar and possibly content that I did not discover before finalizing the note.

## 2020-10-19 ENCOUNTER — TELEMEDICINE (OUTPATIENT)
Dept: BEHAVIORAL HEALTH | Facility: CLINIC | Age: 25
End: 2020-10-19
Payer: COMMERCIAL

## 2020-10-19 VITALS — WEIGHT: 130 LBS | HEIGHT: 59 IN | BODY MASS INDEX: 26.21 KG/M2

## 2020-10-19 DIAGNOSIS — F33.0 MDD (MAJOR DEPRESSIVE DISORDER), RECURRENT EPISODE, MILD (HCC): ICD-10-CM

## 2020-10-19 DIAGNOSIS — F41.9 ANXIETY DISORDER, UNSPECIFIED TYPE: ICD-10-CM

## 2020-10-19 DIAGNOSIS — F11.21 OPIOID USE DISORDER, MODERATE, IN SUSTAINED REMISSION (HCC): ICD-10-CM

## 2020-10-19 PROCEDURE — 99214 OFFICE O/P EST MOD 30 MIN: CPT | Mod: 95,CR | Performed by: PSYCHIATRY & NEUROLOGY

## 2020-10-19 ASSESSMENT — PATIENT HEALTH QUESTIONNAIRE - PHQ9
6. FEELING BAD ABOUT YOURSELF - OR THAT YOU ARE A FAILURE OR HAVE LET YOURSELF OR YOUR FAMILY DOWN: NOT AL ALL
4. FEELING TIRED OR HAVING LITTLE ENERGY: NOT AT ALL
5. POOR APPETITE OR OVEREATING: NOT AT ALL
2. FEELING DOWN, DEPRESSED, IRRITABLE, OR HOPELESS: NOT AT ALL
7. TROUBLE CONCENTRATING ON THINGS, SUCH AS READING THE NEWSPAPER OR WATCHING TELEVISION: NOT AT ALL
1. LITTLE INTEREST OR PLEASURE IN DOING THINGS: NOT AT ALL
9. THOUGHTS THAT YOU WOULD BE BETTER OFF DEAD, OR OF HURTING YOURSELF: NOT AT ALL
SUM OF ALL RESPONSES TO PHQ9 QUESTIONS 1 AND 2: 0
8. MOVING OR SPEAKING SO SLOWLY THAT OTHER PEOPLE COULD HAVE NOTICED. OR THE OPPOSITE, BEING SO FIGETY OR RESTLESS THAT YOU HAVE BEEN MOVING AROUND A LOT MORE THAN USUAL: NOT AT ALL
SUM OF ALL RESPONSES TO PHQ QUESTIONS 1-9: 0
3. TROUBLE FALLING OR STAYING ASLEEP OR SLEEPING TOO MUCH: NOT AT ALL

## 2020-10-19 NOTE — PROGRESS NOTES
"PSYCHIATRY VIRTUAL VISIT FOLLOW-UP NOTE      Chief Complaint   Patient presents with   • Follow-Up     depression, anxiety       This evaluation was conducted via Zoom using secure and encrypted videoconferencing technology. The patient was in a private location in the [facility].    The patient's identity was confirmed and verbal consent was obtained for this virtual visit.    History Of Present Illness:  Lucy Al is a 25 y.o. old female with major depressive disorder, anxiety disorder, opioid use disorder comes in today for follow up, was last seen 4 weeks ago.  She has been feeling better in regards to her mental health since her last appointment with me.  She has been taking the higher dose of Zoloft and was also able to reconnect with her therapist and with help.  She has not had any contact with her ex-boyfriend and feels that at least at this time she is moving forward.  She is not having any feelings about that relationship anymore.  She is doing all right in regards to the relationship with her parents and denies any work-related stressors as well.  She reports compliance with Zoloft and denies any side effects.  She feels that the current seems to be helping both her depression and anxiety.  She denies any significant struggles with her sleep or anxiety.  She denies having thoughts of wanting to hurt herself or others.    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     Substance Use:  Alcohol - Denies recent use  Nicotine - Denies   Cannabis -Smokes cannabis daily  Illicit drugs - Denies     Past Medication Trials:  Prozac at age 16 (\"never gave it a chance\"), Wellbutrin at age 16 (\"never gave it a chance\")    Medications:  Current Outpatient Medications   Medication Sig Dispense Refill   • " "norgestimate-ethinyl estradiol (ORTHO-CYCLEN) 0.25-35 MG-MCG per tablet Take 1 Tab by mouth every day. 84 Tab 3   • sertraline (ZOLOFT) 50 MG Tab Take 1 Tab by mouth every day. 90 Tab 0   • vitamin D (CHOLECALCIFEROL) 1000 UNIT Tab Take 1,000 Units by mouth every day.       No current facility-administered medications for this visit.        Review Of Systems:    Constitutional - Negative for fatigue  Respiratory - Negative for shortness of breath, cough  CVS - Negative for chest pain, palpitations  GI - Negative for nausea, vomiting, abdominal pain, diarrhea, constipation  Musculoskeletal - Negative for back pain  Neurological - Negative for headaches  Psychiatric - Positive for depression.  Negative for anxiety, sleep problems    Physical Examination:  Vital signs: Ht 1.499 m (4' 11\")   Wt 59 kg (130 lb)   BMI 26.26 kg/m²     Musculoskeletal: No abnormal movements.     Mental Status Evaluation:   General: Young female, dressed in casual attire, good grooming and hygiene, in no apparent distress, calm and cooperative, fair eye contact, psychomotor retardation  Orientation: Alert and oriented to person, place and time  Recent and remote memory: Grossly intact  Attention span and concentration: Grossly intact  Speech: Spontaneous, normal rate, rhythm and tone  Thought Process: Linear, logical and goal directed  Thought Content: Denies suicidal or homicidal ideations, intent or plan  Perception: Denies auditory or visual hallucinations. No delusions noted  Associations: Intact  Language: Appropriate  Fund of knowledge and vocabulary: Grossly adequate  Mood: \"alright\"  Affect: Dysphoric, mood congruent  Insight: Good  Judgment: Good    Depression screening:  Depression Screen (PHQ-2/PHQ-9) 7/1/2019 2/24/2020 10/19/2020   PHQ-2 Total Score - - -   PHQ-2 Total Score - - 0   PHQ-2 Total Score 3 0 -   PHQ-9 Total Score - - -   PHQ-9 Total Score - - 0   PHQ-9 Total Score 12 - -     Interpretation of PHQ-9 Total Score "   Score Severity   1-4 No Depression   5-9 Mild Depression   10-14 Moderate Depression   15-19 Moderately Severe Depression   20-27 Severe Depression    Medical Records/Labs/Diagnostic Tests Reviewed:  [place]  records - no prescribed controlled medications found in the last 1 year      Impression:  1.  Major depressive disorder, recurrent, mild - improving  2.  Anxiety disorder, unspecified type - stable  3.  Opioid use disorder, moderate, in sustained remission (last used heroin 7/2019 and Oxycodone 2/2019) - stable    Plan:  1.  Continue Zoloft 50 mg daily for depression and anxiety  2.  Continue to maintain sobriety from opioids and other illicit drugs  3.  Continue individual psychotherapy with CECI Hernandez     Return to clinic in 2 months or sooner if symptoms worsen    The proposed treatment plan was discussed with the patient who was provided the opportunity to ask questions and make suggestions regarding alternative treatment. Patient verbalized understanding and expressed agreement with the plan.     Kira Onofre M.D.  10/19/20    This note was created using voice recognition software (Dragon). The accuracy of the dictation is limited by the abilities of the software. I have reviewed the note prior to signing, however some errors in grammar and context are still possible. If you have any questions related to this note please do not hesitate to contact our office.

## 2020-11-04 ENCOUNTER — PATIENT MESSAGE (OUTPATIENT)
Dept: MEDICAL GROUP | Facility: PHYSICIAN GROUP | Age: 25
End: 2020-11-04

## 2020-11-04 NOTE — TELEPHONE ENCOUNTER
From: Lucy Al  To: Zoraida Chavez M.D.  Sent: 11/4/2020 11:49 AM PST  Subject: Prescription Question    Hi . I ran out of my zoloft medication. Can I get a refill for the 50 mg zoloft?

## 2020-12-14 ENCOUNTER — TELEMEDICINE (OUTPATIENT)
Dept: BEHAVIORAL HEALTH | Facility: CLINIC | Age: 25
End: 2020-12-14
Payer: COMMERCIAL

## 2020-12-14 VITALS — WEIGHT: 125 LBS | BODY MASS INDEX: 25.2 KG/M2 | HEIGHT: 59 IN

## 2020-12-14 DIAGNOSIS — F41.9 ANXIETY DISORDER, UNSPECIFIED TYPE: ICD-10-CM

## 2020-12-14 DIAGNOSIS — F11.21 OPIOID USE DISORDER, MODERATE, IN SUSTAINED REMISSION (HCC): ICD-10-CM

## 2020-12-14 DIAGNOSIS — F33.0 MDD (MAJOR DEPRESSIVE DISORDER), RECURRENT EPISODE, MILD (HCC): ICD-10-CM

## 2020-12-14 PROCEDURE — 90833 PSYTX W PT W E/M 30 MIN: CPT | Mod: 95,CR | Performed by: PSYCHIATRY & NEUROLOGY

## 2020-12-14 PROCEDURE — 99214 OFFICE O/P EST MOD 30 MIN: CPT | Mod: 95,CR | Performed by: PSYCHIATRY & NEUROLOGY

## 2020-12-14 NOTE — PROGRESS NOTES
PSYCHIATRY VIRTUAL VISIT FOLLOW-UP NOTE      Chief Complaint   Patient presents with   • Follow-Up     depression, anxiety       This evaluation was conducted via Zoom using secure and encrypted videoconferencing technology. The patient was in a private location in the [facility].    The patient's identity was confirmed and verbal consent was obtained for this virtual visit.    History Of Present Illness:  Lucy Al is a 25 y.o. old female with major depressive disorder, anxiety disorder, opioid use disorder comes in today for follow up, was last seen 2 months ago.  She has been doing all right in regards to her depression medicine exam anticipatory anxiety lately.  She has signed up to take prerequisites at [employer] next semester and is feeling anxious about it.  She has been thinking about this for quite a while and wants to go ahead with it.  She wants to eventually do something with radiology but she is not sure what.  She is going back to school part-time and has adjusted her work schedule so that she want to do both in the same day.  She has been compliant with Zoloft and denies any side effects.  She is having minimal contact with her ex-boyfriend and it is becoming easier for her to ignore him even if he tries to contact her.  She feels that it has been becoming easier for her to move forward and does not dwell on the relationship much.  She is doing good in regards to her relationship with her parents and is looking forward to [place].  She is doing good in regards to her sleep and appetite.  She denies having thoughts of wanting to hurt herself or others.    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     Substance Use:  Alcohol - She reports drinking 2 beers last weekend after having  "a bad day at work and there was only alcohol and she had since her last appointment with me.  Nicotine - Denies   Cannabis - Smokes cannabis daily, has cut back a little bit  Illicit drugs - Denies     Past Medication Trials:  Prozac at age 16 (\"never gave it a chance\"), Wellbutrin at age 16 (\"never gave it a chance\")    Medications:  Current Outpatient Medications   Medication Sig Dispense Refill   • sertraline (ZOLOFT) 50 MG Tab Take 1 Tab by mouth every day. 90 Tab 0   • norgestimate-ethinyl estradiol (ORTHO-CYCLEN) 0.25-35 MG-MCG per tablet Take 1 Tab by mouth every day. 84 Tab 3   • vitamin D (CHOLECALCIFEROL) 1000 UNIT Tab Take 1,000 Units by mouth every day.       No current facility-administered medications for this visit.        Review Of Systems:    Constitutional - Negative for fatigue  Respiratory - Negative for shortness of breath, cough  CVS - Negative for chest pain, palpitations  GI - Negative for nausea, vomiting, abdominal pain, diarrhea, constipation  Musculoskeletal - Negative for back pain  Neurological - Negative for headaches  Psychiatric - Positive for occasional depression.  Negative for anxiety, sleep problems    Physical Examination:  Vital signs: Ht 1.499 m (4' 11\")   Wt 56.7 kg (125 lb)   BMI 25.25 kg/m²     Musculoskeletal: No abnormal movements.     Mental Status Evaluation:   General: Young female, dressed in casual attire, good grooming and hygiene, in no apparent distress, calm and cooperative, fair eye contact, psychomotor retardation  Orientation: Alert and oriented to person, place and time  Recent and remote memory: Grossly intact  Attention span and concentration: Grossly intact  Speech: Spontaneous, normal rate, rhythm and tone  Thought Process: Linear, logical and goal directed  Thought Content: Denies suicidal or homicidal ideations, intent or plan  Perception: Denies auditory or visual hallucinations. No delusions noted  Associations: Intact  Language: Appropriate  Fund " "of knowledge and vocabulary: Grossly adequate  Mood: \"just okay\"  Affect: Euthymic, mood congruent  Insight: Good  Judgment: Good    Depression screening:  Depression Screen (PHQ-2/PHQ-9) 7/1/2019 2/24/2020 10/19/2020   PHQ-2 Total Score - - -   PHQ-2 Total Score - - 0   PHQ-2 Total Score 3 0 -   PHQ-9 Total Score - - -   PHQ-9 Total Score - - 0   PHQ-9 Total Score 12 - -     Interpretation of PHQ-9 Total Score   Score Severity   1-4 No Depression   5-9 Mild Depression   10-14 Moderate Depression   15-19 Moderately Severe Depression   20-27 Severe Depression    Medical Records/Labs/Diagnostic Tests Reviewed:  [place]  records - no prescribed controlled medications found in the last 1 year      Impression:  1.  Major depressive disorder, recurrent, mild - stable  2.  Anxiety disorder, unspecified type - stable  3.  Opioid use disorder, moderate, in sustained remission (last used heroin 7/2019 and Oxycodone 2/2019) - stable    Plan:  1.  Continue Zoloft 50 mg daily for depression and anxiety  2.  Continue to maintain sobriety from opioids and other illicit drugs  3.  Continue individual psychotherapy with CECI Hernandez   4.  Provided supportive psychotherapy (> 16 minutes).  Provided emotional validation on how her feelings have changed in regards to her last relationship and encouraged to continue with the boundaries.  Encouraged self care especially next month when she goes back to school after a while and will be working as well.    Return to clinic in 2 months or sooner if symptoms worsen    The proposed treatment plan was discussed with the patient who was provided the opportunity to ask questions and make suggestions regarding alternative treatment. Patient verbalized understanding and expressed agreement with the plan.     Kira Onofre M.D.  12/14/20    This note was created using voice recognition software (Dragon). The accuracy of the dictation is limited by the abilities of the software. I have " reviewed the note prior to signing, however some errors in grammar and context are still possible. If you have any questions related to this note please do not hesitate to contact our office.

## 2020-12-16 DIAGNOSIS — Z23 NEED FOR VACCINATION: ICD-10-CM

## 2020-12-20 ENCOUNTER — IMMUNIZATION (OUTPATIENT)
Dept: FAMILY PLANNING/WOMEN'S HEALTH CLINIC | Facility: IMMUNIZATION CENTER | Age: 25
End: 2020-12-20
Payer: COMMERCIAL

## 2020-12-20 ENCOUNTER — APPOINTMENT (OUTPATIENT)
Dept: FAMILY PLANNING/WOMEN'S HEALTH CLINIC | Facility: IMMUNIZATION CENTER | Age: 25
End: 2020-12-20
Attending: FAMILY MEDICINE
Payer: COMMERCIAL

## 2020-12-20 DIAGNOSIS — Z23 NEED FOR VACCINATION: ICD-10-CM

## 2020-12-20 DIAGNOSIS — Z23 ENCOUNTER FOR VACCINATION: Primary | ICD-10-CM

## 2020-12-20 PROCEDURE — 91300 PFIZER SARS-COV-2 VACCINE: CPT

## 2020-12-20 PROCEDURE — 0001A PFIZER SARS-COV-2 VACCINE: CPT

## 2021-01-10 ENCOUNTER — IMMUNIZATION (OUTPATIENT)
Dept: FAMILY PLANNING/WOMEN'S HEALTH CLINIC | Facility: IMMUNIZATION CENTER | Age: 26
End: 2021-01-10
Attending: FAMILY MEDICINE
Payer: COMMERCIAL

## 2021-01-10 DIAGNOSIS — Z23 ENCOUNTER FOR VACCINATION: Primary | ICD-10-CM

## 2021-01-10 PROCEDURE — 91300 PFIZER SARS-COV-2 VACCINE: CPT

## 2021-01-10 PROCEDURE — 0002A PFIZER SARS-COV-2 VACCINE: CPT

## 2021-02-11 ENCOUNTER — APPOINTMENT (OUTPATIENT)
Dept: BEHAVIORAL HEALTH | Facility: CLINIC | Age: 26
End: 2021-02-11
Payer: COMMERCIAL

## 2021-02-24 ENCOUNTER — OFFICE VISIT (OUTPATIENT)
Dept: MEDICAL GROUP | Facility: PHYSICIAN GROUP | Age: 26
End: 2021-02-24
Payer: COMMERCIAL

## 2021-02-24 VITALS
WEIGHT: 129 LBS | SYSTOLIC BLOOD PRESSURE: 100 MMHG | HEART RATE: 82 BPM | TEMPERATURE: 97.6 F | OXYGEN SATURATION: 97 % | BODY MASS INDEX: 26 KG/M2 | RESPIRATION RATE: 16 BRPM | HEIGHT: 59 IN | DIASTOLIC BLOOD PRESSURE: 58 MMHG

## 2021-02-24 DIAGNOSIS — Z00.00 ENCOUNTER FOR PREVENTATIVE ADULT HEALTH CARE EXAMINATION: ICD-10-CM

## 2021-02-24 DIAGNOSIS — Z11.3 SCREEN FOR STD (SEXUALLY TRANSMITTED DISEASE): ICD-10-CM

## 2021-02-24 DIAGNOSIS — R00.2 PALPITATIONS: ICD-10-CM

## 2021-02-24 PROCEDURE — 99385 PREV VISIT NEW AGE 18-39: CPT | Performed by: FAMILY MEDICINE

## 2021-02-24 NOTE — PROGRESS NOTES
Subjective:     CC:   Chief Complaint   Patient presents with   • Annual Exam     preventative care exam, STD testing     HPI:   Lucy Al is a 25 y.o. female who presents for annual exam.  She also has questions about testing for herpes as her partner was recently diagnosed with genital herpes.  She denies any oral or genital sores.  She tells me that she did perform oral intercourse on him prior to his diagnosis.  She states he did not have any sores at that time.    Patient has GYN provider: No   Last Pap Smear:    H/O Abnormal Pap: No  Last Mammogram: N/A  Last Bone Density Test: N/A  Last Colorectal Cancer Screening: N/A  Last Tdap:   Received HPV series: Yes    Exercise: moderate regular exercise, aerobic < 3 days a week  Diet: healthy      Birth control: OCP's  Menses every month with 4-5 days with light, moderate bleeding.  Denies cramping.  No significant bloating/fluid retention, pelvic pain, or dyspareunia. No abnormal vaginal discharge.  No breast tenderness, mass, nipple discharge, changes in size or contour, or abnormal cyclic discomfort.    OB History    Para Term  AB Living   0 0 0 0 0 0   SAB TAB Ectopic Molar Multiple Live Births   0 0 0 0 0 0      She  reports previously being sexually active and has had partner(s) who are Male.    She  has a past medical history of Anxiety, Depression, Pericarditis, and Substance abuse (HCC).  She  has no past surgical history on file.    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 90 Tab 0   • norgestimate-ethinyl estradiol (ORTHO-CYCLEN) 0.25-35 MG-MCG per tablet Take 1 Tab by mouth every day. 84 Tab 3   • vitamin D (CHOLECALCIFEROL) 1000 UNIT Tab Take 1,000 Units by mouth every day.       No current facility-administered medications for this visit.     No Known Allergies    Review of Systems   Constitutional: Negative for fever, chills and malaise/fatigue.   HENT: Negative for congestion.    Eyes: Negative for pain.   Respiratory: Negative for cough and shortness of breath.    Cardiovascular: Negative for chest pain and leg swelling.   Gastrointestinal: Negative for nausea, vomiting, abdominal pain and diarrhea.   Genitourinary: Negative for dysuria and hematuria.   Skin: Negative for rash.   Neurological: Negative for dizziness, focal weakness and headaches.   Endo/Heme/Allergies: Does not bruise/bleed easily.   Psychiatric/Behavioral: Negative for suicidal or homicidal thoughts.      Objective:   /58 (BP Location: Left arm, Patient Position: Sitting, BP Cuff Size: Adult)   Pulse 82   Temp 36.4 °C (97.6 °F) (Temporal)   Resp 16   Ht 1.499 m (4' 11\")   Wt 58.5 kg (129 lb)   SpO2 97%   BMI 26.05 kg/m²     Wt Readings from Last 4 Encounters:   02/24/21 58.5 kg (129 lb)   12/14/20 56.7 kg (125 lb)   10/19/20 59 kg (130 lb)   10/07/20 59.4 kg (131 lb)     Physical Exam:  Constitutional: Well-developed and well-nourished. Not diaphoretic. No distress.   Skin: " Skin is warm and dry. No rash noted.  Head: Atraumatic without lesions.  Eyes: Conjunctivae and extraocular motions are normal. No scleral icterus.   Ears:  External ears unremarkable.   Mouth/Throat: +Mask.  Neck: Supple, trachea midline. Normal range of motion. No thyromegaly present. No lymphadenopathy--cervical or supraclavicular.  Cardiovascular: Regular rate and rhythm, S1 and S2 without murmur, rubs, or gallops.    Respiratory: Effort normal. Clear to auscultation throughout. No adventitious sounds.   Abdomen: Soft, non tender, and without distention. Active bowel sounds in all four quadrants. No rebound, guarding, masses or HSM.  Extremities: No cyanosis, clubbing, erythema, nor edema. Distal pulses intact and symmetric.   Musculoskeletal: All major joints AROM full in all directions without pain.   Psychiatric:  Behavior, mood, and affect are appropriate.    Assessment and Plan:     1. Encounter for preventative adult health care examination  This is a healthy 25-year-old female here today for a preventative exam.  Previous medical history, healthcare maintenance and immunization status reviewed.  Patient is up to date.  See discussion of anticipatory guidance below.  Patient will return annually for preventative exams.    2. Screen for STD (sexually transmitted disease)  We spent a great deal of time today discussing her partner's recent diagnosis of genital herpes.  From the encounter patient describes, it sounds as though risk of transmission is very low.  She is completely asymptomatic and has not had any oral or genital lesions.  I advised her that serologic testing would not be indicated and could result in a false positive.  I encouraged her to contact our office right away if she notices any sores and we would make every attempt to squeeze her in so we could obtain a viral culture which is a more accurate test.    3. Palpitations  Towards the end of the visit, patient mentioned that she had had some  palpitations over the last couple of weeks.  This was around the time that her partner informed her of his diagnosis of genital herpes.  She admits to being stressed about this.  Denies any red flag symptoms such as chest pain, shortness of breath or syncopal episodes.  We made arrangements to follow-up in the next 3 to 4 weeks and check in on this.  If she does continue to have heart palpitations, would most likely start with EKG and blood work.    Health maintenance:  UTD   Labs per orders  Immunizations per orders  Patient counseled about skin care, diet, supplements, and exercise.  Discussed  STD prevention     Follow-up: Return in about 4 weeks (around 3/24/2021) for f/u palpitations.

## 2021-03-11 ENCOUNTER — TELEMEDICINE (OUTPATIENT)
Dept: BEHAVIORAL HEALTH | Facility: CLINIC | Age: 26
End: 2021-03-11
Payer: COMMERCIAL

## 2021-03-11 VITALS — HEIGHT: 59 IN | BODY MASS INDEX: 26 KG/M2 | WEIGHT: 129 LBS

## 2021-03-11 DIAGNOSIS — F11.21 OPIOID USE DISORDER, MODERATE, IN SUSTAINED REMISSION (HCC): ICD-10-CM

## 2021-03-11 DIAGNOSIS — F33.41 MDD (MAJOR DEPRESSIVE DISORDER), RECURRENT, IN PARTIAL REMISSION (HCC): ICD-10-CM

## 2021-03-11 DIAGNOSIS — F41.9 ANXIETY DISORDER, UNSPECIFIED TYPE: ICD-10-CM

## 2021-03-11 PROCEDURE — 90833 PSYTX W PT W E/M 30 MIN: CPT | Mod: 95,CR | Performed by: PSYCHIATRY & NEUROLOGY

## 2021-03-11 PROCEDURE — 99214 OFFICE O/P EST MOD 30 MIN: CPT | Mod: 95,CR | Performed by: PSYCHIATRY & NEUROLOGY

## 2021-03-11 NOTE — PROGRESS NOTES
"PSYCHIATRY VIRTUAL VISIT FOLLOW-UP NOTE      Chief Complaint   Patient presents with   • Follow-Up     depression, anxiety       This evaluation was conducted via Zoom using secure and encrypted videoconferencing technology. The patient was in a private location in the [facility].    The patient's identity was confirmed and verbal consent was obtained for this virtual visit.    History Of Present Illness:  Lucy Al is a 25 y.o. old female with major depressive disorder, anxiety disorder, opioid use disorder comes in today for follow up, was last seen about 3 months ago.  She is doing all right in regards to her anxiety and depression since her last appointment with me.  She started taking classes at [employer] but felt overwhelmed and decided to drop out.  She received her wrist at work and is working full-time and that has been better financial decision for her.  She mentions that she has been seeing somebody for the last 1 year and is thinking about getting a place together.  She has been in a few unhealthy relationships in the past and so far is finding this relationship to be healthy.  She is on and off staying in touch with her ex-boyfriend and his maintaining her boundaries.  She continues to see a therapist at least once or twice a month.  She has been compliant with Zoloft and is doing all right.  She denies having thoughts of wanting to hurt herself or others.    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     Substance Use:  Alcohol - Denies recent alcohol use.  Nicotine - Denies   Cannabis - Smokes cannabis daily but has lowered the amount  Illicit drugs - Denies     Past Medication Trials:  Prozac at age 16 (\"never gave it a chance\"), Wellbutrin at age 16 (\"never gave it a " "chance\")    Medications:  Current Outpatient Medications   Medication Sig Dispense Refill   • sertraline (ZOLOFT) 50 MG Tab Take 1 tablet by mouth every day. 90 tablet 0   • norgestimate-ethinyl estradiol (ORTHO-CYCLEN) 0.25-35 MG-MCG per tablet Take 1 Tab by mouth every day. 84 Tab 3   • vitamin D (CHOLECALCIFEROL) 1000 UNIT Tab Take 1,000 Units by mouth every day.       No current facility-administered medications for this visit.       Review Of Systems:    Constitutional - Negative for fatigue  Psychiatric - Positive for occasional depression.  Negative for anxiety, sleep problems    Physical Examination:  Vital signs: Ht 1.499 m (4' 11\")   Wt 58.5 kg (129 lb)   Breastfeeding No   BMI 26.05 kg/m²     Musculoskeletal: No abnormal movements.     Mental Status Evaluation:   General: Young female, dressed in casual attire, good grooming and hygiene, in no apparent distress, calm and cooperative, fair eye contact, psychomotor retardation  Orientation: Alert and oriented to person, place and time  Recent and remote memory: Grossly intact  Attention span and concentration: Grossly intact  Speech: Spontaneous, normal rate, rhythm and tone  Thought Process: Linear, logical and goal directed  Thought Content: Denies suicidal or homicidal ideations, intent or plan  Perception: Denies auditory or visual hallucinations. No delusions noted  Associations: Intact  Language: Appropriate  Fund of knowledge and vocabulary: Grossly adequate  Mood: \"just okay\"  Affect: Euthymic, mood congruent  Insight: Good  Judgment: Good    Depression screening:  Depression Screen (PHQ-2/PHQ-9) 7/1/2019 2/24/2020 10/19/2020   PHQ-2 Total Score - - -   PHQ-2 Total Score - - 0   PHQ-2 Total Score 3 0 -   PHQ-9 Total Score - - -   PHQ-9 Total Score - - 0   PHQ-9 Total Score 12 - -     Interpretation of PHQ-9 Total Score   Score Severity   1-4 No Depression   5-9 Mild Depression   10-14 Moderate Depression   15-19 Moderately Severe Depression "   20-27 Severe Depression    Medical Records/Labs/Diagnostic Tests Reviewed:  [place]  records - no prescribed controlled medications found in the last 1 year      Impression:  1.  Major depressive disorder, recurrent, in partial remission- stable  2.  Anxiety disorder, unspecified type - stable  3.  Opioid use disorder, moderate, in sustained remission (last used heroin 7/2019 and Oxycodone 2/2019) - stable    Plan:  1.  Continue Zoloft 50 mg daily for depression and anxiety  2.  Continue to maintain sobriety from opioids and other illicit drugs  3.  Continue individual psychotherapy with CECI Hernandez   4.  Provided supportive psychotherapy (> 16 minutes) regards to anxiety and depression.  Discussed her new relationship and why it is little different for her given her history of being in unhealthy relationships.  Encouraged to keep on maintaining relationship boundaries with her ex-boyfriend and discussed how that relationship is not healthy for her.    Return to clinic in 3 months or sooner if symptoms worsen    The proposed treatment plan was discussed with the patient who was provided the opportunity to ask questions and make suggestions regarding alternative treatment. Patient verbalized understanding and expressed agreement with the plan.     Kira Onofre M.D.  03/11/21    This note was created using voice recognition software (Dragon). The accuracy of the dictation is limited by the abilities of the software. I have reviewed the note prior to signing, however some errors in grammar and context are still possible. If you have any questions related to this note please do not hesitate to contact our office.

## 2021-03-14 ENCOUNTER — EH NON-PROVIDER (OUTPATIENT)
Dept: OCCUPATIONAL MEDICINE | Facility: CLINIC | Age: 26
End: 2021-03-14

## 2021-03-14 DIAGNOSIS — Z02.89 ENCOUNTER FOR OCCUPATIONAL HEALTH EXAMINATION INVOLVING RESPIRATOR: ICD-10-CM

## 2021-03-14 PROCEDURE — 94375 RESPIRATORY FLOW VOLUME LOOP: CPT | Performed by: PHYSICIAN ASSISTANT

## 2021-03-22 ENCOUNTER — APPOINTMENT (OUTPATIENT)
Dept: MEDICAL GROUP | Facility: PHYSICIAN GROUP | Age: 26
End: 2021-03-22
Payer: COMMERCIAL

## 2021-04-12 PROCEDURE — RXMED WILLOW AMBULATORY MEDICATION CHARGE: Performed by: PSYCHIATRY & NEUROLOGY

## 2021-04-22 ENCOUNTER — PHARMACY VISIT (OUTPATIENT)
Dept: PHARMACY | Facility: MEDICAL CENTER | Age: 26
End: 2021-04-22
Payer: COMMERCIAL

## 2021-04-26 ENCOUNTER — HOSPITAL ENCOUNTER (EMERGENCY)
Facility: MEDICAL CENTER | Age: 26
End: 2021-04-26
Attending: EMERGENCY MEDICINE
Payer: COMMERCIAL

## 2021-04-26 VITALS
WEIGHT: 130 LBS | SYSTOLIC BLOOD PRESSURE: 105 MMHG | TEMPERATURE: 97 F | OXYGEN SATURATION: 98 % | RESPIRATION RATE: 18 BRPM | HEIGHT: 59 IN | BODY MASS INDEX: 26.21 KG/M2 | DIASTOLIC BLOOD PRESSURE: 57 MMHG | HEART RATE: 85 BPM

## 2021-04-26 DIAGNOSIS — T50.901A ACCIDENTAL DRUG OVERDOSE, INITIAL ENCOUNTER: ICD-10-CM

## 2021-04-26 LAB
ANION GAP SERPL CALC-SCNC: 12 MMOL/L (ref 7–16)
BASOPHILS # BLD AUTO: 0.3 % (ref 0–1.8)
BASOPHILS # BLD: 0.05 K/UL (ref 0–0.12)
BUN SERPL-MCNC: 9 MG/DL (ref 8–22)
CALCIUM SERPL-MCNC: 8.3 MG/DL (ref 8.5–10.5)
CHLORIDE SERPL-SCNC: 103 MMOL/L (ref 96–112)
CO2 SERPL-SCNC: 22 MMOL/L (ref 20–33)
CREAT SERPL-MCNC: 0.56 MG/DL (ref 0.5–1.4)
EOSINOPHIL # BLD AUTO: 0.08 K/UL (ref 0–0.51)
EOSINOPHIL NFR BLD: 0.5 % (ref 0–6.9)
ERYTHROCYTE [DISTWIDTH] IN BLOOD BY AUTOMATED COUNT: 41.1 FL (ref 35.9–50)
ETHANOL BLD-MCNC: <10.1 MG/DL (ref 0–10)
GLUCOSE SERPL-MCNC: 172 MG/DL (ref 65–99)
HCG SERPL QL: NEGATIVE
HCT VFR BLD AUTO: 44.4 % (ref 37–47)
HGB BLD-MCNC: 13.4 G/DL (ref 12–16)
IMM GRANULOCYTES # BLD AUTO: 0.1 K/UL (ref 0–0.11)
IMM GRANULOCYTES NFR BLD AUTO: 0.6 % (ref 0–0.9)
LYMPHOCYTES # BLD AUTO: 4 K/UL (ref 1–4.8)
LYMPHOCYTES NFR BLD: 24.1 % (ref 22–41)
MCH RBC QN AUTO: 24.2 PG (ref 27–33)
MCHC RBC AUTO-ENTMCNC: 30.2 G/DL (ref 33.6–35)
MCV RBC AUTO: 80.3 FL (ref 81.4–97.8)
MONOCYTES # BLD AUTO: 1.19 K/UL (ref 0–0.85)
MONOCYTES NFR BLD AUTO: 7.2 % (ref 0–13.4)
NEUTROPHILS # BLD AUTO: 11.15 K/UL (ref 2–7.15)
NEUTROPHILS NFR BLD: 67.3 % (ref 44–72)
NRBC # BLD AUTO: 0 K/UL
NRBC BLD-RTO: 0 /100 WBC
PLATELET # BLD AUTO: 239 K/UL (ref 164–446)
PMV BLD AUTO: 11 FL (ref 9–12.9)
POTASSIUM SERPL-SCNC: 3.5 MMOL/L (ref 3.6–5.5)
RBC # BLD AUTO: 5.53 M/UL (ref 4.2–5.4)
SODIUM SERPL-SCNC: 137 MMOL/L (ref 135–145)
WBC # BLD AUTO: 16.6 K/UL (ref 4.8–10.8)

## 2021-04-26 PROCEDURE — 36415 COLL VENOUS BLD VENIPUNCTURE: CPT

## 2021-04-26 PROCEDURE — 82077 ASSAY SPEC XCP UR&BREATH IA: CPT

## 2021-04-26 PROCEDURE — 80048 BASIC METABOLIC PNL TOTAL CA: CPT

## 2021-04-26 PROCEDURE — 85025 COMPLETE CBC W/AUTO DIFF WBC: CPT

## 2021-04-26 PROCEDURE — 99284 EMERGENCY DEPT VISIT MOD MDM: CPT

## 2021-04-26 PROCEDURE — 84703 CHORIONIC GONADOTROPIN ASSAY: CPT

## 2021-04-26 ASSESSMENT — LIFESTYLE VARIABLES: DO YOU DRINK ALCOHOL: NO

## 2021-04-27 NOTE — ED PROVIDER NOTES
ED Provider Note    CHIEF COMPLAINT  Chief Complaint   Patient presents with   • Drug Overdose     smoked 3 hits of oxycodone pill - unknown dose, witnessed by friend.        HPI  Lucy Al is a 25 y.o. female who presents to the emergency department with reported accidental overdose. Long-standing history of opiate abuse. She notes that over the last two years she has been more or less clean after a few month binge on heroin. She notes that she will use approximate once every three months over this last two years. Today was going to smoke a 30 mg oxycodone with a friend and she then became altered and EMS was summoned. EMS gave Narcan that she was brought here. Currently feeling regretful and embarrassed but denies any other acute complaint. Denies any intent to harm self. Denies any other coingestants.    REVIEW OF SYSTEMS  See HPI for further details. All other systems are negative.     PAST MEDICAL HISTORY   has a past medical history of Anxiety, Depression, Pericarditis, and Substance abuse (HCC).    SOCIAL HISTORY  Social History     Tobacco Use   • Smoking status: Former Smoker   • Smokeless tobacco: Never Used   Substance and Sexual Activity   • Alcohol use: Not Currently     Alcohol/week: 0.0 oz   • Drug use: Yes     Types: Marijuana, Inhaled     Comment: marijuana   • Sexual activity: Not Currently     Partners: Male       SURGICAL HISTORY  patient denies any surgical history    CURRENT MEDICATIONS  Home Medications       Reviewed by Dori Garcia R.N. (Registered Nurse) on 04/26/21 at 1716  Med List Status: Complete     Medication Last Dose Status   norgestimate-ethinyl estradiol (ORTHO-CYCLEN) 0.25-35 MG-MCG per tablet not taking Active   sertraline (ZOLOFT) 50 MG Tab 4/25/2021 Active   vitamin D (CHOLECALCIFEROL) 1000 UNIT Tab not taking Active                    ALLERGIES  No Known Allergies    PHYSICAL EXAM  VITAL SIGNS: /57   Pulse 85   Temp 36.1 °C (97 °F) (Temporal)   Resp 18   " Ht 1.499 m (4' 11\")   Wt 59 kg (130 lb)   LMP 03/07/2021 (Approximate)   SpO2 98%   Breastfeeding No   BMI 26.26 kg/m²  @[email or web address][[id number]::@   Pulse ox interpretation: I interpret this pulse ox as normal.  Constitutional: Alert in no apparent distress.  HENT: No signs of trauma, Bilateral external ears normal, Nose normal.   Eyes: Pupils are equal and reactive  Neck: Normal range of motion, No tenderness, Supple, No stridor.   Cardiovascular: Regular rate and rhythm, no murmurs.   Thorax & Lungs: Normal breath sounds, No respiratory distress, No wheezing, No chest tenderness.   Abdomen: Bowel sounds normal, Soft, No tenderness  Skin: Warm, Dry, No erythema, No rash.   Extremities: Intact distal pulses, No edema, No tenderness  Musculoskeletal: Good range of motion in all major joints. No tenderness to palpation or major deformities noted.   Neurologic: Alert , Normal motor function, Normal sensory function, No focal deficits noted.   Psychiatric: Affect normal, Judgment normal. Poor eye contact. Denies SI or HI.      DIAGNOSTIC STUDIES / PROCEDURES      LABS  Results for orders placed or performed during the hospital encounter of 04/26/21   HCG Qual Serum   Result Value Ref Range    Beta-Hcg Qualitative Serum Negative Negative   CBC WITH DIFFERENTIAL   Result Value Ref Range    WBC 16.6 (H) 4.8 - 10.8 K/uL    RBC 5.53 (H) 4.20 - 5.40 M/uL    Hemoglobin 13.4 12.0 - 16.0 g/dL    Hematocrit 44.4 37.0 - 47.0 %    MCV 80.3 (L) 81.4 - 97.8 fL    MCH 24.2 (L) 27.0 - 33.0 pg    MCHC 30.2 (L) 33.6 - 35.0 g/dL    RDW 41.1 35.9 - 50.0 fL    Platelet Count 239 164 - 446 K/uL    MPV 11.0 9.0 - 12.9 fL    Neutrophils-Polys 67.30 44.00 - 72.00 %    Lymphocytes 24.10 22.00 - 41.00 %    Monocytes 7.20 0.00 - 13.40 %    Eosinophils 0.50 0.00 - 6.90 %    Basophils 0.30 0.00 - 1.80 %    Immature Granulocytes 0.60 0.00 - 0.90 %    Nucleated RBC 0.00 /100 WBC    Neutrophils (Absolute) 11.15 (H) 2.00 - 7.15 K/uL    Lymphs (Absolute) 4.00 " 1.00 - 4.80 K/uL    Monos (Absolute) 1.19 (H) 0.00 - 0.85 K/uL    Eos (Absolute) 0.08 0.00 - 0.51 K/uL    Baso (Absolute) 0.05 0.00 - 0.12 K/uL    Immature Granulocytes (abs) 0.10 0.00 - 0.11 K/uL    NRBC (Absolute) 0.00 K/uL   BASIC METABOLIC PANEL   Result Value Ref Range    Sodium 137 135 - 145 mmol/L    Potassium 3.5 (L) 3.6 - 5.5 mmol/L    Chloride 103 96 - 112 mmol/L    Co2 22 20 - 33 mmol/L    Glucose 172 (H) 65 - 99 mg/dL    Bun 9 8 - 22 mg/dL    Creatinine 0.56 0.50 - 1.40 mg/dL    Calcium 8.3 (L) 8.5 - 10.5 mg/dL    Anion Gap 12.0 7.0 - 16.0   DIAGNOSTIC ALCOHOL   Result Value Ref Range    Diagnostic Alcohol <10.1 0.0 - 10.0 mg/dL   ESTIMATED GFR   Result Value Ref Range    GFR If African American >60 >60 mL/min/1.73 m 2    GFR If Non African American >60 >60 mL/min/1.73 m 2       RADIOLOGY  No orders to display           COURSE & MEDICAL DECISION MAKING  Pertinent Labs & Imaging studies reviewed. (See chart for details)  patient presented to the emergency department after accidental overdose on opiate. History as above. Patient with long-standing history of opiate abuse. Patient did receive Narcan. After prolonged observation patient remains alert and oriented with no respiratory distress or cognitive depression. I have offered multiple times for further counseling and referrals however she declines this. Mother at bedside also exposed to this conversation. This point will discharge the patient with her mother with return precautions understood.     The patient will not drink alcohol nor drive with prescription including narcotic medications. The patient will return for worsening symptoms and is stable at the time of discharge. The patient verbalizes understanding and will comply.  FINAL IMPRESSION  1. Accidental drug overdose, initial encounter            Electronically signed by: Fadi Davis M.D., 4/26/2021 6:21 PM

## 2021-04-27 NOTE — ED TRIAGE NOTES
"Chief Complaint   Patient presents with   • Drug Overdose     smoked 3 hits of oxycodone pill - unknown dose, witnessed by friend.      Patient bib EMS from home, PTA PIV placed, FSBG 209; friend reportedly called EMS when patient had decreased respiratory rate and sedated.  On arrival EMS reports RR 14, SpO2 on RA 88%.    On arrival, patient A&O, flat affect; Patient denies SI/HI, reports occasionally partakes in smoking oxycodone for \"fun\".    Patient changed into gown, chart up for ERP.       "

## 2021-04-27 NOTE — ED NOTES
PIV removed and bandaged.  Lucy Al discharged via ambulation with self to family driving home from [facility].  Discharge instructions given and reviewed, patient educated to follow up with PCP, verbalized understanding.  All personal belongings in possession.  No questions at this time.

## 2021-07-20 ENCOUNTER — TELEMEDICINE (OUTPATIENT)
Dept: BEHAVIORAL HEALTH | Facility: CLINIC | Age: 26
End: 2021-07-20
Payer: COMMERCIAL

## 2021-07-20 VITALS — BODY MASS INDEX: 26.26 KG/M2 | HEIGHT: 59 IN

## 2021-07-20 DIAGNOSIS — F33.41 MDD (MAJOR DEPRESSIVE DISORDER), RECURRENT, IN PARTIAL REMISSION (HCC): ICD-10-CM

## 2021-07-20 DIAGNOSIS — F11.21 OPIOID USE DISORDER, MODERATE, IN EARLY REMISSION (HCC): ICD-10-CM

## 2021-07-20 DIAGNOSIS — F41.9 ANXIETY DISORDER, UNSPECIFIED TYPE: ICD-10-CM

## 2021-07-20 PROCEDURE — 90833 PSYTX W PT W E/M 30 MIN: CPT | Mod: 95 | Performed by: PSYCHIATRY & NEUROLOGY

## 2021-07-20 PROCEDURE — 99214 OFFICE O/P EST MOD 30 MIN: CPT | Mod: 95 | Performed by: PSYCHIATRY & NEUROLOGY

## 2021-07-20 NOTE — PROGRESS NOTES
"PSYCHIATRY VIRTUAL VISIT FOLLOW-UP NOTE      Chief Complaint   Patient presents with   • Follow-Up     anxiety, depression, opioid addiction       This evaluation was conducted via Zoom using secure and encrypted videoconferencing technology. The patient was in a private location in the [facility].    The patient's identity was confirmed and verbal consent was obtained for this virtual visit.    History Of Present Illness:  Lucy Al is a 25 y.o. female with major depressive disorder, anxiety disorder, opioid use disorder comes in today for follow up, was last seen over 4 months ago.  She has been doing all right in regards to her depression and anxiety since her last visit with.  She relapsed on oxycodone in April and was seen in the ER for overdose.  She mentions that she was feeling bored staying with her friend and smoked oxycodone but feels that it was something else.  She denies any intentional heroin use.  She has not done any since her ER visit in April.  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  She is having a tough time with friends.  She is also not in a relationship anymore but is friends with her ex-boyfriend.  She is doing good in regards to her relationship with her parents.  She is taking care of herself adequately.  She denies having thoughts of wanting to hurt herself.    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     Substance Use:  Alcohol - Infrequent alcohol use, mainly in social settings, denies binge alcohol use   Nicotine - Denies   Cannabis - Smokes cannabis on her 4 days off work  Illicit drugs - Denies     Past Medication Trials:  Prozac at age 16 (\"never gave it a chance\"), Wellbutrin at age 16 (\"never gave it a chance\")    Medications:  Current " "Outpatient Medications   Medication Sig Dispense Refill   • sertraline (ZOLOFT) 50 MG Tab Take 1 tablet by mouth every day. 90 tablet 0   • norgestimate-ethinyl estradiol (ORTHO-CYCLEN) 0.25-35 MG-MCG per tablet Take 1 Tab by mouth every day. 84 Tab 3   • vitamin D (CHOLECALCIFEROL) 1000 UNIT Tab Take 1,000 Units by mouth every day.       No current facility-administered medications for this visit.       Review Of Systems:    Constitutional - Negative for fatigue  Psychiatric - Positive for infrequent depression.  Negative for anxiety, sleep problems    Physical Examination:  Vital signs: Ht 1.499 m (4' 11\")   BMI 26.26 kg/m²     Musculoskeletal: No abnormal movements.     Mental Status Evaluation:   General: Young female, dressed in casual attire, good grooming and hygiene, in no apparent distress, calm and cooperative, fair eye contact, psychomotor retardation  Orientation: Alert and oriented to person, place and time  Recent and remote memory: Grossly intact  Attention span and concentration: Grossly intact  Speech: Spontaneous, normal rate, rhythm and tone  Thought Process: Linear, logical and goal directed  Thought Content: Denies suicidal or homicidal ideations, intent or plan  Perception: Denies auditory or visual hallucinations. No delusions noted  Associations: Intact  Language: Appropriate  Fund of knowledge and vocabulary: Grossly adequate  Mood: \"going okay\"  Affect: Euthymic, mood congruent  Insight: Good  Judgment: Good    Depression screening:  Depression Screen (PHQ-2/PHQ-9) 7/1/2019 2/24/2020 10/19/2020   PHQ-2 Total Score - - -   PHQ-2 Total Score - - 0   PHQ-2 Total Score 3 0 -   PHQ-9 Total Score - - -   PHQ-9 Total Score - - 0   PHQ-9 Total Score 12 - -     Interpretation of PHQ-9 Total Score   Score Severity   1-4 No Depression   5-9 Mild Depression   10-14 Moderate Depression   15-19 Moderately Severe Depression   20-27 Severe Depression    Medical Records/Labs/Diagnostic Tests Reviewed:  [place] " PDMP records - no prescribed controlled medications found in the last 2 years      Impression:  1.  Major depressive disorder, recurrent, in partial remission- stable  2.  Anxiety disorder, unspecified type - stable  3.  Opioid use disorder, moderate, in early remission (last used heroin 7/2019 and Oxycodone 4/2021) - slight worsening     Plan:  1.  Continue Zoloft 50 mg daily for depression and anxiety  2.  Continue to maintain sobriety from opioids and other illicit drugs  3.  Continue individual psychotherapy with CECI Hernandez   4.  Provided supportive psychotherapy (> 16 minutes) regards to anxiety and depression.  Provided emotional validation in regards to struggles with a close friend of hers.  Encouraged her to maintain boundaries and putting her sobriety over relationships.    Return to clinic in 3 months or sooner if symptoms worsen    The proposed treatment plan was discussed with the patient who was provided the opportunity to ask questions and make suggestions regarding alternative treatment. Patient verbalized understanding and expressed agreement with the plan.     Kira Onofre M.D.  07/20/21    This note was created using voice recognition software (Dragon). The accuracy of the dictation is limited by the abilities of the software. I have reviewed the note prior to signing, however some errors in grammar and context are still possible. If you have any questions related to this note please do not hesitate to contact our office.

## 2021-08-04 ENCOUNTER — TELEPHONE (OUTPATIENT)
Dept: OCCUPATIONAL MEDICINE | Facility: CLINIC | Age: 26
End: 2021-08-04

## 2021-08-04 NOTE — TELEPHONE ENCOUNTER
Patient called to let us know that her mother was out of town and is now being tested for COVID. She denies any symptoms for both her and her mother. Lucy us currently COVID vaccinated.  At this time no testing is required and she was told to monitor her symptoms.

## 2021-08-06 ENCOUNTER — PHARMACY VISIT (OUTPATIENT)
Dept: PHARMACY | Facility: MEDICAL CENTER | Age: 26
End: 2021-08-06
Payer: COMMERCIAL

## 2021-08-06 PROCEDURE — RXMED WILLOW AMBULATORY MEDICATION CHARGE: Performed by: PSYCHIATRY & NEUROLOGY

## 2021-09-23 ENCOUNTER — IMMUNIZATION (OUTPATIENT)
Dept: OCCUPATIONAL MEDICINE | Facility: CLINIC | Age: 26
End: 2021-09-23
Payer: COMMERCIAL

## 2021-09-23 DIAGNOSIS — Z23 NEED FOR VACCINATION: Primary | ICD-10-CM

## 2021-09-23 PROCEDURE — 90686 IIV4 VACC NO PRSV 0.5 ML IM: CPT | Performed by: PREVENTIVE MEDICINE

## 2021-10-01 ENCOUNTER — IMMUNIZATION (OUTPATIENT)
Dept: OCCUPATIONAL MEDICINE | Facility: CLINIC | Age: 26
End: 2021-10-01
Payer: COMMERCIAL

## 2021-10-01 DIAGNOSIS — Z23 ENCOUNTER FOR VACCINATION: Primary | ICD-10-CM

## 2021-10-01 PROCEDURE — 0003A PFIZER SARS-COV-2 VACCINE: CPT | Performed by: INTERNAL MEDICINE

## 2021-10-01 PROCEDURE — 91300 PFIZER SARS-COV-2 VACCINE: CPT | Performed by: INTERNAL MEDICINE

## 2021-10-20 ENCOUNTER — PHARMACY VISIT (OUTPATIENT)
Dept: PHARMACY | Facility: MEDICAL CENTER | Age: 26
End: 2021-10-20
Payer: COMMERCIAL

## 2021-10-20 ENCOUNTER — TELEMEDICINE (OUTPATIENT)
Dept: BEHAVIORAL HEALTH | Facility: CLINIC | Age: 26
End: 2021-10-20
Payer: COMMERCIAL

## 2021-10-20 VITALS — HEIGHT: 59 IN | BODY MASS INDEX: 26.26 KG/M2

## 2021-10-20 DIAGNOSIS — F41.9 ANXIETY DISORDER, UNSPECIFIED TYPE: ICD-10-CM

## 2021-10-20 DIAGNOSIS — F11.21 OPIOID USE DISORDER, MODERATE, IN EARLY REMISSION (HCC): ICD-10-CM

## 2021-10-20 DIAGNOSIS — F33.42 MDD (MAJOR DEPRESSIVE DISORDER), RECURRENT, IN FULL REMISSION (HCC): ICD-10-CM

## 2021-10-20 PROBLEM — F33.41 MDD (MAJOR DEPRESSIVE DISORDER), RECURRENT, IN PARTIAL REMISSION (HCC): Status: RESOLVED | Noted: 2019-07-01 | Resolved: 2021-10-20

## 2021-10-20 PROCEDURE — RXMED WILLOW AMBULATORY MEDICATION CHARGE: Performed by: PSYCHIATRY & NEUROLOGY

## 2021-10-20 PROCEDURE — 96127 BRIEF EMOTIONAL/BEHAV ASSMT: CPT | Mod: 95 | Performed by: PSYCHIATRY & NEUROLOGY

## 2021-10-20 PROCEDURE — 99214 OFFICE O/P EST MOD 30 MIN: CPT | Mod: 95 | Performed by: PSYCHIATRY & NEUROLOGY

## 2021-10-20 ASSESSMENT — PATIENT HEALTH QUESTIONNAIRE - PHQ9
5. POOR APPETITE OR OVEREATING: 0 - NOT AT ALL
CLINICAL INTERPRETATION OF PHQ2 SCORE: 2
SUM OF ALL RESPONSES TO PHQ QUESTIONS 1-9: 3

## 2021-10-20 NOTE — PROGRESS NOTES
"PSYCHIATRY VIRTUAL VISIT FOLLOW-UP NOTE      Chief Complaint   Patient presents with   • Follow-Up     depression, anxiety       This evaluation was conducted via Zoom using secure and encrypted videoconferencing technology. The patient was in a private location in the [facility].    The patient's identity was confirmed and verbal consent was obtained for this virtual visit.    History Of Present Illness:  Lucy Al is a 26 y.o. female with major depressive disorder, anxiety disorder, opioid use disorder comes in today for follow up, was last seen 3 months ago.  She is doing good in regards to her depression and anxiety.  She is compliant with Zoloft and is doing well on it.  She is trying to get back into tomorrow to have some new hobbies.  She is also looking at a different position at [employer].  She denies any new psychosocial stressors.  She continues to see a therapist on a regular basis.  She is maintaining good boundaries with her ex-boyfriend.  She is doing good in regards to her sleep and appetite.  She denies having thoughts of wanting to hurt herself.    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     Substance Use:  Alcohol - Infrequent alcohol use in social settings   Nicotine - Denies   Cannabis - Smokes cannabis on her 4 off days  Illicit drugs - Denies     Past Medication Trials:  Prozac at age 16 (\"never gave it a chance\"), Wellbutrin at age 16 (\"never gave it a chance\")    Medications:  Current Outpatient Medications   Medication Sig Dispense Refill   • sertraline (ZOLOFT) 50 MG Tab Take 1 Tablet by mouth every day. 90 Tablet 0   • vitamin D (CHOLECALCIFEROL) 1000 UNIT Tab Take 1,000 Units by mouth every day.       No current facility-administered medications for this visit.       Review Of Systems:  " "  Constitutional - Negative for fatigue  Psychiatric - Negative for anxiety, depression, sleep problems    Physical Examination:  Vital signs: Ht 1.499 m (4' 11\")   BMI 26.26 kg/m²     Musculoskeletal: No abnormal movements.     Mental Status Evaluation:   General: Young female, dressed in casual attire, good grooming and hygiene, in no apparent distress, calm and cooperative, fair eye contact, psychomotor retardation  Orientation: Alert and oriented to person, place and time  Recent and remote memory: Grossly intact  Attention span and concentration: Grossly intact  Speech: Spontaneous, normal rate, rhythm and tone  Thought Process: Linear, logical and goal directed  Thought Content: Denies suicidal or homicidal ideations, intent or plan  Perception: No delusions noted  Associations: Intact  Language: Appropriate  Fund of knowledge and vocabulary: Grossly adequate  Mood: \"good\"  Affect: Euthymic, mood congruent  Insight: Good  Judgment: Good    Depression screening:  Depression Screen (PHQ-2/PHQ-9) 2/24/2020 10/19/2020 10/20/2021   PHQ-2 Total Score - - -   PHQ-2 Total Score - 0 -   PHQ-2 Total Score 0 - 2   PHQ-9 Total Score - - -   PHQ-9 Total Score - 0 -   PHQ-9 Total Score - - 3     Interpretation of PHQ-9 Total Score   Score Severity   1-4 No Depression   5-9 Mild Depression   10-14 Moderate Depression   15-19 Moderately Severe Depression   20-27 Severe Depression    Medical Records/Labs/Diagnostic Tests Reviewed:  [place] PDMP records - no prescribed controlled medications found in the last 2 years      Impression:  1.  Major depressive disorder, recurrent, in full remission - stable  2.  Anxiety disorder, unspecified type - stable  3.  Opioid use disorder, moderate, in early remission (last used heroin 7/2019 and Oxycodone 4/2021) - stable    Plan:  1.  Continue Zoloft 50 mg daily for depression and anxiety  2.  Continue to maintain sobriety from opioids and other illicit drugs  3.  Continue individual " psychotherapy with CECI Hernandez     Return to clinic in 3 months or sooner if symptoms worsen    The proposed treatment plan was discussed with the patient who was provided the opportunity to ask questions and make suggestions regarding alternative treatment. Patient verbalized understanding and expressed agreement with the plan.     Kira Onofre M.D.  10/20/21    This note was created using voice recognition software (Dragon). The accuracy of the dictation is limited by the abilities of the software. I have reviewed the note prior to signing, however some errors in grammar and context are still possible. If you have any questions related to this note please do not hesitate to contact our office.

## 2021-11-05 NOTE — BH THERAPY
"Group Therapy Checklist  Attendance: Attended  Attendance Duration (min):  (90)  Number of Participants: 7  Program/Group: Intensive Outpatient Program  Topics Covered:  (Process Group )  Participation: Active verbal participation, Guarded/resistant, Defensive. Lucy is not willing to talk about her feeling or be vulnerable to the group. She states \"I am never afraid.\" She talks in terms of what people are like around her yet will not offer any personal, vulnerable information. She raised her hand with 20 minutes left in group, interrupting the flow of group, to ask if she could be excused early to catch the bus and visit a \"client\" of hers.   Affect/Mood Range: Normal range  Affect/Mood Display: Labile  Cognition: Alert, Oriented  Evidence of Imminent Suicide Risk: No  Evidence of imminent homicide risk: No  Therapeutic Interventions: Emotion clarification, Cognitive clarification, Supportive psychotherapy  Progress Toward Treatment Goal: No change  "
Group Therapy Checklist  Attendance: Attended  Attendance Duration (min):  (60)  Number of Participants: 7  Program/Group: Intensive Outpatient Program  Topics Covered: Assertiveness  Participation: Active verbal participation, Attentive  Affect/Mood Range: Flexible  Affect/Mood Display: Congruent w/content  Cognition: Oriented, Alert  Evidence of Imminent Suicide Risk: No  Evidence of imminent homicide risk: No  Therapeutic Interventions: Psychoeducation re: (Comment), Cognitive clarification  
As certified below, I, or a nurse practitioner or physician assistant working with me, had a face-to-face encounter that meets the physician face-to-face encounter requirements.

## 2022-01-18 ENCOUNTER — TELEMEDICINE (OUTPATIENT)
Dept: BEHAVIORAL HEALTH | Facility: CLINIC | Age: 27
End: 2022-01-18
Payer: COMMERCIAL

## 2022-01-18 DIAGNOSIS — F11.21 OPIOID USE DISORDER, MODERATE, IN EARLY REMISSION (HCC): ICD-10-CM

## 2022-01-18 DIAGNOSIS — F33.42 MDD (MAJOR DEPRESSIVE DISORDER), RECURRENT, IN FULL REMISSION (HCC): ICD-10-CM

## 2022-01-18 DIAGNOSIS — F41.9 ANXIETY DISORDER, UNSPECIFIED TYPE: ICD-10-CM

## 2022-01-18 PROCEDURE — 90833 PSYTX W PT W E/M 30 MIN: CPT | Mod: 95 | Performed by: PSYCHIATRY & NEUROLOGY

## 2022-01-18 PROCEDURE — 99214 OFFICE O/P EST MOD 30 MIN: CPT | Mod: 95 | Performed by: PSYCHIATRY & NEUROLOGY

## 2022-01-18 ASSESSMENT — PATIENT HEALTH QUESTIONNAIRE - PHQ9
5. POOR APPETITE OR OVEREATING: 1 - SEVERAL DAYS
CLINICAL INTERPRETATION OF PHQ2 SCORE: 0

## 2022-01-18 NOTE — PROGRESS NOTES
"PSYCHIATRY VIRTUAL VISIT FOLLOW-UP NOTE      Chief Complaint   Patient presents with   • Follow-Up     depression, anxiety       This evaluation was conducted via Zoom using secure and encrypted videoconferencing technology. The patient was in a private location in the [facility].    The patient's identity was confirmed and verbal consent was obtained for this virtual visit.    History Of Present Illness:  Lucy Al is a 26 y.o. female with major depressive disorder, anxiety disorder, opioid use disorder comes in today for follow up, was last seen 3 months ago.  She has been doing good and aggressive depression and anxiety since her last visit with me.  She has been feeling a little isolated as she does not have any girlfriends that she can stay in touch with on a regular basis.  She continues to have some back and forth struggles with her ex-boyfriend and setting up boundaries with him.  She has recently started going to the gym which has been going really well for her.  She and her parents are doing good in regards to the relationship.  She is compliant with Zoloft and is endorsing benefit.  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  She denies having presently to hurt herself.    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     Substance Use:  Alcohol - Infrequent alcohol use in social settings   Nicotine - Denies   Cannabis - Smokes cannabis on her day off  Illicit drugs - Denies     Past Medication Trials:  Prozac at age 16 (\"never gave it a chance\"), Wellbutrin at age 16 (\"never gave it a chance\")    Medications:  Current Outpatient Medications   Medication Sig Dispense Refill   • sertraline (ZOLOFT) 50 MG Tab Take 1 Tablet by mouth every day. 90 Tablet 0   • vitamin D " "(CHOLECALCIFEROL) 1000 UNIT Tab Take 1,000 Units by mouth every day.       No current facility-administered medications for this visit.       Review Of Systems:    Constitutional - Negative for fatigue  Psychiatric - Negative for anxiety, depression, sleep problems    Physical Examination:  Vital signs: There were no vitals taken for this visit.    Musculoskeletal: No abnormal movements.     Mental Status Evaluation:   General: Young female, dressed in casual attire, good grooming and hygiene, in no apparent distress, calm and cooperative, good eye contact, psychomotor retardation  Orientation: Alert and oriented to person, place and time  Recent and remote memory: Grossly intact  Attention span and concentration: Grossly intact  Speech: Spontaneous, normal rate, rhythm and tone  Thought Process: Linear, logical and goal directed  Thought Content: Denies suicidal or homicidal ideations, intent or plan  Perception: No delusions noted  Associations: Intact  Language: Appropriate  Fund of knowledge and vocabulary: Grossly adequate  Mood: \"good\"  Affect: Euthymic, mood congruent  Insight: Good  Judgment: Good    Depression screening:  Depression Screen (PHQ-2/PHQ-9) 10/19/2020 10/20/2021 1/18/2022   PHQ-2 Total Score - - -   PHQ-2 Total Score 0 - -   PHQ-2 Total Score - 2 0   PHQ-9 Total Score - - -   PHQ-9 Total Score 0 - -   PHQ-9 Total Score - 3 -     Interpretation of PHQ-9 Total Score   Score Severity   1-4 No Depression   5-9 Mild Depression   10-14 Moderate Depression   15-19 Moderately Severe Depression   20-27 Severe Depression    Medical Records/Labs/Diagnostic Tests Reviewed:  [place] PDMP records - no prescribed controlled medications found in the last 2 years      Impression:  1.  Major depressive disorder, recurrent, in full remission - stable  2.  Anxiety disorder, unspecified type - stable  3.  Opioid use disorder, moderate, in early remission (last used heroin 7/2019 and Oxycodone 4/2021) - " stable    Plan:  1.  Continue Zoloft 50 mg daily for depression and anxiety  2.  Continue to maintain sobriety from opioids and other illicit drugs  3.  Continue individual psychotherapy with CECI Hernandez   4.  Provided supportive psychotherapy (> 16 minutes).  Discussed relationship with her ex-boyfriend, David and how it is on and off struggle for her.  Encouraged her to focus on setting boundaries with him and to keep reminding herself how that relationship is not good for her emotionally.  Discussed how being an introverted person makes it difficult for her to make friends as an adult.  Encouraged her to try and talk to likeminded people at the gym and see how it goes.    Return to clinic in 3 months or sooner if symptoms worsen    The proposed treatment plan was discussed with the patient who was provided the opportunity to ask questions and make suggestions regarding alternative treatment. Patient verbalized understanding and expressed agreement with the plan.     Kira Onofre M.D.  01/18/22    This note was created using voice recognition software (Dragon). The accuracy of the dictation is limited by the abilities of the software. I have reviewed the note prior to signing, however some errors in grammar and context are still possible. If you have any questions related to this note please do not hesitate to contact our office.

## 2022-02-10 ENCOUNTER — PHARMACY VISIT (OUTPATIENT)
Dept: PHARMACY | Facility: MEDICAL CENTER | Age: 27
End: 2022-02-10
Payer: COMMERCIAL

## 2022-02-10 PROCEDURE — RXMED WILLOW AMBULATORY MEDICATION CHARGE: Performed by: PSYCHIATRY & NEUROLOGY

## 2022-03-15 ENCOUNTER — TELEPHONE (OUTPATIENT)
Dept: SCHEDULING | Facility: IMAGING CENTER | Age: 27
End: 2022-03-15
Payer: COMMERCIAL

## 2022-03-16 ENCOUNTER — OFFICE VISIT (OUTPATIENT)
Dept: MEDICAL GROUP | Facility: PHYSICIAN GROUP | Age: 27
End: 2022-03-16
Payer: COMMERCIAL

## 2022-03-16 VITALS
HEIGHT: 59 IN | HEART RATE: 79 BPM | TEMPERATURE: 97.2 F | BODY MASS INDEX: 28.83 KG/M2 | SYSTOLIC BLOOD PRESSURE: 110 MMHG | WEIGHT: 143 LBS | OXYGEN SATURATION: 97 % | DIASTOLIC BLOOD PRESSURE: 64 MMHG

## 2022-03-16 DIAGNOSIS — Z76.89 ENCOUNTER TO ESTABLISH CARE: ICD-10-CM

## 2022-03-16 DIAGNOSIS — F33.42 MDD (MAJOR DEPRESSIVE DISORDER), RECURRENT, IN FULL REMISSION (HCC): ICD-10-CM

## 2022-03-16 PROCEDURE — 99213 OFFICE O/P EST LOW 20 MIN: CPT | Performed by: NURSE PRACTITIONER

## 2022-03-16 ASSESSMENT — ENCOUNTER SYMPTOMS
SPUTUM PRODUCTION: 0
CONSTITUTIONAL NEGATIVE: 1
PSYCHIATRIC NEGATIVE: 1
FEVER: 0
PALPITATIONS: 0
GASTROINTESTINAL NEGATIVE: 1
MUSCULOSKELETAL NEGATIVE: 1
EYES NEGATIVE: 1
NEUROLOGICAL NEGATIVE: 1
SHORTNESS OF BREATH: 0
COUGH: 0

## 2022-03-16 NOTE — PROGRESS NOTES
"Subjective:     CC:    Chief Complaint   Patient presents with   • Establish Care        HISTORY OF THE PRESENT ILLNESS: Patient is a 26 y.o. female, here today to establish care. Prior PCP was none. The below problems were discussed/reviewed at this visit:    Problem   Mdd (Major Depressive Disorder), Recurrent, in Full Remission (Hcc)    Followed by Psychiatry/Dr Onofre, visits every 3 months; Taking Sertraline 50mg QD;         Current Outpatient Medications Ordered in Epic   Medication Sig Dispense Refill   • MAGNESIUM PO Take  by mouth.     • sertraline (ZOLOFT) 50 MG Tab Take 1 Tablet by mouth every day. 90 Tablet 0   • vitamin D (CHOLECALCIFEROL) 1000 UNIT Tab Take 1,000 Units by mouth every day.       No current [facility]-ordered facility-administered medications on file.        No past surgical history on file.     Allergies:  Patient has no known allergies.    Health Maintenance: Completed  - not on BCP, not sexually active; irregular period, had very light spotting for 1 day 2/2022. Normal PAP 6/2020    ROS:   Review of Systems   Constitutional: Negative.  Negative for fever and malaise/fatigue.   HENT: Negative.    Eyes: Negative.    Respiratory: Negative for cough, sputum production and shortness of breath.    Cardiovascular: Negative for chest pain, palpitations and leg swelling.   Gastrointestinal: Negative.    Genitourinary: Negative.    Musculoskeletal: Negative.    Neurological: Negative.    Endo/Heme/Allergies: Negative.    Psychiatric/Behavioral: Negative.        Objective:     Exam: /64 (BP Location: Left arm, Patient Position: Sitting, BP Cuff Size: Adult)   Pulse 79   Temp 36.2 °C (97.2 °F) (Temporal)   Ht 1.499 m (4' 11\")   Wt 64.9 kg (143 lb)   SpO2 97%  Body mass index is 28.88 kg/m².    Physical Exam  Constitutional:       Appearance: Normal appearance.   Cardiovascular:      Rate and Rhythm: Normal rate and regular rhythm.      Pulses: Normal pulses.      Heart sounds: Normal heart " sounds.   Pulmonary:      Effort: Pulmonary effort is normal.      Breath sounds: Normal breath sounds.   Musculoskeletal:         General: Normal range of motion.      Cervical back: Normal range of motion and neck supple.   Skin:     General: Skin is warm and dry.   Neurological:      General: No focal deficit present.      Mental Status: She is alert and oriented to person, place, and time.   Psychiatric:         Mood and Affect: Mood normal.         Behavior: Behavior normal.         Thought Content: Thought content normal.         Judgment: Judgment normal.       Labs: reviewed from 4/2021    Assessment & Plan:   26 y.o. female with the following -    Problem List Items Addressed This Visit     MDD (major depressive disorder), recurrent, in full remission (HCC)     Denies SI/self harm today; continue treatment plan per dr Onofre  - Sertraline 50mg QD  - I recommend she speak with her therapist about stopping marijuana use, which she inquired about today            Other Visit Diagnoses     Encounter to establish care        Relevant Orders    Comp Metabolic Panel    Lipid Profile          Educated in proper administration of medication(s) ordered today including safety, possible SE, risks, benefits, rationale and alternatives to therapy.     Return in about 1 year (around 3/16/2023) for Annual Visit.    Please note that this dictation was created using voice recognition software. I have made every reasonable attempt to correct obvious errors, but I expect that there are errors of grammar and possibly content that I did not discover before finalizing the note.

## 2022-03-16 NOTE — ASSESSMENT & PLAN NOTE
Denies SI/self harm today; continue treatment plan per dr Onofre  - Sertraline 50mg QD  - I recommend she speak with her therapist about stopping marijuana use, which she inquired about today

## 2022-04-11 ENCOUNTER — TELEPHONE (OUTPATIENT)
Dept: SCHEDULING | Facility: IMAGING CENTER | Age: 27
End: 2022-04-11
Payer: COMMERCIAL

## 2022-04-14 ENCOUNTER — TELEMEDICINE (OUTPATIENT)
Dept: BEHAVIORAL HEALTH | Facility: CLINIC | Age: 27
End: 2022-04-14
Payer: COMMERCIAL

## 2022-04-14 DIAGNOSIS — F33.42 MDD (MAJOR DEPRESSIVE DISORDER), RECURRENT, IN FULL REMISSION (HCC): ICD-10-CM

## 2022-04-14 DIAGNOSIS — F41.9 ANXIETY DISORDER, UNSPECIFIED TYPE: ICD-10-CM

## 2022-04-14 DIAGNOSIS — F11.21 OPIOID USE DISORDER, MODERATE, IN EARLY REMISSION (HCC): ICD-10-CM

## 2022-04-14 PROCEDURE — 90833 PSYTX W PT W E/M 30 MIN: CPT | Mod: 95 | Performed by: PSYCHIATRY & NEUROLOGY

## 2022-04-14 PROCEDURE — 99214 OFFICE O/P EST MOD 30 MIN: CPT | Mod: 95 | Performed by: PSYCHIATRY & NEUROLOGY

## 2022-04-14 NOTE — PROGRESS NOTES
"PSYCHIATRY VIRTUAL VISIT FOLLOW-UP NOTE      Chief Complaint   Patient presents with   • Follow-Up     Depression, anxiety       This evaluation was conducted via Zoom using secure and encrypted videoconferencing technology.   The patient was in their home in the [facility].    The patient's identity was confirmed and verbal consent was obtained for this virtual visit.    History Of Present Illness:  Lucy Al is a 26 y.o. female with major depressive disorder, anxiety disorder, opioid use disorder comes in today for follow up, was last seen 3 months ago.  She has been doing alright in regards to her depression and anxiety.  She has been compliant with Zoloft and is endorsing benefit.  She has been upset at a friend who was missing from her life for a while but recently reconnected and asked for some money.  She gave her $60 and since then she has not been in her life again.  She is extremely upset at how she used her.  She feels that a lot of people have used in the past and it affects her a lot.  She has also been thinking about her future and what she wants to do and is frustrated at times that she does not know what she wants.  She denies having thoughts of wanting to hurt herself.    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     Substance Use:  Alcohol - Infrequent alcohol use in social settings   Nicotine - Denies   Cannabis - Smokes cannabis on her day off, has been cutting down on quantity and frequency  Illicit drugs - Denies     Past Medication Trials:  Prozac at age 16 (\"never gave it a chance\"), Wellbutrin at age 16 (\"never gave it a chance\")    Medications:  Current Outpatient Medications   Medication Sig Dispense Refill   • MAGNESIUM PO Take  by mouth.     • sertraline (ZOLOFT) " "50 MG Tab Take 1 Tablet by mouth every day. 90 Tablet 0   • vitamin D (CHOLECALCIFEROL) 1000 UNIT Tab Take 1,000 Units by mouth every day.       No current facility-administered medications for this visit.       Review Of Systems:    Constitutional - Negative for fatigue  Psychiatric - Negative for anxiety, depression, sleep problems    Physical Examination:  Vital signs: There were no vitals taken for this visit.    Musculoskeletal: No abnormal movements.     Mental Status Evaluation:   General: Young female, dressed in casual attire, good grooming and hygiene, in no apparent distress, calm and cooperative, good eye contact, psychomotor retardation  Orientation: Alert and oriented to person, place and time  Recent and remote memory: Grossly intact  Attention span and concentration: Grossly intact  Speech: Spontaneous, normal rate, rhythm and tone  Thought Process: Linear, logical and goal directed  Thought Content: Denies suicidal or homicidal ideations, intent or plan  Perception: No delusions noted  Associations: Intact  Language: Appropriate  Fund of knowledge and vocabulary: Grossly adequate  Mood: \"good\"  Affect: Euthymic, mood congruent  Insight: Good  Judgment: Good    Depression screening:  Depression Screen (PHQ-2/PHQ-9) 10/19/2020 10/20/2021 1/18/2022   PHQ-2 Total Score - - -   PHQ-2 Total Score 0 - -   PHQ-2 Total Score - 2 0   PHQ-9 Total Score - - -   PHQ-9 Total Score 0 - -   PHQ-9 Total Score - 3 -     Interpretation of PHQ-9 Total Score   Score Severity   1-4 No Depression   5-9 Mild Depression   10-14 Moderate Depression   15-19 Moderately Severe Depression   20-27 Severe Depression    Medical Records/Labs/Diagnostic Tests Reviewed:  [place] PDMP records - no prescribed controlled medications found in the last 2 years      Impression:  1.  Major depressive disorder, recurrent, in full remission - stable  2.  Anxiety disorder, unspecified type - stable  3.  Opioid use disorder, moderate, in early " remission (last used heroin 7/2019 and Oxycodone 4/2021) - stable    Plan:  1.  Continue Zoloft 50 mg daily for depression and anxiety  2.  Continue to maintain sobriety from opioids and other illicit drugs  3.  Continue individual psychotherapy with CECI Hernandez   4.  Encourage her to slowly cut down on both quantity and frequency of cannabis use.  I let her know that there are no medications that are approved for cannabis use disorder.  5.  Provided supportive psychotherapy (> 16 minutes).  Discussed situation with her friend and how it affected her.  Discussed learning from every relationship or friendship and to make sure that she is taking her time to develop trust and future relationships and friendships.  Request continued self-care.  Encouraged her to try and develop hobbies which will help her use less cannabis as she is using it only when she is bored.    Return to clinic in 3 months or sooner if symptoms worsen    The proposed treatment plan was discussed with the patient who was provided the opportunity to ask questions and make suggestions regarding alternative treatment. Patient verbalized understanding and expressed agreement with the plan.     Kira Onofre M.D.  04/14/22    This note was created using voice recognition software (Dragon). The accuracy of the dictation is limited by the abilities of the software. I have reviewed the note prior to signing, however some errors in grammar and context are still possible. If you have any questions related to this note please do not hesitate to contact our office.

## 2022-05-04 ENCOUNTER — PHARMACY VISIT (OUTPATIENT)
Dept: PHARMACY | Facility: MEDICAL CENTER | Age: 27
End: 2022-05-04
Payer: COMMERCIAL

## 2022-05-04 PROCEDURE — RXMED WILLOW AMBULATORY MEDICATION CHARGE: Performed by: PSYCHIATRY & NEUROLOGY

## 2022-06-01 ENCOUNTER — OFFICE VISIT (OUTPATIENT)
Dept: MEDICAL GROUP | Facility: PHYSICIAN GROUP | Age: 27
End: 2022-06-01
Payer: COMMERCIAL

## 2022-06-01 VITALS
WEIGHT: 138 LBS | SYSTOLIC BLOOD PRESSURE: 100 MMHG | TEMPERATURE: 97.2 F | HEIGHT: 59 IN | OXYGEN SATURATION: 98 % | RESPIRATION RATE: 19 BRPM | HEART RATE: 76 BPM | DIASTOLIC BLOOD PRESSURE: 72 MMHG | BODY MASS INDEX: 27.82 KG/M2

## 2022-06-01 DIAGNOSIS — F41.9 ANXIETY DISORDER, UNSPECIFIED TYPE: ICD-10-CM

## 2022-06-01 DIAGNOSIS — E66.3 OVERWEIGHT (BMI 25.0-29.9): ICD-10-CM

## 2022-06-01 DIAGNOSIS — N91.2 AMENORRHEA: ICD-10-CM

## 2022-06-01 DIAGNOSIS — F33.42 MDD (MAJOR DEPRESSIVE DISORDER), RECURRENT, IN FULL REMISSION (HCC): ICD-10-CM

## 2022-06-01 DIAGNOSIS — F11.21 OPIOID USE DISORDER, MODERATE, IN EARLY REMISSION (HCC): ICD-10-CM

## 2022-06-01 DIAGNOSIS — Z76.89 ESTABLISHING CARE WITH NEW DOCTOR, ENCOUNTER FOR: ICD-10-CM

## 2022-06-01 DIAGNOSIS — E55.9 VITAMIN D DEFICIENCY: ICD-10-CM

## 2022-06-01 DIAGNOSIS — Z00.00 ROUTINE HEALTH MAINTENANCE: ICD-10-CM

## 2022-06-01 PROBLEM — H00.014 HORDEOLUM EXTERNUM OF LEFT UPPER EYELID: Status: RESOLVED | Noted: 2018-10-22 | Resolved: 2022-06-01

## 2022-06-01 PROCEDURE — 99214 OFFICE O/P EST MOD 30 MIN: CPT | Performed by: NURSE PRACTITIONER

## 2022-06-01 NOTE — ASSESSMENT & PLAN NOTE
New to examiner. Patient reports that this has been an issue for the past couple years. States that when she was on birth control about 1.5 to 2 years ago, she would have a regular period but reports that she does not like being on birth control.  States that her last regular period was when she was previously on the birth control. Is not currently seeing gynecology for this issue.

## 2022-06-01 NOTE — ASSESSMENT & PLAN NOTE
New to examiner.  Patient had previously used heroin and opioid medication.  States that she has not used heroin since 2019.  Reports that in April 2021 she had an opioid overdose but that she has not used it since.

## 2022-06-01 NOTE — ASSESSMENT & PLAN NOTE
New to examiner, ongoing for patient.  Continue sertraline 50 mg once daily, denies problems with the medication.  Is currently following with psychiatry every 3 months and is seeing a counselor once a month. Patient feels like current treatment plan is helpful.

## 2022-06-01 NOTE — PROGRESS NOTES
CC:   Chief Complaint   Patient presents with   • Establish Care     Pt hasnt had period in over 2 years. Wants to discuss     HISTORY OF THE PRESENT ILLNESS: Patient is a 26 y.o. female. This pleasant patient is here today to establish care and discuss multiple issues as listed below.     Health Maintenance: Reviewed    MDD (major depressive disorder), recurrent, in full remission (HCC)  Anxiety disorder  New to examiner, ongoing for patient.  Continue sertraline 50 mg once daily, denies problems with the medication.  Is currently following with psychiatry every 3 months and is seeing a counselor once a month. Patient feels like current treatment plan is helpful.    Vitamin D deficiency  New to examiner.  Patient continues vitamin D 1000 units once daily.  Due for updated labs.     Opioid use disorder, moderate, in early remission (HCC)  New to examiner.  Patient had previously used heroin and opioid medication.  States that she has not used heroin since 2019.  Reports that in April 2021 she had an opioid overdose but that she has not used it since.    Amenorrhea  New to examiner. Patient reports that this has been an issue for the past couple years. States that when she was on birth control about 1.5 to 2 years ago, she would have a regular period but reports that she does not like being on birth control.  States that her last regular period was when she was previously on the birth control. Is not currently seeing gynecology for this issue.    Allergies: Patient has no known allergies.  Current Outpatient Medications Ordered in Epic   Medication Sig Dispense Refill   • sertraline (ZOLOFT) 50 MG Tab Take 1 Tablet by mouth every day. 90 Tablet 0   • vitamin D (CHOLECALCIFEROL) 1000 UNIT Tab Take 1,000 Units by mouth every day.       No current [facility]-ordered facility-administered medications on file.     Past Medical History:   Diagnosis Date   • Anxiety    • Depression    • Pericarditis    • Substance abuse (HCC)   "    History reviewed. No pertinent surgical history.  Social History     Tobacco Use   • Smoking status: Former Smoker     Packs/day: 0.50     Years: 9.00     Pack years: 4.50     Types: Cigarettes     Start date: 1/1/2010     Quit date: 1/1/2019     Years since quitting: 3.4   • Smokeless tobacco: Never Used   Vaping Use   • Vaping Use: Never used   Substance Use Topics   • Alcohol use: Not Currently     Alcohol/week: 2.4 oz     Types: 4 Cans of beer per week   • Drug use: Yes     Types: Marijuana, Inhaled     Comment: No heroin use in 3 years     Social History     Social History Narrative   • Not on file     Family History   Problem Relation Age of Onset   • Hypertension Mother    • Alcohol/Drug Father         alcoholism   • Psychiatric Illness Father         bipolar disorder   • Bipolar disorder Father    • Drug abuse Father         methamphetamine addiction   • No Known Problems Sister    • No Known Problems Brother    • Cancer Maternal Aunt    • Breast Cancer Maternal Aunt    • Diabetes Maternal Uncle    • Arthritis Maternal Grandmother    • Heart Attack Maternal Grandfather      ROS:   Constitutional: No fevers, chills, malaise/fatigue.  Eyes: No eye pain.  ENT: No sore throat, congestion.   Resp: No cough, shortness of breath.  CV: No chest pain, leg swelling, palpitations.  GI: No nausea/vomiting, abdominal pain, constipation, diarrhea.  : No dysuria, hematuria.  MSK: No weakness.  Skin: No rashes.  Neuro: No dizziness, weakness, headaches.  Psych: No suicidal ideations.    All remaining systems reviewed and found to be negative, except as stated above.          Exam: /72 (BP Location: Left arm, Patient Position: Sitting, BP Cuff Size: Adult)   Pulse 76   Temp 36.2 °C (97.2 °F) (Temporal)   Resp 19   Ht 1.499 m (4' 11\")   Wt 62.6 kg (138 lb)   SpO2 98%  Body mass index is 27.87 kg/m².    General: Normal appearing. No distress.  HEENT: Tonsils 2+ bilaterally.  Normocephalic. Eyes conjunctiva " clear lids without ptosis, pupils equal and reactive to light accommodation, ears normal shape and contour, canals are clear bilaterally, tympanic membranes are benign, nasal mucosa benign, oropharynx is without erythema  or exudates. Sinuses (frontal and maxillary) nontender to palpation.  Neck: Supple without JVD or bruit. Thyroid is not enlarged.  Pulmonary: Clear to ausculation.  Normal effort. No rales, rhonchi, or wheezing.  Cardiovascular: Regular rate and rhythm without murmur. Carotid and radial pulses are intact and equal bilaterally.  Abdomen: Soft, nontender, nondistended. Normal bowel sounds.  Neurologic: Grossly nonfocal.  Lymph: No cervical or supraclavicular lymph nodes are palpable.  Skin: Warm and dry.  No obvious lesions.  Musculoskeletal: Normal gait. No extremity cyanosis, clubbing, or edema.  Psych: Normal mood and affect. Alert and oriented x3. Judgment and insight is normal.     Assessment/Plan:  1. Establishing care with new doctor, encounter for    2. Opioid use disorder, moderate, in early remission ([facility])  Encourage patient to continue to abstain from heroin and opiates. Continue to follow with behavioral health.     3. MDD (major depressive disorder), recurrent, in full remission ([facility])  4. Anxiety disorder, unspecified type  Continue to follow with behavioral health regularly and continue sertraline 50 mg once daily as managed by psychiatry.  Due for updated labs.  - CBC WITH DIFFERENTIAL; Future  - TSH WITH REFLEX TO FT4; Future    5. Amenorrhea  Referral to gynecology for further evaluation and management.  Due for updated labs.  - CBC WITH DIFFERENTIAL; Future  - Comp Metabolic Panel; Future  - TSH WITH REFLEX TO FT4; Future  - Referral to Gynecology    6. Vitamin D deficiency  Continue over-the-counter vitamin D supplementation 1000 units daily, due for updated labs.  - VITAMIN D,25 HYDROXY; Future    7. Overweight (BMI 25.0-29.9)  Due for updated labs.  - CBC WITH DIFFERENTIAL;  Future  - Comp Metabolic Panel; Future  - Lipid Profile; Future  - TSH WITH REFLEX TO FT4; Future    8. Routine health maintenance  Due for updated labs.  - CBC WITH DIFFERENTIAL; Future  - Comp Metabolic Panel; Future  - Lipid Profile; Future  - TSH WITH REFLEX TO FT4; Future  - VITAMIN D,25 HYDROXY; Future     Educated in proper administration of medication(s) ordered today including safety, possible SE, risks, benefits, rationale and alternatives to therapy.   Supportive care, differential diagnoses, and indications for immediate follow-up discussed with patient.    Pathogenesis of diagnosis discussed including typical length and natural progression.    Instructed to return to clinic or nearest emergency department for any change in condition, further concerns, or worsening of symptoms.  Patient states understanding of the plan of care and discharge instructions.    Return in about 1 month (around 7/1/2022) for Follow up Labs.    Please note that this dictation was created using voice recognition software. I have made every reasonable attempt to correct obvious errors, but I expect that there are errors of grammar and possibly content that I did not discover before finalizing the note.

## 2022-06-01 NOTE — ASSESSMENT & PLAN NOTE
Issues with periods for years  Just stopped having periods-except when taking birth control   1.5-2 years ago was on BC- last time regular- faint light pink discharge occasionally

## 2022-06-07 ENCOUNTER — HOSPITAL ENCOUNTER (OUTPATIENT)
Dept: LAB | Facility: MEDICAL CENTER | Age: 27
End: 2022-06-07
Attending: NURSE PRACTITIONER
Payer: COMMERCIAL

## 2022-06-07 DIAGNOSIS — E55.9 VITAMIN D DEFICIENCY: ICD-10-CM

## 2022-06-07 DIAGNOSIS — N91.2 AMENORRHEA: ICD-10-CM

## 2022-06-07 DIAGNOSIS — E66.3 OVERWEIGHT (BMI 25.0-29.9): ICD-10-CM

## 2022-06-07 DIAGNOSIS — Z00.00 ROUTINE HEALTH MAINTENANCE: ICD-10-CM

## 2022-06-07 DIAGNOSIS — F41.9 ANXIETY DISORDER, UNSPECIFIED TYPE: ICD-10-CM

## 2022-06-07 DIAGNOSIS — F33.42 MDD (MAJOR DEPRESSIVE DISORDER), RECURRENT, IN FULL REMISSION (HCC): ICD-10-CM

## 2022-06-07 LAB
25(OH)D3 SERPL-MCNC: 27 NG/ML (ref 30–100)
ALBUMIN SERPL BCP-MCNC: 4.5 G/DL (ref 3.2–4.9)
ALBUMIN/GLOB SERPL: 1.8 G/DL
ALP SERPL-CCNC: 105 U/L (ref 30–99)
ALT SERPL-CCNC: 15 U/L (ref 2–50)
ANION GAP SERPL CALC-SCNC: 10 MMOL/L (ref 7–16)
AST SERPL-CCNC: 19 U/L (ref 12–45)
BASOPHILS # BLD AUTO: 0.8 % (ref 0–1.8)
BASOPHILS # BLD: 0.05 K/UL (ref 0–0.12)
BILIRUB SERPL-MCNC: 0.2 MG/DL (ref 0.1–1.5)
BUN SERPL-MCNC: 9 MG/DL (ref 8–22)
CALCIUM SERPL-MCNC: 8.8 MG/DL (ref 8.5–10.5)
CHLORIDE SERPL-SCNC: 106 MMOL/L (ref 96–112)
CHOLEST SERPL-MCNC: 140 MG/DL (ref 100–199)
CO2 SERPL-SCNC: 23 MMOL/L (ref 20–33)
CREAT SERPL-MCNC: 0.66 MG/DL (ref 0.5–1.4)
EOSINOPHIL # BLD AUTO: 0.08 K/UL (ref 0–0.51)
EOSINOPHIL NFR BLD: 1.2 % (ref 0–6.9)
ERYTHROCYTE [DISTWIDTH] IN BLOOD BY AUTOMATED COUNT: 39.5 FL (ref 35.9–50)
FASTING STATUS PATIENT QL REPORTED: NORMAL
GFR SERPLBLD CREATININE-BSD FMLA CKD-EPI: 123 ML/MIN/1.73 M 2
GLOBULIN SER CALC-MCNC: 2.5 G/DL (ref 1.9–3.5)
GLUCOSE SERPL-MCNC: 91 MG/DL (ref 65–99)
HCT VFR BLD AUTO: 44.2 % (ref 37–47)
HDLC SERPL-MCNC: 69 MG/DL
HGB BLD-MCNC: 14.3 G/DL (ref 12–16)
IMM GRANULOCYTES # BLD AUTO: 0.01 K/UL (ref 0–0.11)
IMM GRANULOCYTES NFR BLD AUTO: 0.2 % (ref 0–0.9)
LDLC SERPL CALC-MCNC: 61 MG/DL
LYMPHOCYTES # BLD AUTO: 3.14 K/UL (ref 1–4.8)
LYMPHOCYTES NFR BLD: 47.8 % (ref 22–41)
MCH RBC QN AUTO: 26.5 PG (ref 27–33)
MCHC RBC AUTO-ENTMCNC: 32.4 G/DL (ref 33.6–35)
MCV RBC AUTO: 81.9 FL (ref 81.4–97.8)
MONOCYTES # BLD AUTO: 0.55 K/UL (ref 0–0.85)
MONOCYTES NFR BLD AUTO: 8.4 % (ref 0–13.4)
NEUTROPHILS # BLD AUTO: 2.74 K/UL (ref 2–7.15)
NEUTROPHILS NFR BLD: 41.6 % (ref 44–72)
NRBC # BLD AUTO: 0 K/UL
NRBC BLD-RTO: 0 /100 WBC
PLATELET # BLD AUTO: 238 K/UL (ref 164–446)
PMV BLD AUTO: 10.6 FL (ref 9–12.9)
POTASSIUM SERPL-SCNC: 4 MMOL/L (ref 3.6–5.5)
PROT SERPL-MCNC: 7 G/DL (ref 6–8.2)
RBC # BLD AUTO: 5.4 M/UL (ref 4.2–5.4)
SODIUM SERPL-SCNC: 139 MMOL/L (ref 135–145)
TRIGL SERPL-MCNC: 50 MG/DL (ref 0–149)
TSH SERPL DL<=0.005 MIU/L-ACNC: 0.96 UIU/ML (ref 0.38–5.33)
WBC # BLD AUTO: 6.6 K/UL (ref 4.8–10.8)

## 2022-06-07 PROCEDURE — 36415 COLL VENOUS BLD VENIPUNCTURE: CPT

## 2022-06-07 PROCEDURE — 82306 VITAMIN D 25 HYDROXY: CPT

## 2022-06-07 PROCEDURE — 85025 COMPLETE CBC W/AUTO DIFF WBC: CPT

## 2022-06-07 PROCEDURE — 80053 COMPREHEN METABOLIC PANEL: CPT

## 2022-06-07 PROCEDURE — 84443 ASSAY THYROID STIM HORMONE: CPT

## 2022-06-07 PROCEDURE — 80061 LIPID PANEL: CPT

## 2022-06-20 ENCOUNTER — HOSPITAL ENCOUNTER (OUTPATIENT)
Dept: LAB | Facility: MEDICAL CENTER | Age: 27
End: 2022-06-20
Attending: NURSE PRACTITIONER
Payer: COMMERCIAL

## 2022-06-20 ENCOUNTER — OFFICE VISIT (OUTPATIENT)
Dept: MEDICAL GROUP | Facility: PHYSICIAN GROUP | Age: 27
End: 2022-06-20
Payer: COMMERCIAL

## 2022-06-20 VITALS
DIASTOLIC BLOOD PRESSURE: 32 MMHG | SYSTOLIC BLOOD PRESSURE: 108 MMHG | OXYGEN SATURATION: 98 % | HEIGHT: 59 IN | TEMPERATURE: 98 F | WEIGHT: 138 LBS | HEART RATE: 93 BPM | BODY MASS INDEX: 27.82 KG/M2

## 2022-06-20 DIAGNOSIS — E55.9 VITAMIN D DEFICIENCY: ICD-10-CM

## 2022-06-20 DIAGNOSIS — R79.89 ABNORMAL CBC: ICD-10-CM

## 2022-06-20 DIAGNOSIS — N91.2 AMENORRHEA: ICD-10-CM

## 2022-06-20 LAB
FOLATE SERPL-MCNC: 15.6 NG/ML
IRON SATN MFR SERPL: 22 % (ref 15–55)
IRON SERPL-MCNC: 80 UG/DL (ref 40–170)
TIBC SERPL-MCNC: 368 UG/DL (ref 250–450)
UIBC SERPL-MCNC: 288 UG/DL (ref 110–370)
VIT B12 SERPL-MCNC: 612 PG/ML (ref 211–911)

## 2022-06-20 PROCEDURE — 36415 COLL VENOUS BLD VENIPUNCTURE: CPT

## 2022-06-20 PROCEDURE — 82607 VITAMIN B-12: CPT

## 2022-06-20 PROCEDURE — 83540 ASSAY OF IRON: CPT

## 2022-06-20 PROCEDURE — 99213 OFFICE O/P EST LOW 20 MIN: CPT | Performed by: NURSE PRACTITIONER

## 2022-06-20 PROCEDURE — 83550 IRON BINDING TEST: CPT

## 2022-06-20 PROCEDURE — 82746 ASSAY OF FOLIC ACID SERUM: CPT

## 2022-06-20 ASSESSMENT — FIBROSIS 4 INDEX: FIB4 SCORE: 0.54

## 2022-06-20 NOTE — ASSESSMENT & PLAN NOTE
Ongoing, patient was previously taking over-the-counter vitamin D 1000 units once daily, she is now taking 2000 units a day since receiving lab results.

## 2022-06-20 NOTE — PROGRESS NOTES
CC:   Chief Complaint   Patient presents with   • Lab Results     FV     HISTORY OF THE PRESENT ILLNESS: Patient is a 26 y.o. female. This pleasant patient is here today to review lab results.  Health Maintenance: Reviewed    Vitamin D deficiency  Ongoing, patient was previously taking over-the-counter vitamin D 1000 units once daily, she is now taking 2000 units a day since receiving lab results.    Amenorrhea  Scheduled to establish with gynecology 8/24/2022.     Allergies: Patient has no known allergies.  Current Outpatient Medications Ordered in Epic   Medication Sig Dispense Refill   • VITAMIN D PO Take 2,000 Units by mouth every day.     • sertraline (ZOLOFT) 50 MG Tab Take 1 Tablet by mouth every day. 90 Tablet 0     No current Epic-ordered facility-administered medications on file.     Past Medical History:   Diagnosis Date   • Anxiety    • Depression    • Pericarditis    • Substance abuse (HCC)      History reviewed. No pertinent surgical history.  Social History     Tobacco Use   • Smoking status: Former Smoker     Packs/day: 0.50     Years: 9.00     Pack years: 4.50     Types: Cigarettes     Start date: 1/1/2010     Quit date: 1/1/2019     Years since quitting: 3.4   • Smokeless tobacco: Never Used   Vaping Use   • Vaping Use: Never used   Substance Use Topics   • Alcohol use: Not Currently     Alcohol/week: 2.4 oz     Types: 4 Cans of beer per week   • Drug use: Yes     Types: Marijuana, Inhaled     Comment: No heroin use in 3 years     Social History     Social History Narrative   • Not on file     Family History   Problem Relation Age of Onset   • Hypertension Mother    • Alcohol/Drug Father         alcoholism   • Psychiatric Illness Father         bipolar disorder   • Bipolar disorder Father    • Drug abuse Father         methamphetamine addiction   • No Known Problems Sister    • No Known Problems Brother    • Cancer Maternal Aunt    • Breast Cancer Maternal Aunt    • Diabetes Maternal Uncle    •  "Arthritis Maternal Grandmother    • Heart Attack Maternal Grandfather      ROS:   Constitutional: No fevers, chills, malaise/fatigue.  Eyes: No eye pain.  ENT: No sore throat, congestion.   Resp: No cough, shortness of breath.  CV: No chest pain, leg swelling, palpitations.  GI: No nausea/vomiting, abdominal pain, constipation, diarrhea.  : No dysuria, hematuria.  MSK: No weakness.  Skin: No rashes.  Neuro: No dizziness, weakness, headaches.  Psych: No suicidal ideations.    All remaining systems reviewed and found to be negative, except as stated above.          Exam: BP (!) 108/32 (BP Location: Left arm, Patient Position: Sitting, BP Cuff Size: Adult)   Pulse 93   Temp 36.7 °C (98 °F) (Temporal)   Ht 1.499 m (4' 11\")   Wt 62.6 kg (138 lb)   SpO2 98%  Body mass index is 27.87 kg/m².    General: Well nourished, well developed female in NAD, awake and conversant.  Eyes: Normal conjunctiva, anicteric.  Round symmetrical pupils.  ENT: Hearing grossly intact.  No nasal discharge.  Neck: Neck is supple.  No masses or thyromegaly.  CV: No lower extremity edema.  Respiratory: Respirations are nonlabored.  No wheezing.  Abdomen: Non-Distended.  Skin: Warm.  No rashes or ulcers.  MSK: Normal ambulation.  No clubbing or cyanosis.  Neuro: Sensation and CN II-XII grossly normal.  Psych: Alert and oriented.  Cooperative, appropriate mood and affect, normal judgment.      Assessment/Plan:  1. Vitamin D deficiency  Continue vitamin D 2000 units daily.  Due for annual labs in June 2023 prior to annual follow-up.    2. Abnormal CBC  Plan to have patient complete labs today: Vitamin B12, folate, iron/total iron-binding.  Will notify patient of results and treatment if indicated through [facility].  - VITAMIN B12; Future  - FOLATE; Future  - IRON/TOTAL IRON BIND; Future    3. Amenorrhea  Scheduled to establish with gynecology 8/24/2022.    Educated in proper administration of medication(s) ordered today including safety, possible " SE, risks, benefits, rationale and alternatives to therapy.   Supportive care, differential diagnoses, and indications for immediate follow-up discussed with patient.    Pathogenesis of diagnosis discussed including typical length and natural progression.    Instructed to return to clinic or nearest emergency department for any change in condition, further concerns, or worsening of symptoms.  Patient states understanding of the plan of care and discharge instructions.    Return in about 1 year (around 6/29/2023) for Preventative Annual, Follow up Labs.    Please note that this dictation was created using voice recognition software. I have made every reasonable attempt to correct obvious errors, but I expect that there are errors of grammar and possibly content that I did not discover before finalizing the note.

## 2022-06-22 DIAGNOSIS — E55.9 VITAMIN D DEFICIENCY: ICD-10-CM

## 2022-06-22 DIAGNOSIS — E66.3 OVERWEIGHT (BMI 25.0-29.9): ICD-10-CM

## 2022-06-23 NOTE — PROGRESS NOTES
1. Overweight (BMI 25.0-29.9)  - CBC WITH DIFFERENTIAL; Future  - Comp Metabolic Panel; Future  - Lipid Profile; Future  - TSH WITH REFLEX TO FT4; Future    2. Vitamin D deficiency  - VITAMIN D,25 HYDROXY; Future     Due for annual labs in June 2023 prior to annual follow-up.

## 2022-07-14 ENCOUNTER — TELEMEDICINE (OUTPATIENT)
Dept: BEHAVIORAL HEALTH | Facility: CLINIC | Age: 27
End: 2022-07-14
Payer: COMMERCIAL

## 2022-07-14 DIAGNOSIS — F33.42 MDD (MAJOR DEPRESSIVE DISORDER), RECURRENT, IN FULL REMISSION (HCC): ICD-10-CM

## 2022-07-14 DIAGNOSIS — F41.9 ANXIETY DISORDER, UNSPECIFIED TYPE: ICD-10-CM

## 2022-07-14 DIAGNOSIS — F11.21 OPIOID USE DISORDER, MODERATE, IN SUSTAINED REMISSION (HCC): ICD-10-CM

## 2022-07-14 PROCEDURE — RXMED WILLOW AMBULATORY MEDICATION CHARGE: Performed by: PSYCHIATRY & NEUROLOGY

## 2022-07-14 PROCEDURE — 99214 OFFICE O/P EST MOD 30 MIN: CPT | Mod: 95 | Performed by: PSYCHIATRY & NEUROLOGY

## 2022-07-14 NOTE — PROGRESS NOTES
"PSYCHIATRY VIRTUAL VISIT FOLLOW-UP NOTE      Chief Complaint   Patient presents with   • Follow-Up     Depression, anxiety       This evaluation was conducted via Zoom using secure and encrypted videoconferencing technology.   The patient was in their home in the [facility].    The patient's identity was confirmed and verbal consent was obtained for this virtual visit.    History Of Present Illness:  Lucy Al is a 26 y.o. female with major depressive disorder, anxiety disorder, opioid use disorder comes in today for follow up, was last seen 3 months ago.  She has been doing good in regards to both anxiety and depression.  She has been compliant with Zoloft and is endorsing benefit.  She has been enjoying her summer as much as possible.  She denies any new psychosocial stressors.  She continues to maintain opioids.  She denies having thoughts of wanting to hurt herself.    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     Substance Use:  Alcohol - Infrequent alcohol use in social settings   Nicotine - Denies   Cannabis - Smokes cannabis few days a week  Illicit drugs - Denies     Past Medication Trials:  Prozac at age 16 (\"never gave it a chance\"), Wellbutrin at age 16 (\"never gave it a chance\")    Medications:  Current Outpatient Medications   Medication Sig Dispense Refill   • VITAMIN D PO Take 2,000 Units by mouth every day.     • sertraline (ZOLOFT) 50 MG Tab Take 1 Tablet by mouth every day. 90 Tablet 0     No current facility-administered medications for this visit.       Review Of Systems:    Psychiatric - Negative for anxiety, depression, sleep problems    Physical Examination:  Vital signs: There were no vitals taken for this visit.    Musculoskeletal: No abnormal movements.     Mental " "Status Evaluation:   General: Young female, dressed in casual attire, good grooming and hygiene, in no apparent distress, calm and cooperative, good eye contact, psychomotor retardation  Orientation: Alert and oriented to person, place and time  Recent and remote memory: Grossly intact  Attention span and concentration: Grossly intact  Speech: Spontaneous, normal rate, rhythm and tone  Thought Process: Linear, logical and goal directed  Thought Content: Denies suicidal or homicidal ideations, intent or plan  Perception: No delusions noted  Associations: Intact  Language: Appropriate  Fund of knowledge and vocabulary: Grossly adequate  Mood: \"great\"  Affect: Euthymic, mood congruent  Insight: Good  Judgment: Good    Depression screening:  Depression Screen (PHQ-2/PHQ-9) 10/19/2020 10/20/2021 1/18/2022   PHQ-2 Total Score - - -   PHQ-2 Total Score 0 - -   PHQ-2 Total Score - 2 0   PHQ-9 Total Score - - -   PHQ-9 Total Score 0 - -   PHQ-9 Total Score - 3 -     Interpretation of PHQ-9 Total Score   Score Severity   1-4 No Depression   5-9 Mild Depression   10-14 Moderate Depression   15-19 Moderately Severe Depression   20-27 Severe Depression    Medical Records/Labs/Diagnostic Tests Reviewed:  [place] PDMP records - no prescribed controlled medications found in the last 2 years      Impression:  1.  Major depressive disorder, recurrent, in full remission - stable  2.  Anxiety disorder, unspecified type - stable  3.  Opioid use disorder, moderate, in sustained remission (last used heroin 7/2019 and Oxycodone 4/2021) - stable    Plan:  1.  Continue Zoloft 50 mg daily for depression and anxiety  2.  Continue to maintain sobriety from opioids and other illicit drugs  3.  Continue individual psychotherapy with CECI Hernandez     Return to clinic in 6 months or sooner if symptoms worsen    The proposed treatment plan was discussed with the patient who was provided the opportunity to ask questions and make suggestions " regarding alternative treatment. Patient verbalized understanding and expressed agreement with the plan.     Kira Onofre M.D.  07/14/22    This note was created using voice recognition software (Dragon). The accuracy of the dictation is limited by the abilities of the software. I have reviewed the note prior to signing, however some errors in grammar and context are still possible. If you have any questions related to this note please do not hesitate to contact our office.

## 2022-07-17 ENCOUNTER — PHARMACY VISIT (OUTPATIENT)
Dept: PHARMACY | Facility: MEDICAL CENTER | Age: 27
End: 2022-07-17
Payer: COMMERCIAL

## 2022-08-29 ENCOUNTER — HOSPITAL ENCOUNTER (OUTPATIENT)
Dept: LAB | Facility: MEDICAL CENTER | Age: 27
End: 2022-08-29
Attending: OBSTETRICS & GYNECOLOGY
Payer: COMMERCIAL

## 2022-08-29 LAB
25(OH)D3 SERPL-MCNC: 37 NG/ML (ref 30–100)
BASOPHILS # BLD AUTO: 0.9 % (ref 0–1.8)
BASOPHILS # BLD: 0.06 K/UL (ref 0–0.12)
EOSINOPHIL # BLD AUTO: 0.09 K/UL (ref 0–0.51)
EOSINOPHIL NFR BLD: 1.4 % (ref 0–6.9)
ERYTHROCYTE [DISTWIDTH] IN BLOOD BY AUTOMATED COUNT: 39.4 FL (ref 35.9–50)
EST. AVERAGE GLUCOSE BLD GHB EST-MCNC: 111 MG/DL
ESTRADIOL SERPL-MCNC: 38.9 PG/ML
FSH SERPL-ACNC: 6.3 MIU/ML
HBA1C MFR BLD: 5.5 % (ref 4–5.6)
HCT VFR BLD AUTO: 44.5 % (ref 37–47)
HGB BLD-MCNC: 14.2 G/DL (ref 12–16)
IMM GRANULOCYTES # BLD AUTO: 0.02 K/UL (ref 0–0.11)
IMM GRANULOCYTES NFR BLD AUTO: 0.3 % (ref 0–0.9)
LH SERPL-ACNC: 17.3 IU/L
LYMPHOCYTES # BLD AUTO: 2.81 K/UL (ref 1–4.8)
LYMPHOCYTES NFR BLD: 43.5 % (ref 22–41)
MCH RBC QN AUTO: 26.1 PG (ref 27–33)
MCHC RBC AUTO-ENTMCNC: 31.9 G/DL (ref 33.6–35)
MCV RBC AUTO: 81.8 FL (ref 81.4–97.8)
MONOCYTES # BLD AUTO: 0.58 K/UL (ref 0–0.85)
MONOCYTES NFR BLD AUTO: 9 % (ref 0–13.4)
NEUTROPHILS # BLD AUTO: 2.9 K/UL (ref 2–7.15)
NEUTROPHILS NFR BLD: 44.9 % (ref 44–72)
NRBC # BLD AUTO: 0 K/UL
NRBC BLD-RTO: 0 /100 WBC
PLATELET # BLD AUTO: 238 K/UL (ref 164–446)
PMV BLD AUTO: 11.1 FL (ref 9–12.9)
PROGEST SERPL-MCNC: 0.29 NG/ML
PROLACTIN SERPL-MCNC: 8.3 NG/ML (ref 2.8–26)
RBC # BLD AUTO: 5.44 M/UL (ref 4.2–5.4)
T4 FREE SERPL-MCNC: 1.18 NG/DL (ref 0.93–1.7)
TSH SERPL DL<=0.005 MIU/L-ACNC: 0.65 UIU/ML (ref 0.38–5.33)
WBC # BLD AUTO: 6.5 K/UL (ref 4.8–10.8)

## 2022-08-29 PROCEDURE — 82306 VITAMIN D 25 HYDROXY: CPT

## 2022-08-29 PROCEDURE — 83036 HEMOGLOBIN GLYCOSYLATED A1C: CPT

## 2022-08-29 PROCEDURE — 84402 ASSAY OF FREE TESTOSTERONE: CPT

## 2022-08-29 PROCEDURE — 84144 ASSAY OF PROGESTERONE: CPT

## 2022-08-29 PROCEDURE — 82670 ASSAY OF TOTAL ESTRADIOL: CPT

## 2022-08-29 PROCEDURE — 83001 ASSAY OF GONADOTROPIN (FSH): CPT

## 2022-08-29 PROCEDURE — 84443 ASSAY THYROID STIM HORMONE: CPT

## 2022-08-29 PROCEDURE — 85025 COMPLETE CBC W/AUTO DIFF WBC: CPT

## 2022-08-29 PROCEDURE — 84403 ASSAY OF TOTAL TESTOSTERONE: CPT

## 2022-08-29 PROCEDURE — 84146 ASSAY OF PROLACTIN: CPT

## 2022-08-29 PROCEDURE — 83002 ASSAY OF GONADOTROPIN (LH): CPT

## 2022-08-29 PROCEDURE — 84439 ASSAY OF FREE THYROXINE: CPT

## 2022-08-29 PROCEDURE — 84270 ASSAY OF SEX HORMONE GLOBUL: CPT

## 2022-08-29 PROCEDURE — 36415 COLL VENOUS BLD VENIPUNCTURE: CPT

## 2022-08-29 PROCEDURE — 83520 IMMUNOASSAY QUANT NOS NONAB: CPT

## 2022-09-01 LAB — MIS SERPL-MCNC: 9.56 NG/ML (ref 0.4–16.02)

## 2022-09-10 LAB
SHBG SERPL-SCNC: 33 NMOL/L (ref 25–122)
TESTOST FREE SERPL-MCNC: 12.8 PG/ML (ref 0.8–7.4)
TESTOST SERPL-MCNC: 76 NG/DL (ref 9–55)

## 2022-09-22 PROCEDURE — RXMED WILLOW AMBULATORY MEDICATION CHARGE: Performed by: OBSTETRICS & GYNECOLOGY

## 2022-09-29 ENCOUNTER — PHARMACY VISIT (OUTPATIENT)
Dept: PHARMACY | Facility: MEDICAL CENTER | Age: 27
End: 2022-09-29
Payer: COMMERCIAL

## 2022-10-08 ENCOUNTER — OFFICE VISIT (OUTPATIENT)
Dept: URGENT CARE | Facility: PHYSICIAN GROUP | Age: 27
End: 2022-10-08
Payer: COMMERCIAL

## 2022-10-08 VITALS
HEART RATE: 80 BPM | DIASTOLIC BLOOD PRESSURE: 82 MMHG | TEMPERATURE: 97 F | SYSTOLIC BLOOD PRESSURE: 102 MMHG | WEIGHT: 140 LBS | BODY MASS INDEX: 28.22 KG/M2 | OXYGEN SATURATION: 100 % | HEIGHT: 59 IN | RESPIRATION RATE: 20 BRPM

## 2022-10-08 DIAGNOSIS — H00.012 HORDEOLUM EXTERNUM OF RIGHT LOWER EYELID: ICD-10-CM

## 2022-10-08 PROCEDURE — 99213 OFFICE O/P EST LOW 20 MIN: CPT | Performed by: NURSE PRACTITIONER

## 2022-10-08 RX ORDER — POLYMYXIN B SULFATE AND TRIMETHOPRIM 1; 10000 MG/ML; [USP'U]/ML
1 SOLUTION OPHTHALMIC 4 TIMES DAILY
Qty: 10 ML | Refills: 0 | Status: SHIPPED | OUTPATIENT
Start: 2022-10-08 | End: 2022-10-15

## 2022-10-08 ASSESSMENT — ENCOUNTER SYMPTOMS
NECK PAIN: 0
DIARRHEA: 0
SORE THROAT: 0
EYE REDNESS: 1
ABDOMINAL PAIN: 0
DIZZINESS: 0
FEVER: 0
CONSTIPATION: 0
PHOTOPHOBIA: 0
DOUBLE VISION: 0
MYALGIAS: 0
BLURRED VISION: 0
CHILLS: 0
VOMITING: 0
WEAKNESS: 0
EYE PAIN: 0
COUGH: 0
HEADACHES: 0
WHEEZING: 0
EYE DISCHARGE: 0
SHORTNESS OF BREATH: 0
NAUSEA: 0

## 2022-10-08 ASSESSMENT — PAIN SCALES - GENERAL: PAINLEVEL: NO PAIN

## 2022-10-08 ASSESSMENT — FIBROSIS 4 INDEX: FIB4 SCORE: 0.56

## 2022-10-08 NOTE — PROGRESS NOTES
Subjective     Lucy Al is a 27 y.o. female who presents with Eye Problem (Per pt Right eye stye, last past 2-3 days)            Eye Problem   Associated symptoms include eye redness. Pertinent negatives include no blurred vision, eye discharge, double vision, fever, nausea, photophobia, vomiting or weakness. Experiencing right lower eyelid stye x2 days.  Started warm compress today.  No noted vision change, discharge or excessive tearing.  No noted contact lens wearing.  No sinus issues or recent upper respiratory infection.    PMH:  has a past medical history of Anxiety, Depression, Pericarditis, and Substance abuse ([facility]).  MEDS:   Current Outpatient Medications:     Cyanocobalamin (VITAMIN B 12 PO), Take  by mouth., Disp: , Rfl:     polymixin-trimethoprim (POLYTRIM) 36587-4.1 UNIT/ML-% Solution, Administer 1 Drop into the right eye 4 times a day for 7 days., Disp: 10 mL, Rfl: 0    drospirenone-ethinyl estradiol (AFRICA) 3-0.02 MG per tablet, 1 tablet Orally Once a day 84 days, Disp: 84 Tablet, Rfl: 4    sertraline (ZOLOFT) 50 MG Tab, Take 1 Tablet by mouth every day., Disp: 90 Tablet, Rfl: 1    VITAMIN D PO, Take 2,000 Units by mouth every day., Disp: , Rfl:   ALLERGIES: No Known Allergies  SURGHX: History reviewed. No pertinent surgical history.  SOCHX:  reports that she quit smoking about 3 years ago. Her smoking use included cigarettes. She started smoking about 12 years ago. She has a 4.50 pack-year smoking history. She has never used smokeless tobacco. She reports current alcohol use of about 2.4 oz per week. She reports current drug use. Drugs: Marijuana and Inhaled.  FH: Family history was reviewed, no pertinent findings to report      Review of Systems   Constitutional:  Negative for chills, fever and malaise/fatigue.   HENT:  Negative for congestion, ear pain and sore throat.    Eyes:  Positive for redness. Negative for blurred vision, double vision, photophobia, pain and discharge.        Stye  "left lower eyelid   Respiratory:  Negative for cough, shortness of breath and wheezing.    Gastrointestinal:  Negative for abdominal pain, constipation, diarrhea, nausea and vomiting.   Musculoskeletal:  Negative for myalgias and neck pain.   Skin:  Negative for itching and rash.   Neurological:  Negative for dizziness, weakness and headaches.   Endo/Heme/Allergies:  Negative for environmental allergies.   All other systems reviewed and are negative.           Objective     /82 (BP Location: Left arm, Patient Position: Sitting, BP Cuff Size: Adult)   Pulse 80   Temp 36.1 °C (97 °F) (Temporal)   Resp 20   Ht 1.499 m (4' 11\")   Wt 63.5 kg (140 lb)   SpO2 100%   BMI 28.28 kg/m²      Physical Exam  Vitals reviewed.   Constitutional:       General: She is awake. She is not in acute distress.     Appearance: Normal appearance. She is well-developed. She is not ill-appearing, toxic-appearing or diaphoretic.   HENT:      Head: Normocephalic.   Eyes:      General: Lids are normal.         Right eye: Hordeolum present. No foreign body or discharge.      Extraocular Movements: Extraocular movements intact.      Conjunctiva/sclera:      Right eye: Right conjunctiva is injected.      Pupils: Pupils are equal, round, and reactive to light.   Cardiovascular:      Rate and Rhythm: Normal rate.   Pulmonary:      Effort: Pulmonary effort is normal.   Musculoskeletal:         General: Normal range of motion.      Cervical back: Normal range of motion and neck supple.   Skin:     General: Skin is warm and dry.   Neurological:      Mental Status: She is alert and oriented to person, place, and time.   Psychiatric:         Attention and Perception: Attention normal.         Mood and Affect: Mood normal.         Speech: Speech normal.         Behavior: Behavior normal. Behavior is cooperative.                           Assessment & Plan        1. Hordeolum externum of right lower eyelid    - polymixin-trimethoprim (POLYTRIM) " 77854-4.1 UNIT/ML-% Solution; Administer 1 Drop into the right eye 4 times a day for 7 days.  Dispense: 10 mL; Refill: 0  -May use over the counter longer acting antihistamine (like Claritin/Zyrtec/Allegra without decongestant; chose one) x 7 days then as needed   -Recommend warm moist compress to right eye 2-3 times per day for approximately 6 to 8 minutes  -May use cool compresses for any periorbital eye swelling  -Avoid touching/rubbing eyes  -May clean eyes with mild dilute soap along eyelash line with eyes closed, rinse with plenty of water with any eye discharge  -May use saline eye rinse or eye lubricating solution as needed for eye dryness  -Monitor for increase in redness or swelling, vision change, eye pain, eye discharge, headache, dizziness- need re-evaluation

## 2022-10-11 ENCOUNTER — IMMUNIZATION (OUTPATIENT)
Dept: OCCUPATIONAL MEDICINE | Facility: CLINIC | Age: 27
End: 2022-10-11

## 2022-10-11 DIAGNOSIS — Z23 NEED FOR VACCINATION: Primary | ICD-10-CM

## 2022-10-11 PROCEDURE — 90686 IIV4 VACC NO PRSV 0.5 ML IM: CPT | Performed by: PREVENTIVE MEDICINE

## 2022-11-28 ENCOUNTER — PHARMACY VISIT (OUTPATIENT)
Dept: PHARMACY | Facility: MEDICAL CENTER | Age: 27
End: 2022-11-28
Payer: COMMERCIAL

## 2022-11-28 PROCEDURE — RXMED WILLOW AMBULATORY MEDICATION CHARGE: Performed by: PSYCHIATRY & NEUROLOGY

## 2022-12-08 PROCEDURE — RXMED WILLOW AMBULATORY MEDICATION CHARGE: Performed by: OBSTETRICS & GYNECOLOGY

## 2022-12-09 ENCOUNTER — PHARMACY VISIT (OUTPATIENT)
Dept: PHARMACY | Facility: MEDICAL CENTER | Age: 27
End: 2022-12-09
Payer: COMMERCIAL

## 2022-12-21 ENCOUNTER — PHARMACY VISIT (OUTPATIENT)
Dept: PHARMACY | Facility: MEDICAL CENTER | Age: 27
End: 2022-12-21
Payer: COMMERCIAL

## 2022-12-21 ENCOUNTER — OFFICE VISIT (OUTPATIENT)
Dept: URGENT CARE | Facility: PHYSICIAN GROUP | Age: 27
End: 2022-12-21
Payer: COMMERCIAL

## 2022-12-21 VITALS
BODY MASS INDEX: 29.43 KG/M2 | DIASTOLIC BLOOD PRESSURE: 68 MMHG | WEIGHT: 146 LBS | OXYGEN SATURATION: 97 % | RESPIRATION RATE: 18 BRPM | SYSTOLIC BLOOD PRESSURE: 122 MMHG | HEIGHT: 59 IN | HEART RATE: 100 BPM | TEMPERATURE: 99.7 F

## 2022-12-21 DIAGNOSIS — M54.6 ACUTE RIGHT-SIDED THORACIC BACK PAIN: ICD-10-CM

## 2022-12-21 DIAGNOSIS — M62.838 NECK MUSCLE SPASM: ICD-10-CM

## 2022-12-21 PROCEDURE — 99213 OFFICE O/P EST LOW 20 MIN: CPT | Performed by: STUDENT IN AN ORGANIZED HEALTH CARE EDUCATION/TRAINING PROGRAM

## 2022-12-21 PROCEDURE — RXMED WILLOW AMBULATORY MEDICATION CHARGE: Performed by: STUDENT IN AN ORGANIZED HEALTH CARE EDUCATION/TRAINING PROGRAM

## 2022-12-21 RX ORDER — METHYLPREDNISOLONE 4 MG/1
TABLET ORAL
Qty: 21 TABLET | Refills: 0 | Status: SHIPPED | OUTPATIENT
Start: 2022-12-21 | End: 2023-01-12

## 2022-12-21 RX ORDER — LIDOCAINE 50 MG/G
1 PATCH TOPICAL EVERY 24 HOURS
Qty: 10 PATCH | Refills: 0 | Status: SHIPPED | OUTPATIENT
Start: 2022-12-21 | End: 2023-01-12

## 2022-12-21 ASSESSMENT — ENCOUNTER SYMPTOMS
NECK PAIN: 1
WEAKNESS: 0
SYNCOPE: 0
PALPITATIONS: 0
ABDOMINAL PAIN: 0
PHOTOPHOBIA: 0
CHILLS: 0
LEG PAIN: 0
PARESIS: 0
BOWEL INCONTINENCE: 0
VISUAL CHANGE: 0
NUMBNESS: 0
HEADACHES: 0
TROUBLE SWALLOWING: 0
WHEEZING: 0
PARESTHESIAS: 0
BACK PAIN: 1
FEVER: 0
TINGLING: 0
PERIANAL NUMBNESS: 0
FOCAL WEAKNESS: 0
DIZZINESS: 0

## 2022-12-21 ASSESSMENT — FIBROSIS 4 INDEX: FIB4 SCORE: 0.56

## 2022-12-21 NOTE — PROGRESS NOTES
"Subjective     Lucy Al is a 27 y.o. female who presents with Back Pain (X1 day upper back pain. Pt. States she thinks she slept wrong.)            Back Pain  This is a new problem. The pain is present in the thoracic spine. The quality of the pain is described as cramping. The pain does not radiate. The pain is mild. Pertinent negatives include no abdominal pain, bladder incontinence, bowel incontinence, chest pain, dysuria, fever, headaches, leg pain, numbness, paresis, paresthesias, pelvic pain, perianal numbness, tingling or weakness. She has tried NSAIDs for the symptoms. The treatment provided mild relief.   Neck Pain   This is a new problem. The current episode started yesterday. The problem has been unchanged. The pain is associated with a sleep position. The pain is present in the right side. The quality of the pain is described as cramping. The pain is mild. The symptoms are aggravated by twisting. Pertinent negatives include no chest pain, fever, headaches, leg pain, numbness, pain with swallowing, paresis, photophobia, syncope, tingling, trouble swallowing, visual change or weakness. She has tried NSAIDs for the symptoms. The treatment provided mild relief.     Review of Systems   Constitutional:  Negative for chills, fever and malaise/fatigue.   HENT:  Negative for trouble swallowing.    Eyes:  Negative for photophobia.   Respiratory:  Negative for wheezing.    Cardiovascular:  Negative for chest pain, palpitations and syncope.   Gastrointestinal:  Negative for abdominal pain and bowel incontinence.   Genitourinary:  Negative for bladder incontinence, dysuria and pelvic pain.   Musculoskeletal:  Positive for back pain and neck pain.   Neurological:  Negative for dizziness, tingling, focal weakness, weakness, numbness, headaches and paresthesias.            Objective     /68   Pulse 100   Temp 37.6 °C (99.7 °F) (Temporal)   Resp 18   Ht 1.499 m (4' 11\")   Wt 66.2 kg (146 lb)   " SpO2 97%   BMI 29.49 kg/m²      Physical Exam  Vitals reviewed.   Constitutional:       General: She is not in acute distress.     Appearance: Normal appearance. She is not toxic-appearing.   HENT:      Head: Normocephalic and atraumatic.   Cardiovascular:      Rate and Rhythm: Normal rate.   Pulmonary:      Effort: Pulmonary effort is normal.   Musculoskeletal:         General: Normal range of motion.      Cervical back: Normal range of motion. Spasms present. No swelling, deformity, tenderness or bony tenderness. Normal range of motion.      Thoracic back: Tenderness present. No swelling, deformity or bony tenderness. Normal range of motion.      Lumbar back: Normal.      Comments: Upper and lower extremities with FROM.  Neurovascularly intact.  Gait intact.   Skin:     General: Skin is warm and dry.   Neurological:      General: No focal deficit present.      Mental Status: She is alert.                           Assessment & Plan        1. Acute right-sided thoracic back pain  - lidocaine (LIDODERM) 5 % Patch; Place 1 Patch on the skin every 24 hours.  Dispense: 10 Patch; Refill: 0  - methylPREDNISolone (MEDROL DOSEPAK) 4 MG Tablet Therapy Pack; Follow schedule on package instructions.  Dispense: 21 Tablet; Refill: 0    2. Neck muscle spasm  - lidocaine (LIDODERM) 5 % Patch; Place 1 Patch on the skin every 24 hours.  Dispense: 10 Patch; Refill: 0  - methylPREDNISolone (MEDROL DOSEPAK) 4 MG Tablet Therapy Pack; Follow schedule on package instructions.  Dispense: 21 Tablet; Refill: 0      Differential diagnoses, supportive care measures (rest, heat, OTC/ibuprofen, gentle ROM/stretching, topical NSAIDs, lidocaine patch) and indications for immediate follow-up discussed with patient. Pathogenesis of diagnosis discussed including typical length and natural progression. See AVS. patient advised to follow-up with PCP.  Work note provided per patient request.    Instructed to return to urgent care or nearest emergency  department if symptoms fail to improve, for any change in condition, further concerns, or new concerning symptoms.    Patient states understanding and agrees with the plan of care and discharge instructions.

## 2022-12-21 NOTE — LETTER
December 21, 2022    To Whom It May Concern:         This is confirmation that Lucy Al attended her scheduled appointment with Maylin Good P.A.-C. on 12/21/22.  Is excuse work absences today through 12/24/22 for medical reasons. Lucy can return to work without restrictions on 12/25/22 or earlier if symptoms have improved/resolved.         If you have any questions please do not hesitate to call me at the phone number listed below.    Sincerely,    Maylin Good P.A.-C.  [phone]

## 2022-12-29 ENCOUNTER — APPOINTMENT (OUTPATIENT)
Dept: DERMATOLOGY | Facility: IMAGING CENTER | Age: 27
End: 2022-12-29

## 2023-01-12 ENCOUNTER — TELEMEDICINE (OUTPATIENT)
Dept: BEHAVIORAL HEALTH | Facility: CLINIC | Age: 28
End: 2023-01-12
Payer: COMMERCIAL

## 2023-01-12 DIAGNOSIS — F11.21 OPIOID USE DISORDER, MODERATE, IN SUSTAINED REMISSION (HCC): ICD-10-CM

## 2023-01-12 DIAGNOSIS — F33.42 MDD (MAJOR DEPRESSIVE DISORDER), RECURRENT, IN FULL REMISSION (HCC): ICD-10-CM

## 2023-01-12 DIAGNOSIS — F41.9 ANXIETY DISORDER, UNSPECIFIED TYPE: ICD-10-CM

## 2023-01-12 PROCEDURE — 99214 OFFICE O/P EST MOD 30 MIN: CPT | Mod: 95 | Performed by: PSYCHIATRY & NEUROLOGY

## 2023-01-12 PROCEDURE — 96127 BRIEF EMOTIONAL/BEHAV ASSMT: CPT | Mod: 95 | Performed by: PSYCHIATRY & NEUROLOGY

## 2023-01-12 ASSESSMENT — PATIENT HEALTH QUESTIONNAIRE - PHQ9
5. POOR APPETITE OR OVEREATING: NOT AT ALL
7. TROUBLE CONCENTRATING ON THINGS, SUCH AS READING THE NEWSPAPER OR WATCHING TELEVISION: NOT AT ALL
1. LITTLE INTEREST OR PLEASURE IN DOING THINGS: NOT AT ALL
3. TROUBLE FALLING OR STAYING ASLEEP OR SLEEPING TOO MUCH: NOT AT ALL
9. THOUGHTS THAT YOU WOULD BE BETTER OFF DEAD, OR OF HURTING YOURSELF: NOT AT ALL
8. MOVING OR SPEAKING SO SLOWLY THAT OTHER PEOPLE COULD HAVE NOTICED. OR THE OPPOSITE, BEING SO FIGETY OR RESTLESS THAT YOU HAVE BEEN MOVING AROUND A LOT MORE THAN USUAL: NOT AT ALL
6. FEELING BAD ABOUT YOURSELF - OR THAT YOU ARE A FAILURE OR HAVE LET YOURSELF OR YOUR FAMILY DOWN: NOT AL ALL
SUM OF ALL RESPONSES TO PHQ QUESTIONS 1-9: 0
2. FEELING DOWN, DEPRESSED, IRRITABLE, OR HOPELESS: NOT AT ALL
SUM OF ALL RESPONSES TO PHQ9 QUESTIONS 1 AND 2: 0
4. FEELING TIRED OR HAVING LITTLE ENERGY: NOT AT ALL

## 2023-01-12 NOTE — PROGRESS NOTES
"PSYCHIATRY VIRTUAL VISIT FOLLOW-UP NOTE    Chief Complaint   Patient presents with    Follow-Up     Depression, anxiety     This evaluation was conducted via Zoom using secure and encrypted videoconferencing technology.   The patient was in their home in the [facility].    The patient's identity was confirmed and verbal consent was obtained for this virtual visit.    History Of Present Illness:  Lucy Al is a 27 y.o. female with major depressive disorder, anxiety disorder, opioid use disorder comes in today for follow up, was last seen 6 months ago.  She continues to do good in regards to her mental health, is compliant with Zoloft and is endorsing benefit.  She started working part-time which is going well.  She denies any relationship or psychosocial stressors.  She denies having thoughts of wanting to hurt herself.    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    Substance Use:  Alcohol - Infrequent, in social settings   Nicotine - Denies   Cannabis - Smokes cannabis few days a week  Illicit drugs - Denies     Past Medication Trials:  Prozac at age 16 (\"never gave it a chance\"), Wellbutrin at age 16 (\"never gave it a chance\")    Medications:  Current Outpatient Medications   Medication Sig Dispense Refill    lidocaine (LIDODERM) 5 % Patch Place 1 Patch on the skin every 24 hours. Leave on for 12 hours, off for 12 hours. 10 Patch 0    methylPREDNISolone (MEDROL DOSEPAK) 4 MG Tablet Therapy Pack Follow schedule on package instructions. 21 Tablet 0    drospirenone-ethinyl estradiol (AFRICA) 3-0.02 MG per tablet Take 1 tablet Orally Once a day 84 days 84 Tablet 4    sertraline (ZOLOFT) 50 MG Tab Take 1 Tablet by mouth every day. 90 Tablet 1    VITAMIN D PO Take 2,000 Units by mouth every day.       No current " "facility-administered medications for this visit.     Review Of Systems:    Psychiatric - Negative for anxiety, depression    Physical Examination:  Vital signs: There were no vitals taken for this visit.    Musculoskeletal: No abnormal movements.     Mental Status Evaluation:   General: Young female, dressed in casual attire, good grooming and hygiene, in no apparent distress, calm and cooperative, good eye contact, psychomotor retardation  Orientation: Alert and oriented to person, place and time  Recent and remote memory: Grossly intact  Attention span and concentration: Grossly intact  Speech: Spontaneous, normal rate, rhythm and tone  Thought Process: Linear, logical and goal directed  Thought Content: Denies suicidal or homicidal ideations, intent or plan  Perception: No delusions noted  Associations: Intact  Language: Appropriate  Fund of knowledge and vocabulary: Grossly adequate  Mood: \"great\"  Affect: Euthymic, mood congruent  Insight: Good  Judgment: Good    Depression screening:      10/20/2021 1/18/2022 1/12/2023   Depression Screen (PHQ-2/PHQ-9)   PHQ-2 Total Score   0   PHQ-2 Total Score 2 0    PHQ-9 Total Score   0   PHQ-9 Total Score 3         Multiple values from one day are sorted in reverse-chronological order     Interpretation of PHQ-9 Total Score   Score Severity   1-4 No Depression   5-9 Mild Depression   10-14 Moderate Depression   15-19 Moderately Severe Depression   20-27 Severe Depression    Medical Records/Labs/Diagnostic Tests Reviewed:  [place] PDMP records - no prescribed controlled medications found in the last 2 years      Impression:  1.  Major depressive disorder, recurrent, in full remission - stable  2.  Anxiety disorder, unspecified type - stable  3.  Opioid use disorder, moderate, in sustained remission (last used heroin 7/2019 and Oxycodone 4/2021) - stable    Plan:  1.  Continue Zoloft 50 mg daily for depression and anxiety  2.  Continue to maintain sobriety from opioids and " other illicit drugs  3.  Continue individual psychotherapy with CECI Hernandez     Return to clinic in 6 months or sooner if symptoms worsen    The proposed treatment plan was discussed with the patient who was provided the opportunity to ask questions and make suggestions regarding alternative treatment. Patient verbalized understanding and expressed agreement with the plan.     Kira Onofre M.D.  01/12/23    This note was created using voice recognition software (Dragon). The accuracy of the dictation is limited by the abilities of the software. I have reviewed the note prior to signing, however some errors in grammar and context are still possible. If you have any questions related to this note please do not hesitate to contact our office.

## 2023-01-27 ENCOUNTER — HOSPITAL ENCOUNTER (OUTPATIENT)
Dept: LAB | Facility: MEDICAL CENTER | Age: 28
End: 2023-01-27
Attending: OBSTETRICS & GYNECOLOGY
Payer: COMMERCIAL

## 2023-01-27 LAB
EST. AVERAGE GLUCOSE BLD GHB EST-MCNC: 108 MG/DL
ESTRADIOL SERPL-MCNC: 18.5 PG/ML
FSH SERPL-ACNC: 3 MIU/ML
HBA1C MFR BLD: 5.4 % (ref 4–5.6)
LH SERPL-ACNC: 3.8 IU/L

## 2023-01-27 PROCEDURE — 36415 COLL VENOUS BLD VENIPUNCTURE: CPT

## 2023-01-27 PROCEDURE — 83520 IMMUNOASSAY QUANT NOS NONAB: CPT

## 2023-01-27 PROCEDURE — 84402 ASSAY OF FREE TESTOSTERONE: CPT

## 2023-01-27 PROCEDURE — 83001 ASSAY OF GONADOTROPIN (FSH): CPT

## 2023-01-27 PROCEDURE — 84403 ASSAY OF TOTAL TESTOSTERONE: CPT

## 2023-01-27 PROCEDURE — 84270 ASSAY OF SEX HORMONE GLOBUL: CPT

## 2023-01-27 PROCEDURE — 83036 HEMOGLOBIN GLYCOSYLATED A1C: CPT

## 2023-01-27 PROCEDURE — 83002 ASSAY OF GONADOTROPIN (LH): CPT

## 2023-01-27 PROCEDURE — 82670 ASSAY OF TOTAL ESTRADIOL: CPT

## 2023-01-31 LAB — MIS SERPL-MCNC: 10.42 NG/ML (ref 0.4–16.02)

## 2023-02-02 LAB
SHBG SERPL-SCNC: 141 NMOL/L (ref 25–122)
TESTOST FREE SERPL-MCNC: 3.3 PG/ML (ref 0.8–7.4)
TESTOST SERPL-MCNC: 55 NG/DL (ref 9–55)

## 2023-02-05 ENCOUNTER — PHARMACY VISIT (OUTPATIENT)
Dept: PHARMACY | Facility: MEDICAL CENTER | Age: 28
End: 2023-02-05
Payer: COMMERCIAL

## 2023-02-05 PROCEDURE — RXMED WILLOW AMBULATORY MEDICATION CHARGE: Performed by: PSYCHIATRY & NEUROLOGY

## 2023-02-23 PROCEDURE — RXMED WILLOW AMBULATORY MEDICATION CHARGE: Performed by: OBSTETRICS & GYNECOLOGY

## 2023-03-03 ENCOUNTER — PHARMACY VISIT (OUTPATIENT)
Dept: PHARMACY | Facility: MEDICAL CENTER | Age: 28
End: 2023-03-03
Payer: COMMERCIAL

## 2023-03-15 ENCOUNTER — APPOINTMENT (OUTPATIENT)
Dept: DERMATOLOGY | Facility: IMAGING CENTER | Age: 28
End: 2023-03-15

## 2023-03-30 ENCOUNTER — APPOINTMENT (OUTPATIENT)
Dept: DERMATOLOGY | Facility: IMAGING CENTER | Age: 28
End: 2023-03-30

## 2023-04-26 ENCOUNTER — HOSPITAL ENCOUNTER (OUTPATIENT)
Facility: MEDICAL CENTER | Age: 28
End: 2023-04-26
Attending: OBSTETRICS & GYNECOLOGY
Payer: COMMERCIAL

## 2023-04-26 PROCEDURE — 87591 N.GONORRHOEAE DNA AMP PROB: CPT

## 2023-04-26 PROCEDURE — 88175 CYTOPATH C/V AUTO FLUID REDO: CPT

## 2023-04-26 PROCEDURE — 87661 TRICHOMONAS VAGINALIS AMPLIF: CPT

## 2023-04-26 PROCEDURE — 87491 CHLMYD TRACH DNA AMP PROBE: CPT

## 2023-04-27 LAB
C TRACH DNA GENITAL QL NAA+PROBE: NEGATIVE
CYTOLOGY REG CYTOL: NORMAL
N GONORRHOEA DNA GENITAL QL NAA+PROBE: NEGATIVE
SPECIMEN SOURCE: NORMAL

## 2023-04-28 LAB
SPEC CONTAINER SPEC: NORMAL
SPECIMEN SOURCE: NORMAL
T VAGINALIS RRNA SPEC QL NAA+PROBE: NEGATIVE

## 2023-05-15 PROCEDURE — RXMED WILLOW AMBULATORY MEDICATION CHARGE: Performed by: OBSTETRICS & GYNECOLOGY

## 2023-05-19 ENCOUNTER — PHARMACY VISIT (OUTPATIENT)
Dept: PHARMACY | Facility: MEDICAL CENTER | Age: 28
End: 2023-05-19
Payer: COMMERCIAL

## 2023-05-29 PROCEDURE — RXMED WILLOW AMBULATORY MEDICATION CHARGE: Performed by: PSYCHIATRY & NEUROLOGY

## 2023-05-30 ENCOUNTER — PHARMACY VISIT (OUTPATIENT)
Dept: PHARMACY | Facility: MEDICAL CENTER | Age: 28
End: 2023-05-30
Payer: COMMERCIAL

## 2023-06-15 ENCOUNTER — HOSPITAL ENCOUNTER (OUTPATIENT)
Dept: LAB | Facility: MEDICAL CENTER | Age: 28
End: 2023-06-15
Attending: NURSE PRACTITIONER
Payer: COMMERCIAL

## 2023-06-15 DIAGNOSIS — E66.3 OVERWEIGHT (BMI 25.0-29.9): ICD-10-CM

## 2023-06-15 DIAGNOSIS — E55.9 VITAMIN D DEFICIENCY: ICD-10-CM

## 2023-06-15 LAB
ALBUMIN SERPL BCP-MCNC: 4.3 G/DL (ref 3.2–4.9)
ALBUMIN/GLOB SERPL: 1.5 G/DL
ALP SERPL-CCNC: 83 U/L (ref 30–99)
ALT SERPL-CCNC: 24 U/L (ref 2–50)
ANION GAP SERPL CALC-SCNC: 14 MMOL/L (ref 7–16)
AST SERPL-CCNC: 22 U/L (ref 12–45)
BASOPHILS # BLD AUTO: 0.5 % (ref 0–1.8)
BASOPHILS # BLD: 0.04 K/UL (ref 0–0.12)
BILIRUB SERPL-MCNC: 0.4 MG/DL (ref 0.1–1.5)
BUN SERPL-MCNC: 9 MG/DL (ref 8–22)
CALCIUM ALBUM COR SERPL-MCNC: 9 MG/DL (ref 8.5–10.5)
CALCIUM SERPL-MCNC: 9.2 MG/DL (ref 8.5–10.5)
CHLORIDE SERPL-SCNC: 103 MMOL/L (ref 96–112)
CHOLEST SERPL-MCNC: 176 MG/DL (ref 100–199)
CO2 SERPL-SCNC: 20 MMOL/L (ref 20–33)
CREAT SERPL-MCNC: 0.61 MG/DL (ref 0.5–1.4)
EOSINOPHIL # BLD AUTO: 0.08 K/UL (ref 0–0.51)
EOSINOPHIL NFR BLD: 1 % (ref 0–6.9)
ERYTHROCYTE [DISTWIDTH] IN BLOOD BY AUTOMATED COUNT: 41 FL (ref 35.9–50)
GFR SERPLBLD CREATININE-BSD FMLA CKD-EPI: 125 ML/MIN/1.73 M 2
GLOBULIN SER CALC-MCNC: 2.8 G/DL (ref 1.9–3.5)
GLUCOSE SERPL-MCNC: 78 MG/DL (ref 65–99)
HCT VFR BLD AUTO: 43.3 % (ref 37–47)
HDLC SERPL-MCNC: 78 MG/DL
HGB BLD-MCNC: 13.8 G/DL (ref 12–16)
IMM GRANULOCYTES # BLD AUTO: 0.01 K/UL (ref 0–0.11)
IMM GRANULOCYTES NFR BLD AUTO: 0.1 % (ref 0–0.9)
LDLC SERPL CALC-MCNC: 80 MG/DL
LYMPHOCYTES # BLD AUTO: 3.83 K/UL (ref 1–4.8)
LYMPHOCYTES NFR BLD: 50 % (ref 22–41)
MCH RBC QN AUTO: 24 PG (ref 27–33)
MCHC RBC AUTO-ENTMCNC: 31.9 G/DL (ref 32.2–35.5)
MCV RBC AUTO: 75.4 FL (ref 81.4–97.8)
MONOCYTES # BLD AUTO: 0.61 K/UL (ref 0–0.85)
MONOCYTES NFR BLD AUTO: 8 % (ref 0–13.4)
NEUTROPHILS # BLD AUTO: 3.09 K/UL (ref 1.82–7.42)
NEUTROPHILS NFR BLD: 40.4 % (ref 44–72)
NRBC # BLD AUTO: 0 K/UL
NRBC BLD-RTO: 0 /100 WBC (ref 0–0.2)
PLATELET # BLD AUTO: 247 K/UL (ref 164–446)
PMV BLD AUTO: 10.8 FL (ref 9–12.9)
POTASSIUM SERPL-SCNC: 4.1 MMOL/L (ref 3.6–5.5)
PROT SERPL-MCNC: 7.1 G/DL (ref 6–8.2)
RBC # BLD AUTO: 5.74 M/UL (ref 4.2–5.4)
SODIUM SERPL-SCNC: 137 MMOL/L (ref 135–145)
TRIGL SERPL-MCNC: 88 MG/DL (ref 0–149)
WBC # BLD AUTO: 7.7 K/UL (ref 4.8–10.8)

## 2023-06-15 PROCEDURE — 80053 COMPREHEN METABOLIC PANEL: CPT

## 2023-06-15 PROCEDURE — 84439 ASSAY OF FREE THYROXINE: CPT

## 2023-06-15 PROCEDURE — 80061 LIPID PANEL: CPT

## 2023-06-15 PROCEDURE — 85025 COMPLETE CBC W/AUTO DIFF WBC: CPT

## 2023-06-15 PROCEDURE — 82306 VITAMIN D 25 HYDROXY: CPT

## 2023-06-15 PROCEDURE — 84443 ASSAY THYROID STIM HORMONE: CPT

## 2023-06-15 PROCEDURE — 36415 COLL VENOUS BLD VENIPUNCTURE: CPT

## 2023-06-16 LAB
25(OH)D3 SERPL-MCNC: 44 NG/ML (ref 30–100)
T4 FREE SERPL-MCNC: 1.19 NG/DL (ref 0.93–1.7)
TSH SERPL DL<=0.005 MIU/L-ACNC: 0.35 UIU/ML (ref 0.38–5.33)

## 2023-07-01 ENCOUNTER — TELEPHONE (OUTPATIENT)
Dept: URGENT CARE | Facility: PHYSICIAN GROUP | Age: 28
End: 2023-07-01
Payer: COMMERCIAL

## 2023-07-01 NOTE — LETTER
7/1/2023            Lucy Al  [address] [facility] [place] [address]              Dear Lucy,    Your care is very important to us, and we have noticed that on 06/27/2023, you missed your appointment with Piper COVINGTON. at [facility]       We’re committed to providing you with the best care possible. Your appointment time is reserved for you and your provider to discuss any current or new health concerns and, together, determine the best plan of care for you. Please call [phone] to reschedule at your earliest convenience.        In some cases, [facility] offers additional resources to make your healthcare more accessible, including transportation assistance, financial assistance and virtual visits. To learn more about these resources, please call [phone].       In order to keep you as informed as possible, below is a brief summary of our policy regarding missed appointments:        If a patient “No Shows”??three (3) or more appointments within a rolling 12-month           period, they may be dismissed from the practice for failure to follow clinician      recommendations.     If you have any concerns regarding the care you are receiving, please talk with your provider or call the office at [phone] and request to speak with the Practice . We’re committed to providing excellent care, and your feedback is invaluable.          Sincerely,  Piper COVINGTON

## 2023-07-07 ENCOUNTER — TELEMEDICINE (OUTPATIENT)
Dept: BEHAVIORAL HEALTH | Facility: CLINIC | Age: 28
End: 2023-07-07
Payer: COMMERCIAL

## 2023-07-07 DIAGNOSIS — F33.42 MDD (MAJOR DEPRESSIVE DISORDER), RECURRENT, IN FULL REMISSION (HCC): ICD-10-CM

## 2023-07-07 DIAGNOSIS — F41.9 ANXIETY DISORDER, UNSPECIFIED TYPE: ICD-10-CM

## 2023-07-07 DIAGNOSIS — F11.21 OPIOID USE DISORDER, MODERATE, IN SUSTAINED REMISSION (HCC): ICD-10-CM

## 2023-07-07 PROCEDURE — 90833 PSYTX W PT W E/M 30 MIN: CPT | Mod: 95 | Performed by: PSYCHIATRY & NEUROLOGY

## 2023-07-07 PROCEDURE — 99214 OFFICE O/P EST MOD 30 MIN: CPT | Mod: 95 | Performed by: PSYCHIATRY & NEUROLOGY

## 2023-07-07 NOTE — PROGRESS NOTES
"PSYCHIATRY VIRTUAL VISIT FOLLOW-UP NOTE    Chief Complaint   Patient presents with    Follow-Up     Depression, anxiety     This evaluation was conducted via Zoom using secure and encrypted videoconferencing technology.   The patient was in their home in the [facility].    The patient's identity was confirmed and verbal consent was obtained for this virtual visit.    History Of Present Illness:  Lucy Al is a 27 y.o. female with major depressive disorder, anxiety disorder, opioid use disorder comes in today for follow up, was last seen 6 months ago.  She has been doing all right in regards to her anxiety and depression.  She continues to report good compliance with Zoloft and is denying any side effects.  She recently switched units and is now working full-time as a  in the ICU and this transition has been going well.  She continues to focus on herself and does not want to be in a relationship.  She has had some contact with her ex-boyfriend and has been tending boundaries with him.  She denies having thoughts of wanting to hurt herself.    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    Substance Use:  Alcohol - Infrequent, in social settings   Nicotine - Denies   Cannabis - Smokes THC daily  Illicit drugs - Denies     Past Medication Trials:  Prozac at age 16 (\"never gave it a chance\"), Wellbutrin at age 16 (\"never gave it a chance\")    Medications:  Current Outpatient Medications   Medication Sig Dispense Refill    sertraline (ZOLOFT) 50 MG Tab Take 1 Tablet by mouth every day. 90 Tablet 1    drospirenone-ethinyl estradiol (AFRICA) 3-0.02 MG per tablet Take 1 tablet Orally Once a day for 84 days 84 Tablet 4    VITAMIN D PO Take 2,000 Units by mouth every day.       No current facility-administered medications for this visit.     Review Of Systems:  " "  Psychiatric - Negative for anxiety, depression    Physical Examination:  Vital signs: There were no vitals taken for this visit.    Musculoskeletal: No abnormal movements.     Mental Status Evaluation:   General: Young female, dressed in casual attire, good grooming and hygiene, in no apparent distress, calm and cooperative, good eye contact, psychomotor retardation  Orientation: Alert and oriented to person, place and time  Recent and remote memory: Grossly intact  Attention span and concentration: Grossly intact  Speech: Spontaneous, normal rate, rhythm and tone  Thought Process: Linear, logical and goal directed  Thought Content: Denies suicidal or homicidal ideations, intent or plan  Perception: No delusions noted  Associations: Intact  Language: Appropriate  Fund of knowledge and vocabulary: Grossly adequate  Mood: \"good\"  Affect: Euthymic, mood congruent  Insight: Good  Judgment: Good    Depression screening:      10/20/2021    11:30 AM 1/18/2022    11:30 AM 1/12/2023    11:04 AM   Depression Screen (PHQ-2/PHQ-9)   PHQ-2 Total Score   0   PHQ-2 Total Score 2 0    PHQ-9 Total Score   0   PHQ-9 Total Score 3       Interpretation of PHQ-9 Total Score   Score Severity   1-4 No Depression   5-9 Mild Depression   10-14 Moderate Depression   15-19 Moderately Severe Depression   20-27 Severe Depression    Medical Records/Labs/Diagnostic Tests Reviewed:  [place] PDMP records - no prescribed controlled medications found in the last 2 years      Impression:  1.  Major depressive disorder, recurrent, in full remission - stable  2.  Anxiety disorder, unspecified type - stable  3.  Opioid use disorder, moderate, in sustained remission (last used heroin 7/2019 and Oxycodone 4/2021) - stable    Plan:  1.  Continue Zoloft 50 mg daily for depression and anxiety  2.  Continue to maintain sobriety from opioids and other illicit drugs  3.  Continue individual psychotherapy with CECI Hernandez   4.  Provided supportive " psychotherapy (> 16 minutes): Validation in regards to how the last relationship affected her, encouraged her to continue maintaining boundaries and to keep a constant reminder on why that relationship ended and why it is not good for her, advised continued self-care.    Return to clinic in 6 months or sooner if symptoms worsen    The proposed treatment plan was discussed with the patient who was provided the opportunity to ask questions and make suggestions regarding alternative treatment. Patient verbalized understanding and expressed agreement with the plan.     Kira Onofre M.D.  07/07/23    This note was created using voice recognition software (Dragon). The accuracy of the dictation is limited by the abilities of the software. I have reviewed the note prior to signing, however some errors in grammar and context are still possible. If you have any questions related to this note please do not hesitate to contact our office.

## 2023-07-11 ENCOUNTER — OFFICE VISIT (OUTPATIENT)
Dept: MEDICAL GROUP | Facility: PHYSICIAN GROUP | Age: 28
End: 2023-07-11
Payer: COMMERCIAL

## 2023-07-11 VITALS
DIASTOLIC BLOOD PRESSURE: 58 MMHG | HEART RATE: 78 BPM | BODY MASS INDEX: 28.43 KG/M2 | WEIGHT: 141 LBS | SYSTOLIC BLOOD PRESSURE: 80 MMHG | TEMPERATURE: 98.1 F | OXYGEN SATURATION: 97 % | HEIGHT: 59 IN

## 2023-07-11 DIAGNOSIS — Z11.4 SCREENING FOR HIV WITHOUT PRESENCE OF RISK FACTORS: ICD-10-CM

## 2023-07-11 DIAGNOSIS — R79.89 ABNORMAL THYROID BLOOD TEST: ICD-10-CM

## 2023-07-11 DIAGNOSIS — Z11.59 NEED FOR HEPATITIS C SCREENING TEST: ICD-10-CM

## 2023-07-11 DIAGNOSIS — Z00.00 ENCOUNTER FOR WELL ADULT EXAM WITHOUT ABNORMAL FINDINGS: ICD-10-CM

## 2023-07-11 PROCEDURE — 99395 PREV VISIT EST AGE 18-39: CPT | Performed by: NURSE PRACTITIONER

## 2023-07-11 PROCEDURE — 3078F DIAST BP <80 MM HG: CPT | Performed by: NURSE PRACTITIONER

## 2023-07-11 PROCEDURE — 3074F SYST BP LT 130 MM HG: CPT | Performed by: NURSE PRACTITIONER

## 2023-07-11 ASSESSMENT — FIBROSIS 4 INDEX: FIB4 SCORE: 0.49

## 2023-07-11 NOTE — PROGRESS NOTES
Subjective:   CC:   Chief Complaint   Patient presents with    Annual Exam    Lab Results     HPI:   Lucy Al is a 27 y.o. female who presents for annual exam:     Anticipatory Guidance:  Cholesterol screenin/15/2023   LDL controlled: 80  Diabetes screenin/15/2023  Diet: Recommend more lean meats, fruits, vegetables, whole grains. Trying to decrease sugar.  Exercise: Encourage regular exercise. Tries 2-3 times a week, uses the treadmill, weight lifting and strengthening.  Substance abuse: Marijuana, trying to decrease.  Safe in relationship: NA  Seatbelts, bike/motorcycle helmet: Yes  Sun protection: Yes  Dentist: Up to date, every 6 months  Eye doctor: up to date, yearly    Cancer Screening:  Colorectal cancer screening: N/A, due at age 45  Cervical cancer screenin2023  Breast cancer screening: N/A, due at age 40-maternal aunt with breast cancer.    Infectious Disease Screening/Immunizations:  STI screening: Declines  Chlamydia/Gonorrhea screenin2023  Hep C screening: Ordered today  HIV screen: Ordered today  Practices safe sex: Not currently sexually active    Immunizations:   Influenza: 10/11/2022   Tetanus: 2019   Pneumonia: N/A   Received HPV series: Aged out    Preventative Care Screening:   Osteoporosis Screening: N/A, due at age 65  Tobacco Screening: Former smoker  AAA Screening: N/A    Patient's last menstrual period was 2023 (exact date).  Hx STDs: No  Birth control: birth control pill  Menses every month with 4-7 days with moderate bleeding.  Reports mild cramping and does take OTC analgesics for cramps.  No significant bloating/fluid retention, pelvic pain, or dyspareunia. No abnormal vaginal discharge.  No breast tenderness, mass, nipple discharge, changes in size or contour, or abnormal cyclic discomfort.    OB History    Para Term  AB Living   0 0 0 0 0 0   SAB IAB Ectopic Molar Multiple Live Births   0 0 0 0 0 0     She  reports that she  is not currently sexually active and has had partner(s) who are male. She reports using the following methods of birth control/protection: Condom and Pill.  She  has a past medical history of Anxiety, Depression, Pericarditis, and Substance abuse ([facility]).  She  has no past surgical history on file.    Family History   Problem Relation Age of Onset    Hypertension Mother     Alcohol/Drug Father         alcoholism    Psychiatric Illness Father         bipolar disorder    Bipolar disorder Father     Drug abuse Father         Stopped using drugs since 2015.    Alcohol abuse Father         Stopped drinking since 2015    No Known Problems Sister     No Known Problems Brother     Cancer Maternal Aunt     Breast Cancer Maternal Aunt     Diabetes Maternal Uncle     Arthritis Maternal Grandmother     Heart Attack Maternal Grandfather      Social History     Tobacco Use    Smoking status: Former     Packs/day: 0.00     Years: 10.00     Pack years: 0.00     Types: Cigarettes     Start date: 2009     Quit date: 2019     Years since quittin.0    Smokeless tobacco: Never    Tobacco comments:     Started smoking at 14. Stopped at 24.   Vaping Use    Vaping Use: Never used   Substance Use Topics    Alcohol use: Not Currently     Comment: rarely    Drug use: Yes     Types: Marijuana, Inhaled     Comment: No heroin use in 3 years. 4-5 cartrages in a month     Patient Active Problem List    Diagnosis Date Noted    Overweight (BMI 25.0-29.9) 2022    Amenorrhea 2022    MDD (major depressive disorder), recurrent, in full remission (HCC) 10/20/2021    Congenital hand deformity 2020    Opioid use disorder, moderate, in sustained remission (HCC) 2019    Anxiety disorder 2019    Vitamin D deficiency 2017     Current Outpatient Medications   Medication Sig Dispense Refill    sertraline (ZOLOFT) 50 MG Tab Take 1 Tablet by mouth every day. 90 Tablet 1    drospirenone-ethinyl estradiol  "(AFRICA) 3-0.02 MG per tablet Take 1 tablet Orally Once a day for 84 days 84 Tablet 4    VITAMIN D PO Take 2,000 Units by mouth every day.       No current facility-administered medications for this visit.     No Known Allergies  Review of Systems   Constitutional: Negative for fever, chills and malaise/fatigue.   HENT: Negative for congestion.    Eyes: Negative for pain.   Respiratory: Negative for cough and shortness of breath.    Cardiovascular: Negative for chest pain and leg swelling.   Gastrointestinal: Negative for nausea, vomiting, abdominal pain and diarrhea.   Genitourinary: Negative for dysuria and hematuria.   Skin: Negative for rash.   Neurological: Negative for dizziness, focal weakness and headaches.   Endo/Heme/Allergies: Does not bruise/bleed easily.   Psychiatric/Behavioral: Negative for depression.  The patient is not nervous/anxious.      Objective:   BP (!) 80/58 (BP Location: Right arm, Patient Position: Sitting, BP Cuff Size: Adult)   Pulse 78   Temp 36.7 °C (98.1 °F) (Temporal)   Ht 1.499 m (4' 11\")   Wt 64 kg (141 lb)   LMP 07/09/2023 (Exact Date) Comment: Currently on period  SpO2 97%   BMI 28.48 kg/m²     Wt Readings from Last 4 Encounters:   07/11/23 64 kg (141 lb)   12/21/22 66.2 kg (146 lb)   10/08/22 63.5 kg (140 lb)   06/20/22 62.6 kg (138 lb)     Physical Exam:  Constitutional: Well-developed and well-nourished. Not diaphoretic. No distress.   Skin: Skin is warm and dry. No rash noted.  Head: Atraumatic without lesions.  Eyes: Conjunctivae and extraocular motions are normal. Pupils are equal, round, and reactive to light. No scleral icterus.   Ears:  External ears unremarkable. Tympanic membranes clear and intact.  Nose: Nares patent. Septum midline. Turbinates without erythema nor edema. No discharge.   Mouth/Throat: Tongue normal. Oropharynx is clear and moist. Posterior pharynx without erythema or exudates.  Neck: Supple, trachea midline. Normal range of motion. No thyromegaly " present. No lymphadenopathy--cervical or supraclavicular.  Cardiovascular: Regular rate and rhythm, S1 and S2 without murmur, rubs, or gallops.    Respiratory: Effort normal. Clear to auscultation throughout. No adventitious sounds.   Breast:  Breast exam deferred. Discussed monthly self exams and what to look for, including peau d'orange or nipple retraction, discharge, breasts moving freely and equally without restriction, axillary/supraclavicular adenopathy, or palpable masses/nodules.  Abdomen: Soft, non tender, and without distention. Active bowel sounds in all four quadrants. No rebound, guarding.  Extremities: No cyanosis, clubbing, erythema, nor edema. Radial pulses intact and symmetric.   Musculoskeletal: All major joints AROM full in all directions without pain.  Neurological: Alert and oriented x 3. Grossly non-focal. Strength and sensation grossly intact.   Psychiatric:  Behavior, mood, and affect are appropriate.    Assessment and Plan:   1. Encounter for well adult exam without abnormal findings    2. Abnormal thyroid blood test  New to examiner and patient, noted on labs completed in June 2023.  Plan to have patient repeat T3, T4, TSH, will notify her of results through [employer] when received.  - T3 FREE; Future  - FREE THYROXINE; Future  - TSH; Future    3. Screening for HIV without presence of risk factors  Due for one-time screening.  - HIV AG/AB COMBO ASSAY SCREENING; Future    4. Need for hepatitis C screening test  Due for one-time screening.  - HEP C VIRUS ANTIBODY; Future     Health maintenance: Up-to-date  Labs per orders  Immunizations: Up-to-date  Patient counseled about skin care, diet, supplements, and exercise.  Discussed  breast self exam, mammography screening, STD prevention, HIV risk factors and prevention, feminine hygiene, use and side effects of OCP's, menopause, osteoporosis, adequate intake of calcium and vitamin D, diet and exercise, colorectal cancer screening     Follow-up:  Return in about 1 year (around 7/11/2024) for Preventative Annual, Follow up Labs.     Please note that this dictation was created using voice recognition software. I have worked with consultants from the vendor as well as technical experts from [facility] to optimize the interface. I have made every reasonable attempt to correct obvious errors, but I expect that there are errors of grammar and possibly content that I did not discover before finalizing the note.

## 2023-08-14 ENCOUNTER — PHARMACY VISIT (OUTPATIENT)
Dept: PHARMACY | Facility: MEDICAL CENTER | Age: 28
End: 2023-08-14
Payer: COMMERCIAL

## 2023-08-14 PROCEDURE — RXMED WILLOW AMBULATORY MEDICATION CHARGE: Performed by: OBSTETRICS & GYNECOLOGY

## 2023-08-17 ENCOUNTER — HOSPITAL ENCOUNTER (OUTPATIENT)
Dept: LAB | Facility: MEDICAL CENTER | Age: 28
End: 2023-08-17
Attending: NURSE PRACTITIONER
Payer: COMMERCIAL

## 2023-08-17 DIAGNOSIS — Z11.4 SCREENING FOR HIV WITHOUT PRESENCE OF RISK FACTORS: ICD-10-CM

## 2023-08-17 DIAGNOSIS — Z11.59 NEED FOR HEPATITIS C SCREENING TEST: ICD-10-CM

## 2023-09-01 ENCOUNTER — HOSPITAL ENCOUNTER (OUTPATIENT)
Dept: LAB | Facility: MEDICAL CENTER | Age: 28
End: 2023-09-01
Attending: NURSE PRACTITIONER
Payer: COMMERCIAL

## 2023-09-01 DIAGNOSIS — R79.89 ABNORMAL THYROID BLOOD TEST: ICD-10-CM

## 2023-09-01 LAB
HCV AB SER QL: NORMAL
T3FREE SERPL-MCNC: 3.76 PG/ML (ref 2–4.4)
T4 FREE SERPL-MCNC: 1.36 NG/DL (ref 0.93–1.7)
TSH SERPL DL<=0.005 MIU/L-ACNC: 0.72 UIU/ML (ref 0.38–5.33)

## 2023-09-01 PROCEDURE — 84439 ASSAY OF FREE THYROXINE: CPT

## 2023-09-01 PROCEDURE — 36415 COLL VENOUS BLD VENIPUNCTURE: CPT

## 2023-09-01 PROCEDURE — 84443 ASSAY THYROID STIM HORMONE: CPT

## 2023-09-01 PROCEDURE — 86803 HEPATITIS C AB TEST: CPT

## 2023-09-01 PROCEDURE — 84481 FREE ASSAY (FT-3): CPT

## 2023-09-18 PROCEDURE — RXMED WILLOW AMBULATORY MEDICATION CHARGE: Performed by: PSYCHIATRY & NEUROLOGY

## 2023-09-20 ENCOUNTER — PHARMACY VISIT (OUTPATIENT)
Dept: PHARMACY | Facility: MEDICAL CENTER | Age: 28
End: 2023-09-20
Payer: COMMERCIAL

## 2023-09-20 ENCOUNTER — OFFICE VISIT (OUTPATIENT)
Dept: MEDICAL GROUP | Facility: PHYSICIAN GROUP | Age: 28
End: 2023-09-20
Payer: COMMERCIAL

## 2023-09-20 VITALS
RESPIRATION RATE: 14 BRPM | DIASTOLIC BLOOD PRESSURE: 66 MMHG | HEART RATE: 70 BPM | SYSTOLIC BLOOD PRESSURE: 102 MMHG | HEIGHT: 59 IN | WEIGHT: 134 LBS | BODY MASS INDEX: 27.01 KG/M2 | OXYGEN SATURATION: 98 % | TEMPERATURE: 98.2 F

## 2023-09-20 DIAGNOSIS — R20.2 NUMBNESS AND TINGLING OF RIGHT ARM: ICD-10-CM

## 2023-09-20 DIAGNOSIS — Z23 NEED FOR VACCINATION: ICD-10-CM

## 2023-09-20 DIAGNOSIS — E66.3 OVERWEIGHT (BMI 25.0-29.9): ICD-10-CM

## 2023-09-20 DIAGNOSIS — R20.0 NUMBNESS AND TINGLING OF RIGHT ARM: ICD-10-CM

## 2023-09-20 DIAGNOSIS — R79.89 ELEVATED TSH: ICD-10-CM

## 2023-09-20 PROCEDURE — 90686 IIV4 VACC NO PRSV 0.5 ML IM: CPT | Performed by: NURSE PRACTITIONER

## 2023-09-20 PROCEDURE — 99213 OFFICE O/P EST LOW 20 MIN: CPT | Mod: 25 | Performed by: NURSE PRACTITIONER

## 2023-09-20 PROCEDURE — 3078F DIAST BP <80 MM HG: CPT | Performed by: NURSE PRACTITIONER

## 2023-09-20 PROCEDURE — 3074F SYST BP LT 130 MM HG: CPT | Performed by: NURSE PRACTITIONER

## 2023-09-20 PROCEDURE — 90471 IMMUNIZATION ADMIN: CPT | Performed by: NURSE PRACTITIONER

## 2023-09-20 ASSESSMENT — FIBROSIS 4 INDEX: FIB4 SCORE: 0.51

## 2023-09-20 NOTE — ASSESSMENT & PLAN NOTE
New to examiner. Patient reports that she is having intermittent numbness and tingling in her right upper extremity. Describes that it feels like the arm is heavy and it wants to go down. Usually occurs when she wakes up and sometimes it is random. Has not tried any interventions.

## 2023-09-25 PROBLEM — R20.0 NUMBNESS AND TINGLING OF RIGHT ARM: Status: ACTIVE | Noted: 2023-09-25

## 2023-09-25 PROBLEM — R20.2 NUMBNESS AND TINGLING OF RIGHT ARM: Status: ACTIVE | Noted: 2023-09-25

## 2023-09-25 NOTE — PROGRESS NOTES
"CC: Diagnoses of Elevated TSH, Overweight (BMI 25.0-29.9), Numbness and tingling of right arm, and Need for vaccination were pertinent to this visit.                                                                                                                                       HPI:   Lucy presents today with the following concerns:    Overweight (BMI 25.0-29.9)  New to examiner. Patient reports that she is having intermittent numbness and tingling in her right upper extremity. Describes that it feels like the arm is heavy and it wants to go down. Usually occurs when she wakes up and sometimes it is random. Has not tried any interventions.    Patient Active Problem List    Diagnosis Date Noted    Numbness and tingling of right arm 09/25/2023    Overweight (BMI 25.0-29.9) 06/01/2022    Amenorrhea 06/01/2022    MDD (major depressive disorder), recurrent, in full remission (HCC) 10/20/2021    Congenital hand deformity 02/03/2020    Opioid use disorder, moderate, in sustained remission (HCC) 07/12/2019    Anxiety disorder 07/01/2019    Vitamin D deficiency 01/13/2017     Current Outpatient Medications   Medication Sig Dispense Refill    sertraline (ZOLOFT) 50 MG Tab Take 1 Tablet by mouth every day. 90 Tablet 1    drospirenone-ethinyl estradiol (AFRICA) 3-0.02 MG per tablet Take 1 tablet Orally Once a day for 84 days 84 Tablet 4    VITAMIN D PO Take 2,000 Units by mouth every day.       No current facility-administered medications for this visit.     Allergies as of 09/20/2023    (No Known Allergies)      ROS:  See HPI    /66   Pulse 70   Temp 36.8 °C (98.2 °F) (Temporal)   Resp 14   Ht 1.499 m (4' 11\")   Wt 60.8 kg (134 lb)   SpO2 98%   BMI 27.06 kg/m²     Physical Exam:  General: Well nourished, well developed female in NAD, awake and conversant.  Eyes: Normal conjunctiva, anicteric.  Round symmetrical pupils.  ENT: Hearing grossly intact.  No nasal discharge.  Neck: Neck is supple.  No masses or " thyromegaly.  CV: No lower extremity edema.  Respiratory: Respirations are nonlabored.  No wheezing.  Abdomen: Non-Distended.  Skin: Warm.  No rashes or ulcers.  MSK: Normal ambulation.  No clubbing or cyanosis.  Neuro: Sensation and CN II-XII grossly normal.  Psych: Alert and oriented.  Cooperative, appropriate mood and affect, normal judgment.      Assessment and Plan.   28 y.o. female with the following issues:  1. Elevated TSH  Stable without medications. Repeat thyroid labs are now all in normal range compared to June 2023. Plan to repeat labs in June 2024.    2. Overweight (BMI 25.0-29.9)  Encourage diet high in fruits, vegetables, and fiber. And a diet low in salt, refined carbohydrates, cholesterol, saturated fat, and trans fatty acids.    Encourage a minimum of 30 minutes of moderate intensity aerobic exercise (eg, brisk walking) is recommended on five days each week. Or 30 minutes of vigorous-intensity aerobic exercise (eg, jogging) on three days each week.   Patient's body mass index is 27.06 kg/m². Exercise and nutrition counseling were performed at this visit.  Due for updated labs in June 2024.      3. Numbness and tingling of right arm  New to examiner, ongoing for patient. Encouraged her to track triggers and alleviating factors. Recommend brace to be worn as often as possible. Come back to be seen if symptoms persist or worsen.    4. Need for vaccination  Given today  - Influenza Vaccine Quad Injection (PF)     I have placed the below orders and discussed them with an approved delegating provider. The MA is performing the below orders under the direction of Dr. Dill.      Return in about 10 months (around 7/11/2024) for Preventative Annual, Follow up Labs.     Please note that this dictation was created using voice recognition software. I have worked with consultants from the vendor as well as technical experts from [employer] to optimize the interface. I have made every reasonable attempt to  correct obvious errors, but I expect that there are errors of grammar and possibly content that I did not discover before finalizing the note.

## 2023-10-16 ENCOUNTER — PHARMACY VISIT (OUTPATIENT)
Dept: PHARMACY | Facility: MEDICAL CENTER | Age: 28
End: 2023-10-16
Payer: COMMERCIAL

## 2023-10-16 PROCEDURE — RXMED WILLOW AMBULATORY MEDICATION CHARGE: Performed by: OBSTETRICS & GYNECOLOGY

## 2023-12-16 PROCEDURE — RXMED WILLOW AMBULATORY MEDICATION CHARGE: Performed by: PSYCHIATRY & NEUROLOGY

## 2023-12-17 ENCOUNTER — PHARMACY VISIT (OUTPATIENT)
Dept: PHARMACY | Facility: MEDICAL CENTER | Age: 28
End: 2023-12-17
Payer: COMMERCIAL

## 2024-01-05 ENCOUNTER — TELEMEDICINE (OUTPATIENT)
Dept: BEHAVIORAL HEALTH | Facility: CLINIC | Age: 29
End: 2024-01-05
Payer: COMMERCIAL

## 2024-01-05 DIAGNOSIS — F11.21 OPIOID USE DISORDER, MODERATE, IN SUSTAINED REMISSION (HCC): ICD-10-CM

## 2024-01-05 DIAGNOSIS — F33.42 MDD (MAJOR DEPRESSIVE DISORDER), RECURRENT, IN FULL REMISSION (HCC): ICD-10-CM

## 2024-01-05 DIAGNOSIS — F41.9 ANXIETY DISORDER, UNSPECIFIED TYPE: ICD-10-CM

## 2024-01-05 PROCEDURE — 99214 OFFICE O/P EST MOD 30 MIN: CPT | Mod: 95 | Performed by: PSYCHIATRY & NEUROLOGY

## 2024-01-05 PROCEDURE — RXMED WILLOW AMBULATORY MEDICATION CHARGE: Performed by: PSYCHIATRY & NEUROLOGY

## 2024-01-05 ASSESSMENT — PATIENT HEALTH QUESTIONNAIRE - PHQ9: CLINICAL INTERPRETATION OF PHQ2 SCORE: 0

## 2024-01-05 NOTE — PROGRESS NOTES
"PSYCHIATRY VIRTUAL VISIT FOLLOW-UP NOTE    Chief Complaint   Patient presents with    Follow-Up     Depression, anxiety     This evaluation was conducted via Zoom using secure and encrypted videoconferencing technology.   The patient was in their home in the [facility].    The patient's identity was confirmed and verbal consent was obtained for this virtual visit.    History Of Present Illness:  Lucy Al is a 28 y.o. female with major depressive disorder, anxiety disorder, opioid use disorder comes in today for follow up, was last seen 6 months ago.  She has been doing good in regards to what anxiety and depression.  She moved out of her parents home and got house close by and is doing good living independently.  She mentions that a close will be moving in with her in the near future.  She has cut off a few of the relationships that were not healthy and she is trying her best to have good boundaries with them.  She is concerned about possible ADHD and autism after having a recent conversation with her.  She feels that she is not fully functional in her life and would like to get further tested for it.  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    Substance Use:  Alcohol - Infrequent, in social settings   Nicotine - Denies   Cannabis - Smokes THC daily  Illicit drugs - Denies     Past Medication Trials:  Prozac at age 16 (\"never gave it a chance\"), Wellbutrin at age 16 (\"never gave it a chance\")    Medications:  Current Outpatient Medications   Medication Sig Dispense Refill    sertraline (ZOLOFT) 50 MG Tab Take 1 Tablet by mouth every day. 90 Tablet 1    drospirenone-ethinyl estradiol (VESTURA) 3-0.02 MG per tablet Take 1 tablet Orally Once a day for 84 " "days 84 Tablet 4    VITAMIN D PO Take 2,000 Units by mouth every day.       No current facility-administered medications for this visit.     Review Of Systems:    Psychiatric - Negative for anxiety, depression    Physical Examination:  Vital signs: There were no vitals taken for this visit.    Musculoskeletal: No abnormal movements.     Mental Status Evaluation:   General: Young female, dressed in casual attire, good grooming and hygiene, in no apparent distress, calm and cooperative, good eye contact, psychomotor retardation  Orientation: Alert and oriented to person, place and time  Recent and remote memory: Grossly intact  Attention span and concentration: Grossly intact  Speech: Spontaneous, normal rate, rhythm and tone  Thought Process: Linear, logical and goal directed  Thought Content: Denies suicidal or homicidal ideations, intent or plan  Perception: No delusions noted  Associations: Intact  Language: Appropriate  Fund of knowledge and vocabulary: Grossly adequate  Mood: \"great\"  Affect: Euthymic, mood congruent  Insight: Good  Judgment: Good    Depression screenin/18/2022    11:30 AM 2023    11:04 AM 2024    11:30 AM   Depression Screen (PHQ-2/PHQ-9)   PHQ-2 Total Score  0    PHQ-2 Total Score 0  0   PHQ-9 Total Score  0      Interpretation of PHQ-9 Total Score   Score Severity   1-4 No Depression   5-9 Mild Depression   10-14 Moderate Depression   15-19 Moderately Severe Depression   20-27 Severe Depression    Medical Records/Labs/Diagnostic Tests Reviewed:  [place] PDMP records - no prescribed controlled medications found in the last 2 years      Impression:  1.  Major depressive disorder, recurrent, in full remission - stable  2.  Anxiety disorder, unspecified type - stable  3.  Opioid use disorder, moderate, in sustained remission (last used heroin 2019 and Oxycodone 2021) - stable    Plan:  1.  Continue Zoloft 50 mg daily for depression and anxiety  2.  Continue to maintain sobriety " from opioids and other illicit drugs  3.  Continue individual psychotherapy with CECI Hernandez   4.  Will provide her with community resources for psychological testing for ADHD.    Return to clinic in 6 months or sooner if symptoms worsen    The proposed treatment plan was discussed with the patient who was provided the opportunity to ask questions and make suggestions regarding alternative treatment. Patient verbalized understanding and expressed agreement with the plan.     Kira Onofre M.D.  01/05/24    This note was created using voice recognition software (Dragon). The accuracy of the dictation is limited by the abilities of the software. I have reviewed the note prior to signing, however some errors in grammar and context are still possible. If you have any questions related to this note please do not hesitate to contact our office.

## 2024-01-07 ENCOUNTER — PHARMACY VISIT (OUTPATIENT)
Dept: PHARMACY | Facility: MEDICAL CENTER | Age: 29
End: 2024-01-07
Payer: COMMERCIAL

## 2024-01-22 ENCOUNTER — PHARMACY VISIT (OUTPATIENT)
Dept: PHARMACY | Facility: MEDICAL CENTER | Age: 29
End: 2024-01-22
Payer: COMMERCIAL

## 2024-01-22 PROCEDURE — RXMED WILLOW AMBULATORY MEDICATION CHARGE: Performed by: OBSTETRICS & GYNECOLOGY

## 2024-04-21 PROCEDURE — RXMED WILLOW AMBULATORY MEDICATION CHARGE: Performed by: OBSTETRICS & GYNECOLOGY

## 2024-04-24 ENCOUNTER — PHARMACY VISIT (OUTPATIENT)
Dept: PHARMACY | Facility: MEDICAL CENTER | Age: 29
End: 2024-04-24
Payer: COMMERCIAL

## 2024-04-30 ENCOUNTER — HOSPITAL ENCOUNTER (OUTPATIENT)
Facility: MEDICAL CENTER | Age: 29
End: 2024-04-30
Attending: OBSTETRICS & GYNECOLOGY
Payer: COMMERCIAL

## 2024-05-04 LAB
SPEC CONTAINER SPEC: NORMAL
SPECIMEN SOURCE: NORMAL
T VAGINALIS RRNA SPEC QL NAA+PROBE: NEGATIVE

## 2024-05-08 LAB
C TRACH RRNA CVX QL NAA+PROBE: NEGATIVE
COMMENT NL11729A: NORMAL
CYTOLOGIST CVX/VAG CYTO: NORMAL
CYTOLOGY CVX/VAG DOC CYTO: NORMAL
CYTOLOGY CVX/VAG DOC THIN PREP: NORMAL
N GONORRHOEA RRNA CVX QL NAA+PROBE: NEGATIVE
NOTE NL11727A: NORMAL
OTHER STN SPEC: NORMAL
STAT OF ADQ CVX/VAG CYTO-IMP: NORMAL

## 2024-06-25 PROCEDURE — RXMED WILLOW AMBULATORY MEDICATION CHARGE: Performed by: PSYCHIATRY & NEUROLOGY

## 2024-06-26 ENCOUNTER — PHARMACY VISIT (OUTPATIENT)
Dept: PHARMACY | Facility: MEDICAL CENTER | Age: 29
End: 2024-06-26
Payer: COMMERCIAL

## 2024-07-07 DIAGNOSIS — Z11.4 SCREENING FOR HIV WITHOUT PRESENCE OF RISK FACTORS: ICD-10-CM

## 2024-07-07 DIAGNOSIS — E55.9 VITAMIN D DEFICIENCY: ICD-10-CM

## 2024-07-07 DIAGNOSIS — Z00.00 ROUTINE HEALTH MAINTENANCE: ICD-10-CM

## 2024-07-07 DIAGNOSIS — E66.3 OVERWEIGHT (BMI 25.0-29.9): ICD-10-CM

## 2024-07-07 DIAGNOSIS — Z13.220 SCREENING FOR LIPID DISORDERS: ICD-10-CM

## 2024-07-07 PROCEDURE — RXMED WILLOW AMBULATORY MEDICATION CHARGE: Performed by: OBSTETRICS & GYNECOLOGY

## 2024-07-09 ENCOUNTER — PHARMACY VISIT (OUTPATIENT)
Dept: PHARMACY | Facility: MEDICAL CENTER | Age: 29
End: 2024-07-09
Payer: COMMERCIAL

## 2024-07-11 ENCOUNTER — HOSPITAL ENCOUNTER (OUTPATIENT)
Dept: LAB | Facility: MEDICAL CENTER | Age: 29
End: 2024-07-11
Attending: NURSE PRACTITIONER
Payer: COMMERCIAL

## 2024-07-11 DIAGNOSIS — Z00.00 ROUTINE HEALTH MAINTENANCE: ICD-10-CM

## 2024-07-11 DIAGNOSIS — E55.9 VITAMIN D DEFICIENCY: ICD-10-CM

## 2024-07-11 DIAGNOSIS — Z11.4 SCREENING FOR HIV WITHOUT PRESENCE OF RISK FACTORS: ICD-10-CM

## 2024-07-11 DIAGNOSIS — E66.3 OVERWEIGHT (BMI 25.0-29.9): ICD-10-CM

## 2024-07-11 DIAGNOSIS — Z13.220 SCREENING FOR LIPID DISORDERS: ICD-10-CM

## 2024-07-11 LAB
25(OH)D3 SERPL-MCNC: 43 NG/ML (ref 30–100)
ALBUMIN SERPL BCP-MCNC: 4.1 G/DL (ref 3.2–4.9)
ALBUMIN/GLOB SERPL: 1.5 G/DL
ALP SERPL-CCNC: 87 U/L (ref 30–99)
ALT SERPL-CCNC: 25 U/L (ref 2–50)
ANION GAP SERPL CALC-SCNC: 15 MMOL/L (ref 7–16)
AST SERPL-CCNC: 22 U/L (ref 12–45)
BASOPHILS # BLD AUTO: 0.6 % (ref 0–1.8)
BASOPHILS # BLD: 0.05 K/UL (ref 0–0.12)
BILIRUB SERPL-MCNC: 0.4 MG/DL (ref 0.1–1.5)
BUN SERPL-MCNC: 8 MG/DL (ref 8–22)
CALCIUM ALBUM COR SERPL-MCNC: 9.3 MG/DL (ref 8.5–10.5)
CALCIUM SERPL-MCNC: 9.4 MG/DL (ref 8.5–10.5)
CHLORIDE SERPL-SCNC: 103 MMOL/L (ref 96–112)
CHOLEST SERPL-MCNC: 171 MG/DL (ref 100–199)
CO2 SERPL-SCNC: 18 MMOL/L (ref 20–33)
CREAT SERPL-MCNC: 0.55 MG/DL (ref 0.5–1.4)
EOSINOPHIL # BLD AUTO: 0.03 K/UL (ref 0–0.51)
EOSINOPHIL NFR BLD: 0.4 % (ref 0–6.9)
ERYTHROCYTE [DISTWIDTH] IN BLOOD BY AUTOMATED COUNT: 39.7 FL (ref 35.9–50)
GFR SERPLBLD CREATININE-BSD FMLA CKD-EPI: 127 ML/MIN/1.73 M 2
GLOBULIN SER CALC-MCNC: 2.8 G/DL (ref 1.9–3.5)
GLUCOSE SERPL-MCNC: 85 MG/DL (ref 65–99)
HCT VFR BLD AUTO: 44.4 % (ref 37–47)
HDLC SERPL-MCNC: 88 MG/DL
HGB BLD-MCNC: 14.1 G/DL (ref 12–16)
HIV 1+2 AB+HIV1 P24 AG SERPL QL IA: NORMAL
IMM GRANULOCYTES # BLD AUTO: 0.02 K/UL (ref 0–0.11)
IMM GRANULOCYTES NFR BLD AUTO: 0.3 % (ref 0–0.9)
LDLC SERPL CALC-MCNC: 71 MG/DL
LYMPHOCYTES # BLD AUTO: 3.59 K/UL (ref 1–4.8)
LYMPHOCYTES NFR BLD: 46.2 % (ref 22–41)
MCH RBC QN AUTO: 25.8 PG (ref 27–33)
MCHC RBC AUTO-ENTMCNC: 31.8 G/DL (ref 32.2–35.5)
MCV RBC AUTO: 81.2 FL (ref 81.4–97.8)
MONOCYTES # BLD AUTO: 0.56 K/UL (ref 0–0.85)
MONOCYTES NFR BLD AUTO: 7.2 % (ref 0–13.4)
NEUTROPHILS # BLD AUTO: 3.52 K/UL (ref 1.82–7.42)
NEUTROPHILS NFR BLD: 45.3 % (ref 44–72)
NRBC # BLD AUTO: 0 K/UL
NRBC BLD-RTO: 0 /100 WBC (ref 0–0.2)
PLATELET # BLD AUTO: 248 K/UL (ref 164–446)
PMV BLD AUTO: 11 FL (ref 9–12.9)
POTASSIUM SERPL-SCNC: 3.8 MMOL/L (ref 3.6–5.5)
PROT SERPL-MCNC: 6.9 G/DL (ref 6–8.2)
RBC # BLD AUTO: 5.47 M/UL (ref 4.2–5.4)
SODIUM SERPL-SCNC: 136 MMOL/L (ref 135–145)
TRIGL SERPL-MCNC: 61 MG/DL (ref 0–149)
TSH SERPL DL<=0.005 MIU/L-ACNC: 0.49 UIU/ML (ref 0.38–5.33)
WBC # BLD AUTO: 7.8 K/UL (ref 4.8–10.8)

## 2024-07-11 PROCEDURE — 85025 COMPLETE CBC W/AUTO DIFF WBC: CPT

## 2024-07-11 PROCEDURE — 80061 LIPID PANEL: CPT

## 2024-07-11 PROCEDURE — 36415 COLL VENOUS BLD VENIPUNCTURE: CPT

## 2024-07-11 PROCEDURE — 87389 HIV-1 AG W/HIV-1&-2 AB AG IA: CPT

## 2024-07-11 PROCEDURE — 82306 VITAMIN D 25 HYDROXY: CPT

## 2024-07-11 PROCEDURE — 80053 COMPREHEN METABOLIC PANEL: CPT

## 2024-07-11 PROCEDURE — 84443 ASSAY THYROID STIM HORMONE: CPT

## 2024-07-15 ENCOUNTER — HOSPITAL ENCOUNTER (OUTPATIENT)
Dept: LAB | Facility: MEDICAL CENTER | Age: 29
End: 2024-07-15
Attending: NURSE PRACTITIONER
Payer: COMMERCIAL

## 2024-07-15 ENCOUNTER — OFFICE VISIT (OUTPATIENT)
Dept: MEDICAL GROUP | Facility: PHYSICIAN GROUP | Age: 29
End: 2024-07-15
Payer: COMMERCIAL

## 2024-07-15 VITALS
HEIGHT: 59 IN | TEMPERATURE: 98.3 F | DIASTOLIC BLOOD PRESSURE: 62 MMHG | WEIGHT: 146.3 LBS | SYSTOLIC BLOOD PRESSURE: 110 MMHG | HEART RATE: 80 BPM | OXYGEN SATURATION: 97 % | BODY MASS INDEX: 29.49 KG/M2

## 2024-07-15 DIAGNOSIS — R79.89 ABNORMAL CBC: ICD-10-CM

## 2024-07-15 DIAGNOSIS — R71.8 ELEVATED RED BLOOD CELL COUNT: ICD-10-CM

## 2024-07-15 DIAGNOSIS — Z00.01 ENCOUNTER FOR WELL ADULT EXAM WITH ABNORMAL FINDINGS: ICD-10-CM

## 2024-07-15 DIAGNOSIS — R71.8 MICROCYTOSIS: ICD-10-CM

## 2024-07-15 DIAGNOSIS — L91.8 SKIN TAG: ICD-10-CM

## 2024-07-15 DIAGNOSIS — F33.42 MDD (MAJOR DEPRESSIVE DISORDER), RECURRENT, IN FULL REMISSION (HCC): ICD-10-CM

## 2024-07-15 PROCEDURE — 99395 PREV VISIT EST AGE 18-39: CPT | Mod: 25 | Performed by: NURSE PRACTITIONER

## 2024-07-15 PROCEDURE — 36415 COLL VENOUS BLD VENIPUNCTURE: CPT

## 2024-07-15 PROCEDURE — 83021 HEMOGLOBIN CHROMOTOGRAPHY: CPT

## 2024-07-15 PROCEDURE — 3074F SYST BP LT 130 MM HG: CPT | Performed by: NURSE PRACTITIONER

## 2024-07-15 PROCEDURE — 11200 RMVL SKIN TAGS UP TO&INC 15: CPT | Performed by: NURSE PRACTITIONER

## 2024-07-15 PROCEDURE — 3078F DIAST BP <80 MM HG: CPT | Performed by: NURSE PRACTITIONER

## 2024-07-15 SDOH — ECONOMIC STABILITY: INCOME INSECURITY: HOW HARD IS IT FOR YOU TO PAY FOR THE VERY BASICS LIKE FOOD, HOUSING, MEDICAL CARE, AND HEATING?: NOT VERY HARD

## 2024-07-15 SDOH — ECONOMIC STABILITY: FOOD INSECURITY: WITHIN THE PAST 12 MONTHS, THE FOOD YOU BOUGHT JUST DIDN'T LAST AND YOU DIDN'T HAVE MONEY TO GET MORE.: NEVER TRUE

## 2024-07-15 SDOH — ECONOMIC STABILITY: TRANSPORTATION INSECURITY
IN THE PAST 12 MONTHS, HAS LACK OF RELIABLE TRANSPORTATION KEPT YOU FROM MEDICAL APPOINTMENTS, MEETINGS, WORK OR FROM GETTING THINGS NEEDED FOR DAILY LIVING?: NO

## 2024-07-15 SDOH — ECONOMIC STABILITY: TRANSPORTATION INSECURITY
IN THE PAST 12 MONTHS, HAS LACK OF TRANSPORTATION KEPT YOU FROM MEETINGS, WORK, OR FROM GETTING THINGS NEEDED FOR DAILY LIVING?: NO

## 2024-07-15 SDOH — ECONOMIC STABILITY: HOUSING INSECURITY
IN THE LAST 12 MONTHS, WAS THERE A TIME WHEN YOU DID NOT HAVE A STEADY PLACE TO SLEEP OR SLEPT IN A SHELTER (INCLUDING NOW)?: NO

## 2024-07-15 SDOH — ECONOMIC STABILITY: FOOD INSECURITY: WITHIN THE PAST 12 MONTHS, YOU WORRIED THAT YOUR FOOD WOULD RUN OUT BEFORE YOU GOT MONEY TO BUY MORE.: NEVER TRUE

## 2024-07-15 SDOH — ECONOMIC STABILITY: HOUSING INSECURITY: IN THE LAST 12 MONTHS, HOW MANY PLACES HAVE YOU LIVED?: 1

## 2024-07-15 SDOH — ECONOMIC STABILITY: INCOME INSECURITY: IN THE LAST 12 MONTHS, WAS THERE A TIME WHEN YOU WERE NOT ABLE TO PAY THE MORTGAGE OR RENT ON TIME?: NO

## 2024-07-15 SDOH — HEALTH STABILITY: PHYSICAL HEALTH
ON AVERAGE, HOW MANY DAYS PER WEEK DO YOU ENGAGE IN MODERATE TO STRENUOUS EXERCISE (LIKE A BRISK WALK)?: PATIENT DECLINED

## 2024-07-15 SDOH — ECONOMIC STABILITY: TRANSPORTATION INSECURITY
IN THE PAST 12 MONTHS, HAS THE LACK OF TRANSPORTATION KEPT YOU FROM MEDICAL APPOINTMENTS OR FROM GETTING MEDICATIONS?: NO

## 2024-07-15 SDOH — HEALTH STABILITY: PHYSICAL HEALTH: ON AVERAGE, HOW MANY MINUTES DO YOU ENGAGE IN EXERCISE AT THIS LEVEL?: PATIENT DECLINED

## 2024-07-15 SDOH — HEALTH STABILITY: MENTAL HEALTH
STRESS IS WHEN SOMEONE FEELS TENSE, NERVOUS, ANXIOUS, OR CAN'T SLEEP AT NIGHT BECAUSE THEIR MIND IS TROUBLED. HOW STRESSED ARE YOU?: NOT AT ALL

## 2024-07-15 ASSESSMENT — SOCIAL DETERMINANTS OF HEALTH (SDOH)
HOW OFTEN DO YOU ATTEND CHURCH OR RELIGIOUS SERVICES?: NEVER
HOW OFTEN DO YOU GET TOGETHER WITH FRIENDS OR RELATIVES?: PATIENT DECLINED
DO YOU BELONG TO ANY CLUBS OR ORGANIZATIONS SUCH AS CHURCH GROUPS UNIONS, FRATERNAL OR ATHLETIC GROUPS, OR SCHOOL GROUPS?: NO
HOW OFTEN DO YOU HAVE A DRINK CONTAINING ALCOHOL: MONTHLY OR LESS
HOW HARD IS IT FOR YOU TO PAY FOR THE VERY BASICS LIKE FOOD, HOUSING, MEDICAL CARE, AND HEATING?: NOT VERY HARD
IN A TYPICAL WEEK, HOW MANY TIMES DO YOU TALK ON THE PHONE WITH FAMILY, FRIENDS, OR NEIGHBORS?: MORE THAN THREE TIMES A WEEK
HOW OFTEN DO YOU ATTENT MEETINGS OF THE CLUB OR ORGANIZATION YOU BELONG TO?: NEVER
HOW OFTEN DO YOU ATTENT MEETINGS OF THE CLUB OR ORGANIZATION YOU BELONG TO?: NEVER
HOW MANY DRINKS CONTAINING ALCOHOL DO YOU HAVE ON A TYPICAL DAY WHEN YOU ARE DRINKING: 1 OR 2
WITHIN THE PAST 12 MONTHS, YOU WORRIED THAT YOUR FOOD WOULD RUN OUT BEFORE YOU GOT THE MONEY TO BUY MORE: NEVER TRUE
HOW OFTEN DO YOU GET TOGETHER WITH FRIENDS OR RELATIVES?: PATIENT DECLINED
DO YOU BELONG TO ANY CLUBS OR ORGANIZATIONS SUCH AS CHURCH GROUPS UNIONS, FRATERNAL OR ATHLETIC GROUPS, OR SCHOOL GROUPS?: NO
HOW OFTEN DO YOU HAVE SIX OR MORE DRINKS ON ONE OCCASION: NEVER
IN A TYPICAL WEEK, HOW MANY TIMES DO YOU TALK ON THE PHONE WITH FAMILY, FRIENDS, OR NEIGHBORS?: MORE THAN THREE TIMES A WEEK
IN THE PAST 12 MONTHS, HAS THE ELECTRIC, GAS, OIL, OR WATER COMPANY THREATENED TO SHUT OFF SERVICE IN YOUR HOME?: NO
HOW OFTEN DO YOU ATTEND CHURCH OR RELIGIOUS SERVICES?: NEVER

## 2024-07-15 ASSESSMENT — LIFESTYLE VARIABLES
HOW OFTEN DO YOU HAVE A DRINK CONTAINING ALCOHOL: MONTHLY OR LESS
HOW OFTEN DO YOU HAVE SIX OR MORE DRINKS ON ONE OCCASION: NEVER
HOW MANY STANDARD DRINKS CONTAINING ALCOHOL DO YOU HAVE ON A TYPICAL DAY: 1 OR 2
AUDIT-C TOTAL SCORE: 1
SKIP TO QUESTIONS 9-10: 1

## 2024-07-15 ASSESSMENT — FIBROSIS 4 INDEX: FIB4 SCORE: 0.5

## 2024-07-19 ENCOUNTER — TELEMEDICINE (OUTPATIENT)
Dept: BEHAVIORAL HEALTH | Facility: CLINIC | Age: 29
End: 2024-07-19
Payer: COMMERCIAL

## 2024-07-19 DIAGNOSIS — F41.9 ANXIETY DISORDER, UNSPECIFIED TYPE: ICD-10-CM

## 2024-07-19 DIAGNOSIS — F33.42 MDD (MAJOR DEPRESSIVE DISORDER), RECURRENT, IN FULL REMISSION (HCC): ICD-10-CM

## 2024-07-19 PROCEDURE — 99214 OFFICE O/P EST MOD 30 MIN: CPT | Mod: 95 | Performed by: PSYCHIATRY & NEUROLOGY

## 2024-07-25 LAB — HGB A1 MFR BLD: NORMAL %

## 2024-07-29 DIAGNOSIS — Z00.00 ROUTINE HEALTH MAINTENANCE: ICD-10-CM

## 2024-07-29 DIAGNOSIS — E55.9 VITAMIN D DEFICIENCY: ICD-10-CM

## 2024-07-29 DIAGNOSIS — E66.3 OVERWEIGHT (BMI 25.0-29.9): ICD-10-CM

## 2024-07-29 DIAGNOSIS — F33.42 MDD (MAJOR DEPRESSIVE DISORDER), RECURRENT, IN FULL REMISSION (HCC): ICD-10-CM

## 2024-07-29 DIAGNOSIS — Z13.220 SCREENING FOR LIPID DISORDERS: ICD-10-CM

## 2024-07-29 DIAGNOSIS — N91.2 AMENORRHEA: ICD-10-CM

## 2024-08-20 NOTE — PROGRESS NOTES
"Subjective:     CC:   Chief Complaint   Patient presents with   • Annual Exam     PE form for group therapy     HPI:   Lucy Al is a 23 y.o. female who presents for annual exam.     Lucy states that she will be starting outpatient group therapy with [facility] through the end of September. She requests to complete a physical at this time for the program. She will be starting the program tomorrow. Lucy also mentions that she has requested FMLA paperwork from her employer which she requests to have completed later day after she receives the paperwork.     Lucy also mentions that she is currently on her fourth day of \"heroine withdrawal.\" She reports that she has been using heroin every other day for the last couple of months. Lucy denies injecting heroine, but states she last used on 7/6/19. She did not begin to exhibit signs of withdrawal until 7/8/19 at which time she experienced weakness and sweats. She now complains of nausea, trouble eating, and difficulty sleeping. She is overall feeling better. She wonders if she may be given medication to help treat her symptoms.     Patient has GYN provider: no  Last pap: 3/2017, normal   Last mammo: N/A  Last colonoscopy: N/A  Last bone density test: N/A  Last Tdap: 6/2019  Gardiasil: Patient refused   Hx. STD's: No, patient would like to be screened  Birth control: Condoms, patient is not on hormonal birth control     Menses regular every month with light to moderate bleeding.  Cramping is none.   She does not take OTC analgesics for cramps.  No significant bloating/fluid retention, pelvic pain, or dyspareunia. No vaginal discharge.  No breast tenderness, mass, nipple discharge, changes in size or contour, or abnormal cyclic discomfort.  She does not perform regular self breast exams.  Regular exercise: yes   Diet: Healthy     Additionally, Lucy states that she has been smoking for the last 10 years although she is quitting and is now smoking 0-1 cigarettes " Detail Level: Generalized daily.     She  has a past medical history of Anxiety; Depression; Pericarditis; and Substance abuse (HCC).  She  has no past surgical history on file.    Family History   Problem Relation Age of Onset   • Alcohol/Drug Father         alcoholism   • Psychiatry Father         bipolar disorder   • Bipolar disorder Father    • Drug abuse Father         methamphetamine addiction       Social History     Social History   • Marital status: Single     Spouse name: N/A   • Number of children: N/A   • Years of education: N/A     Occupational History   • Not on file.     Social History Main Topics   • Smoking status: Light Tobacco Smoker   • Smokeless tobacco: Never Used      Comment: 3-4 cigarettes daily   • Alcohol use 0.0 oz/week      Comment: infrequent   • Drug use: Yes     Types: Marijuana, Inhaled      Comment: bowl a day   • Sexual activity: Yes     Partners: Male     Other Topics Concern   • Not on file     Social History Narrative   • No narrative on file       Patient Active Problem List    Diagnosis Date Noted   • MDD (major depressive disorder), recurrent episode, moderate (HCC) 07/01/2019   • Anxiety disorder 07/01/2019   • Hordeolum externum of left upper eyelid 10/22/2018   • Vitamin D deficiency 01/13/2017   • History of substance abuse 11/28/2016         Current Outpatient Prescriptions   Medication Sig Dispense Refill   • hydrOXYzine HCl (ATARAX) 25 MG Tab Take 1 Tab by mouth 3 times a day as needed for Anxiety (or difficulty sleeping). 30 Tab 0   • sertraline (ZOLOFT) 100 MG Tab Take 1 Tab by mouth every day. 30 Tab 2   • vitamin D (CHOLECALCIFEROL) 1000 UNIT Tab Take 1,000 Units by mouth every day.       No current facility-administered medications for this visit.      No Known Allergies    Review of Systems   Constitutional: Negative for fever, chills and malaise/fatigue.   HENT: Negative for congestion.    Eyes: Negative for pain.   Respiratory: Negative for cough and shortness of breath.   "  Cardiovascular: Negative for leg swelling.   Gastrointestinal: Negative for nausea, vomiting, abdominal pain and diarrhea.   Genitourinary: Negative for dysuria and hematuria.   Skin: Negative for rash.   Neurological: Negative for dizziness, focal weakness and headaches.   Endo/Heme/Allergies: Does not bruise/bleed easily.   Psychiatric/Behavioral: Positive for depression and anxiety.    Objective:     /64   Pulse 96   Temp 37 °C (98.6 °F)   Resp 14   Ht 1.499 m (4' 11\")   Wt 48.5 kg (107 lb)   SpO2 95%   BMI 21.61 kg/m²   Body mass index is 21.61 kg/m².  Wt Readings from Last 4 Encounters:   07/10/19 48.5 kg (107 lb)   07/01/19 51.3 kg (113 lb)   06/14/19 49.4 kg (109 lb)   04/24/19 52 kg (114 lb 9.6 oz)     Physical Exam:  Constitutional: Well-developed and well-nourished. Not diaphoretic. No distress. No evidence of overt opioid withdrawal.  Skin: Skin is warm and dry. No rash noted.  Head: Atraumatic without lesions.  Eyes: Conjunctivae and extraocular motions are normal. Pupils are equal, round, and reactive to light. No scleral icterus.   Ears:  External ears unremarkable. Tympanic membranes clear and intact.  Nose: Nares patent. Septum midline. Turbinates without erythema nor edema. No discharge.   Mouth/Throat: Dentition is good. Tongue normal. Oropharynx is clear and moist. Posterior pharynx without erythema or exudates.  Neck: Supple, trachea midline. Normal range of motion. No thyromegaly present. No lymphadenopathy--cervical or supraclavicular.  Cardiovascular: Regular rate and rhythm, S1 and S2 without murmur, rubs, or gallops.    Abdomen: Soft, non tender, and without distention. Active bowel sounds in all four quadrants. No rebound, guarding, masses or HSM.  Extremities: No cyanosis, clubbing, erythema, nor edema. Distal pulses intact and symmetric.   Musculoskeletal: All major joints AROM full in all directions without pain.  Neurological: Alert and oriented x 3. DTRs 2+/3 and " symmetric. No cranial nerve deficit. 5/5 myotomes. Sensation intact. Negative Rhomberg.  Psychiatric: Flat affect and avoiding eye contact. No evidence of psychotic process.    Assessment and Plan:     1. Encounter for routine history and physical exam in female  This is a 23-year-old female here today for a preventative exam. Previous medical history, healthcare maintenance and immunization status reviewed. Patient is up to date. Labwork ordered as discussed below. See discussion of anticipatory guidance below. Patient will return annually for preventative exams.   2. MDD (major depressive disorder), recurrent episode, moderate (HCC)  Chronic. Patient has tolerated her Sertraline 100 mg without any complications and would like to continue taking the medication. Patient will begin taking Hydroxyzine at this time as well as needed to help with anxiety and sleep. Patient will be starting her outpatient group therapy program tomorrow. I have discussed that her psychiatrist may make medication adjustments as necessary. She denies any SI or HI. Emphasized importance of healthy diet and exercise. Discussed that should the patient have any symptoms they should call suicide prevention hotline or report to the emergency room immediately.    There are no contraindications to her participating in the intensive outpatient program. Paperwork completed.    hydrOXYzine HCl (ATARAX) 25 MG Tab   3. Anxiety disorder, unspecified type  hydrOXYzine HCl (ATARAX) 25 MG Tab   4. History of substance abuse  No overt signs of opioid withdrawal. Vital signs are stable. I provided education about addiction and strongly urged her to avoid any further illicit drug use.   5. History of heroin abuse  Plan same as above.    6. Screen for STD (sexually transmitted disease)  Labs as indicated.   Chlamydia/GC PCR Urine Or Swab     HCM:  Due for vaccines, patient declines   Labs per orders  Immunizations per orders  Patient counseled about skin care,  diet, supplements, prenatal vitamins, safe sex and exercise.    Follow-up: After intensive outpatient therapy    Semaj CAMPOS (Scranupe), am scribing for, and in the presence of, Zoraida Chavez MD    Electronically signed by: Semaj Hawthorne (Scribe), 7/10/2019    Zoraida CAMPOS MD personally performed the services described in this documentation, as scribed by Semaj Hawthorne in my presence, and it is both accurate and complete.

## 2024-09-09 PROCEDURE — RXMED WILLOW AMBULATORY MEDICATION CHARGE: Performed by: OBSTETRICS & GYNECOLOGY

## 2024-09-09 PROCEDURE — RXMED WILLOW AMBULATORY MEDICATION CHARGE: Performed by: PSYCHIATRY & NEUROLOGY

## 2024-09-10 ENCOUNTER — PHARMACY VISIT (OUTPATIENT)
Dept: PHARMACY | Facility: MEDICAL CENTER | Age: 29
End: 2024-09-10
Payer: COMMERCIAL

## 2024-09-30 ENCOUNTER — IMMUNIZATION (OUTPATIENT)
Dept: OCCUPATIONAL MEDICINE | Facility: CLINIC | Age: 29
End: 2024-09-30

## 2024-09-30 DIAGNOSIS — Z23 NEED FOR VACCINATION: Primary | ICD-10-CM

## 2024-09-30 PROCEDURE — 90656 IIV3 VACC NO PRSV 0.5 ML IM: CPT | Performed by: PREVENTIVE MEDICINE

## 2024-11-07 ENCOUNTER — APPOINTMENT (OUTPATIENT)
Dept: MEDICAL GROUP | Facility: PHYSICIAN GROUP | Age: 29
End: 2024-11-07
Payer: COMMERCIAL

## 2024-11-07 VITALS
HEART RATE: 88 BPM | WEIGHT: 148 LBS | TEMPERATURE: 96.8 F | BODY MASS INDEX: 29.84 KG/M2 | OXYGEN SATURATION: 98 % | HEIGHT: 59 IN

## 2024-11-07 DIAGNOSIS — R63.5 WEIGHT GAIN: ICD-10-CM

## 2024-11-07 DIAGNOSIS — F33.42 MDD (MAJOR DEPRESSIVE DISORDER), RECURRENT, IN FULL REMISSION (HCC): ICD-10-CM

## 2024-11-07 DIAGNOSIS — E66.3 OVERWEIGHT (BMI 25.0-29.9): ICD-10-CM

## 2024-11-07 LAB
HBA1C MFR BLD: 5.4 % (ref ?–5.8)
POCT INT CON NEG: NEGATIVE
POCT INT CON POS: POSITIVE

## 2024-11-07 PROCEDURE — 99213 OFFICE O/P EST LOW 20 MIN: CPT | Performed by: NURSE PRACTITIONER

## 2024-11-07 PROCEDURE — 83036 HEMOGLOBIN GLYCOSYLATED A1C: CPT | Performed by: NURSE PRACTITIONER

## 2024-11-07 ASSESSMENT — FIBROSIS 4 INDEX: FIB4 SCORE: 0.51

## 2024-11-11 NOTE — PROGRESS NOTES
"Verbal consent was acquired by the patient to use [employer] ambient listening note generation during this visit Yes      Subjective   Lucy Al is a 29 y.o. female who presents for:  History of Present Illness  The patient presents for evaluation of multiple medical concerns.    She began taking Zoloft in August 2018. She has started to wean off of the medication, initially at a dose of three-quarters of a 50 mg pill. After six weeks, she reduced the dosage to half a pill. She reports feeling better and believes she no longer requires the medication. She has been on the half-pill dosage for approximately five days and reports no side effects such as dizziness, nausea, or upset stomach.    She mentions weight gain as a concern. She has been monitoring her diet and does not eat after 8 PM. She stopped going to the gym after moving to a new house.    She expresses dissatisfaction with the election results and has uninstalled all her social media apps. She is currently on Lee Ann birth control pills.    She has noticed some discoloration on her knees and is unsure if it is related to her sugar intake or insulin levels. She has been applying Vaseline to the area every night.    SOCIAL HISTORY  She was on drugs in 2018 and stopped using drugs in 07/2019.    ROS:  See HPI    Objective   Pulse 88   Temp 36 °C (96.8 °F) (Temporal)   Ht 1.499 m (4' 11\")   Wt 67.1 kg (148 lb)   SpO2 98%   Physical Exam  General: Well nourished, well developed female in NAD, awake and conversant.  Eyes: Normal conjunctiva, anicteric.  Round symmetrical pupils.  ENT: Hearing grossly intact.  No nasal discharge.  Neck: Neck is supple.  No masses or thyromegaly.  CV: No lower extremity edema.  Respiratory: Respirations are nonlabored.  No wheezing.  Abdomen: Non-Distended.  Skin: Warm.  No rashes or ulcers.  MSK: Normal ambulation.  No clubbing or cyanosis.  Neuro: Sensation and CN II-XII grossly normal.  Psych: Alert and oriented.  " Cooperative, appropriate mood and affect, normal judgment.      Results  Laboratory Studies  A1c was 5.4 in 2023.  A1c was 5.4 in clinic today    Assessment & Plan  1. MDD (major depressive disorder), recurrent, in full remission (HCC)  Chronic, improving. She has been tapering off sertraline since September 21, 2024, and is currently taking half a pill daily. She will continue taking a quarter pill daily until her current supply is exhausted, at which point she can discontinue the medication. She reports feeling ready to stop the medication as she believes she no longer needs it. She was advised to engage in regular physical activity and maintain a healthy diet, with a recommendation to follow the Mediterranean diet. She was also advised to limit her use of social media to reduce stress.    2. Overweight (BMI 25.0-29.9)  3. Weight gain  Chronic, ongoing. She has gained 20 pounds over the past 5 years, which she attributes partially to sertraline. She was encouraged to return to regular exercise and follow the Mediterranean diet to help manage her weight. The discoloration is likely due to her clothing. An A1c test will be conducted today to rule out any underlying issues related to blood sugar levels. Patient was assured that her A1c is in healthy [place] at 5.4%.  - POCT  A1C     Return if symptoms worsen or fail to improve.     I have placed the below orders and discussed them with an approved delegating provider. The MA is performing the below orders under the direction of Dr. Caldwell.      Please note that this dictation was created using voice recognition software. I have made every reasonable attempt to correct obvious errors, but I expect that there are errors of grammar and possibly content that I did not discover before finalizing the note.

## 2024-12-22 PROCEDURE — RXMED WILLOW AMBULATORY MEDICATION CHARGE: Performed by: OBSTETRICS & GYNECOLOGY

## 2024-12-24 ENCOUNTER — PHARMACY VISIT (OUTPATIENT)
Dept: PHARMACY | Facility: MEDICAL CENTER | Age: 29
End: 2024-12-24
Payer: COMMERCIAL

## 2025-01-17 ENCOUNTER — TELEMEDICINE (OUTPATIENT)
Dept: BEHAVIORAL HEALTH | Facility: CLINIC | Age: 30
End: 2025-01-17
Payer: COMMERCIAL

## 2025-01-17 DIAGNOSIS — F41.9 ANXIETY DISORDER, UNSPECIFIED TYPE: ICD-10-CM

## 2025-01-17 DIAGNOSIS — F33.42 MDD (MAJOR DEPRESSIVE DISORDER), RECURRENT, IN FULL REMISSION (HCC): ICD-10-CM

## 2025-01-17 PROCEDURE — 96127 BRIEF EMOTIONAL/BEHAV ASSMT: CPT | Mod: 95 | Performed by: PSYCHIATRY & NEUROLOGY

## 2025-01-17 PROCEDURE — 99214 OFFICE O/P EST MOD 30 MIN: CPT | Mod: 95 | Performed by: PSYCHIATRY & NEUROLOGY

## 2025-01-17 ASSESSMENT — PATIENT HEALTH QUESTIONNAIRE - PHQ9
4. FEELING TIRED OR HAVING LITTLE ENERGY: 0
2. FEELING DOWN, DEPRESSED, IRRITABLE, OR HOPELESS: NOT AT ALL
9. THOUGHTS THAT YOU WOULD BE BETTER OFF DEAD, OR OF HURTING YOURSELF: 0
9. THOUGHTS THAT YOU WOULD BE BETTER OFF DEAD, OR OF HURTING YOURSELF: NOT AT ALL
5. POOR APPETITE OR OVEREATING: NOT AT ALL
3. TROUBLE FALLING OR STAYING ASLEEP OR SLEEPING TOO MUCH: NOT AT ALL
SUM OF ALL RESPONSES TO PHQ QUESTIONS 1-9: 0
4. FEELING TIRED OR HAVING LITTLE ENERGY: NOT AT ALL
8. MOVING OR SPEAKING SO SLOWLY THAT OTHER PEOPLE COULD HAVE NOTICED. OR THE OPPOSITE, BEING SO FIGETY OR RESTLESS THAT YOU HAVE BEEN MOVING AROUND A LOT MORE THAN USUAL: 0
7. TROUBLE CONCENTRATING ON THINGS, SUCH AS READING THE NEWSPAPER OR WATCHING TELEVISION: NOT AT ALL
6. FEELING BAD ABOUT YOURSELF - OR THAT YOU ARE A FAILURE OR HAVE LET YOURSELF OR YOUR FAMILY DOWN: 0
8. MOVING OR SPEAKING SO SLOWLY THAT OTHER PEOPLE COULD HAVE NOTICED. OR THE OPPOSITE, BEING SO FIGETY OR RESTLESS THAT YOU HAVE BEEN MOVING AROUND A LOT MORE THAN USUAL: NOT AT ALL
SUM OF ALL RESPONSES TO PHQ QUESTIONS 1-9: 0
10. IF YOU CHECKED OFF ANY PROBLEMS, HOW DIFFICULT HAVE THESE PROBLEMS MADE IT FOR YOU TO DO YOUR WORK, TAKE CARE OF THINGS AT HOME, OR GET ALONG WITH OTHER PEOPLE: NOT DIFFICULT AT ALL
1. LITTLE INTEREST OR PLEASURE IN DOING THINGS: 0
7. TROUBLE CONCENTRATING ON THINGS, SUCH AS READING THE NEWSPAPER OR WATCHING TELEVISION: 0
6. FEELING BAD ABOUT YOURSELF - OR THAT YOU ARE A FAILURE OR HAVE LET YOURSELF OR YOUR FAMILY DOWN: NOT AT ALL
1. LITTLE INTEREST OR PLEASURE IN DOING THINGS: NOT AT ALL
2. FEELING DOWN, DEPRESSED, IRRITABLE, OR HOPELESS: 0
5. POOR APPETITE OR OVEREATING: 0
3. TROUBLE FALLING OR STAYING ASLEEP OR SLEEPING TOO MUCH: 0
5. POOR APPETITE OR OVEREATING: 0 - NOT AT ALL
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
## 2025-01-25 ENCOUNTER — PHARMACY VISIT (OUTPATIENT)
Dept: PHARMACY | Facility: MEDICAL CENTER | Age: 30
End: 2025-01-25
Payer: COMMERCIAL

## 2025-01-25 PROCEDURE — RXMED WILLOW AMBULATORY MEDICATION CHARGE: Performed by: PHYSICAL MEDICINE & REHABILITATION

## 2025-01-25 RX ORDER — DIAZEPAM 10 MG/1
TABLET ORAL
Qty: 1 TABLET | Refills: 0 | OUTPATIENT
Start: 2025-01-25

## 2025-02-24 ENCOUNTER — PHARMACY VISIT (OUTPATIENT)
Dept: PHARMACY | Facility: MEDICAL CENTER | Age: 30
End: 2025-02-24
Payer: COMMERCIAL

## 2025-02-24 ENCOUNTER — OFFICE VISIT (OUTPATIENT)
Dept: URGENT CARE | Facility: PHYSICIAN GROUP | Age: 30
End: 2025-02-24
Payer: COMMERCIAL

## 2025-02-24 VITALS
OXYGEN SATURATION: 97 % | WEIGHT: 149.6 LBS | SYSTOLIC BLOOD PRESSURE: 106 MMHG | DIASTOLIC BLOOD PRESSURE: 64 MMHG | HEIGHT: 59 IN | RESPIRATION RATE: 18 BRPM | BODY MASS INDEX: 30.16 KG/M2 | TEMPERATURE: 97.8 F | HEART RATE: 96 BPM

## 2025-02-24 DIAGNOSIS — M54.2 NECK PAIN: ICD-10-CM

## 2025-02-24 PROCEDURE — 3078F DIAST BP <80 MM HG: CPT

## 2025-02-24 PROCEDURE — 3074F SYST BP LT 130 MM HG: CPT

## 2025-02-24 PROCEDURE — 99213 OFFICE O/P EST LOW 20 MIN: CPT

## 2025-02-24 PROCEDURE — RXMED WILLOW AMBULATORY MEDICATION CHARGE

## 2025-02-24 RX ORDER — PREDNISONE 20 MG/1
40 TABLET ORAL DAILY
Qty: 10 TABLET | Refills: 0 | Status: SHIPPED | OUTPATIENT
Start: 2025-02-24 | End: 2025-03-01

## 2025-02-24 RX ORDER — CYCLOBENZAPRINE HCL 5 MG
5-10 TABLET ORAL 3 TIMES DAILY PRN
Qty: 20 TABLET | Refills: 0 | Status: SHIPPED | OUTPATIENT
Start: 2025-02-24

## 2025-02-24 ASSESSMENT — FIBROSIS 4 INDEX: FIB4 SCORE: 0.51

## 2025-02-24 ASSESSMENT — ENCOUNTER SYMPTOMS: NECK PAIN: 1
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
## 2025-02-24 NOTE — LETTER
February 24, 2025    To Whom It May Concern:         This is confirmation that Lucy Alatorre Mikaela attended her scheduled appointment with JENIFFER Cano on 2/24/25. Please excuse her from work 2/24/25-2/25/25.          If you have any questions please do not hesitate to call me at the phone number listed below.    Sincerely,          Rosie Rey A.P.R.N.  [phone]

## 2025-03-05 PROCEDURE — RXMED WILLOW AMBULATORY MEDICATION CHARGE: Performed by: PHYSICAL MEDICINE & REHABILITATION

## 2025-03-06 ENCOUNTER — PHARMACY VISIT (OUTPATIENT)
Dept: PHARMACY | Facility: MEDICAL CENTER | Age: 30
End: 2025-03-06
Payer: COMMERCIAL

## 2025-03-20 PROCEDURE — RXMED WILLOW AMBULATORY MEDICATION CHARGE: Performed by: OBSTETRICS & GYNECOLOGY

## 2025-03-23 ENCOUNTER — PHARMACY VISIT (OUTPATIENT)
Dept: PHARMACY | Facility: MEDICAL CENTER | Age: 30
End: 2025-03-23
Payer: COMMERCIAL

## 2025-04-26 ENCOUNTER — PHARMACY VISIT (OUTPATIENT)
Dept: PHARMACY | Facility: MEDICAL CENTER | Age: 30
End: 2025-04-26
Payer: COMMERCIAL

## 2025-04-26 PROCEDURE — RXMED WILLOW AMBULATORY MEDICATION CHARGE: Performed by: NURSE PRACTITIONER

## 2025-04-26 RX ORDER — ONDANSETRON 4 MG/1
TABLET, ORALLY DISINTEGRATING ORAL
Qty: 30 TABLET | Refills: 1 | OUTPATIENT
Start: 2025-04-26

## 2025-04-29 ENCOUNTER — TELEMEDICINE (OUTPATIENT)
Dept: BEHAVIORAL HEALTH | Facility: CLINIC | Age: 30
End: 2025-04-29
Payer: COMMERCIAL

## 2025-04-29 DIAGNOSIS — F33.42 MDD (MAJOR DEPRESSIVE DISORDER), RECURRENT, IN FULL REMISSION (HCC): ICD-10-CM

## 2025-04-29 DIAGNOSIS — F41.9 ANXIETY DISORDER, UNSPECIFIED TYPE: ICD-10-CM

## 2025-04-29 PROBLEM — M47.812 CERVICAL SPONDYLOSIS WITHOUT MYELOPATHY: Status: ACTIVE | Noted: 2024-12-01

## 2025-04-29 PROBLEM — M54.12 CERVICAL RADICULOPATHY: Status: ACTIVE | Noted: 2024-12-01

## 2025-04-29 PROBLEM — M54.16 LUMBAR RADICULOPATHY: Status: ACTIVE | Noted: 2024-12-01

## 2025-04-29 NOTE — PROGRESS NOTES
"PSYCHIATRY VIRTUAL VISIT FOLLOW-UP NOTE    Chief Complaint   Patient presents with    Follow-Up     Depression, anxiety     This evaluation was conducted via Teams using secure and encrypted videoconferencing technology.   The patient was in their home in the [facility].    The patient's identity was confirmed and verbal consent was obtained for this virtual visit.    History Of Present Illness:  Lucy Al is a 29 y.o. female with major depressive disorder, anxiety disorder, opioid use disorder comes in today for follow up, was last seen over 3 months ago.  She has noticed some anxiety and depression in the context of political scenario.  She has been staying on top of things that have been going on politically and a lot of stuff has been bothering her.  She has different opinion compared to her best friend and she is having a harder time connecting with her over this.  She tapered herself off Zoloft and has been completely off it for a couple of months.  She has been staying in touch with her parents and has good support from her boyfriend as well.  She denies any new work-related stressors.  She denies having thoughts of wanting to hurt herself.    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    Substance Use:  Alcohol - Denies  Nicotine - Denies   Cannabis - Smokes THC daily  Illicit drugs - Denies     Past Medication Trials:  Zoloft 100 mg x 5+ years (effective), Prozac at age 16 (\"never gave it a chance\"), Wellbutrin at age 16 (\"never gave it a chance\")    Psychological testing at [facility] - symptoms not consistent with ADHD (per patient, haven't received official records)    Medications:  Current Outpatient Medications   Medication Sig Dispense Refill    ondansetron (ZOFRAN ODT) 4 MG TABLET DISPERSIBLE Take 1 tablet " "by mouth every 8 hours as needed for nausea and vomiting 30 Tablet 1    drospirenone-ethinyl estradiol (AFRICA) 3-0.02 MG per tablet Take 1 tablet Orally Once a day 84 days 84 Tablet 4    VITAMIN D PO Take 2,000 Units by mouth every day.      diclofenac sodium (VOLTAREN) 1 % Gel APPLY 1-2 GRAMS TO THE AFFECTED AREA(S) BY TOPICAL ROUTE 4 TIMES PER DAY (Patient not taking: Reported on 2025) 100 g 6    meloxicam (MOBIC) 15 MG tablet take 1 tablet by mouth every day as needed for 30 days (Patient not taking: Reported on 2025) 30 Tablet 6    methocarbamol (ROBAXIN) 500 MG Tab Take 1 tablet every 8 hours by oral route as directed for 30 days. (Patient not taking: Reported on 2025) 90 Tablet 6    cyclobenzaprine (FLEXERIL) 5 mg tablet Take 1-2 Tablets by mouth 3 times a day as needed for Moderate Pain. (Patient not taking: Reported on 2025) 20 Tablet 0     No current facility-administered medications for this visit.     Review Of Systems:    Psychiatric - Positive for infrequent anxiety, depression    Physical Examination:  Vital signs: There were no vitals taken for this visit.    Musculoskeletal: No abnormal movements.     Mental Status Evaluation:   General: Young female, dressed in casual attire, good grooming and hygiene, in no apparent distress, calm and cooperative, good eye contact, psychomotor retardation  Orientation: Alert and oriented to person, place and time  Recent and remote memory: Grossly intact  Attention span and concentration: Grossly intact  Speech: Spontaneous, normal rate, rhythm and tone  Thought Process: Linear, logical and goal directed  Thought Content: Denies suicidal or homicidal ideations, intent or plan  Perception: No delusions noted  Associations: Intact  Language: Appropriate  Fund of knowledge and vocabulary: Grossly adequate  Mood: \"good\"  Affect: Euthymic, mood congruent  Insight: Good  Judgment: Good    Depression screenin/12/2023    11:04 AM 2024    " 11:30 AM 1/17/2025    11:00 AM   Depression Screen (PHQ-2/PHQ-9)   PHQ-2 Total Score 0     PHQ-2 Total Score  0    PHQ-9 Total Score 0     PHQ-9 Total Score   0     Interpretation of PHQ-9 Total Score   Score Severity:  1-4 No Depression   5-9 Mild Depression   10-14 Moderate Depression   15-19 Moderately Severe Depression   20-27 Severe Depression    Medical Records/Labs/Diagnostic Tests Reviewed:  [place] PDMP records - 2 prescription controlled medications in the last 2 years      Impression:  1.  Major depressive disorder, recurrent, in full remission - stable  2.  Anxiety disorder, unspecified type - stable  3.  History of opioid use disorder, moderate, in sustained remission (last used heroin 7/2019 and Oxycodone 4/2021)    Plan:  1.  Continue to monitor depression and anxiety without SSRI/SNRI medications.  Her current struggles are primarily related to policies surrounding politics.  I have advised her to improve self-care and see a therapist on a regular basis.  Discussed how mild symptoms of depression and anxiety are responsive to psychotherapy and self-care.  2.  Continue to maintain sobriety from opioids and other illicit drugs  3.  Continue individual psychotherapy with CECI Hernandez   4.  Provided supportive psychotherapy (16+ minutes): Feelings surrounding politics and how they are affecting her, encouraged focusing on things that are in her control and self-care and prioritizing her health over politics.    Return to clinic on as needed basis symptoms worsen    The proposed treatment plan was discussed with the patient who was provided the opportunity to ask questions and make suggestions regarding alternative treatment. Patient verbalized understanding and expressed agreement with the plan.     Kira Onofre M.D.  04/29/25    This note was created using voice recognition software (Dragon). The accuracy of the dictation is limited by the abilities of the software. I have reviewed the note prior  to signing, however some errors in grammar and context are still possible. If you have any questions related to this note please do not hesitate to contact our office.

## 2025-06-09 ENCOUNTER — HOSPITAL ENCOUNTER (OUTPATIENT)
Facility: MEDICAL CENTER | Age: 30
End: 2025-06-09
Attending: OBSTETRICS & GYNECOLOGY
Payer: COMMERCIAL

## 2025-06-09 PROCEDURE — 88142 CYTOPATH C/V THIN LAYER: CPT

## 2025-06-09 PROCEDURE — 87624 HPV HI-RISK TYP POOLED RSLT: CPT

## 2025-06-19 LAB
HPV I/H RISK 1 DNA SPEC QL NAA+PROBE: NOT DETECTED
SPECIMEN SOURCE: NORMAL
THINPREP PAP, CYTOLOGY NL11781: NORMAL

## 2025-06-25 ENCOUNTER — HOSPITAL ENCOUNTER (OUTPATIENT)
Facility: MEDICAL CENTER | Age: 30
End: 2025-06-25
Attending: OBSTETRICS & GYNECOLOGY
Payer: COMMERCIAL

## 2025-06-25 ENCOUNTER — PHARMACY VISIT (OUTPATIENT)
Dept: PHARMACY | Facility: MEDICAL CENTER | Age: 30
End: 2025-06-25
Payer: COMMERCIAL

## 2025-06-25 PROCEDURE — 88142 CYTOPATH C/V THIN LAYER: CPT

## 2025-06-25 PROCEDURE — 87661 TRICHOMONAS VAGINALIS AMPLIF: CPT

## 2025-06-25 PROCEDURE — RXMED WILLOW AMBULATORY MEDICATION CHARGE: Performed by: NURSE PRACTITIONER

## 2025-06-25 PROCEDURE — RXMED WILLOW AMBULATORY MEDICATION CHARGE: Performed by: OBSTETRICS & GYNECOLOGY

## 2025-06-25 PROCEDURE — 87591 N.GONORRHOEAE DNA AMP PROB: CPT

## 2025-06-25 PROCEDURE — 87491 CHLMYD TRACH DNA AMP PROBE: CPT

## 2025-06-26 LAB
C TRACH DNA GENITAL QL NAA+PROBE: NEGATIVE
N GONORRHOEA DNA GENITAL QL NAA+PROBE: NEGATIVE
SPECIMEN SOURCE: NORMAL

## 2025-06-28 LAB
SPEC CONTAINER SPEC: NORMAL
SPECIMEN SOURCE: NORMAL
T VAGINALIS RRNA SPEC QL NAA+PROBE: NEGATIVE

## 2025-07-08 LAB — THINPREP PAP, CYTOLOGY NL11781: NORMAL

## 2025-07-09 LAB
HPV I/H RISK 1 DNA SPEC QL NAA+PROBE: NOT DETECTED
SPECIMEN SOURCE: NORMAL

## 2025-07-15 ENCOUNTER — OFFICE VISIT (OUTPATIENT)
Dept: MEDICAL GROUP | Facility: PHYSICIAN GROUP | Age: 30
End: 2025-07-15
Payer: COMMERCIAL

## 2025-07-15 VITALS
BODY MASS INDEX: 25.14 KG/M2 | WEIGHT: 124.7 LBS | HEART RATE: 76 BPM | DIASTOLIC BLOOD PRESSURE: 80 MMHG | OXYGEN SATURATION: 98 % | SYSTOLIC BLOOD PRESSURE: 96 MMHG | TEMPERATURE: 97.8 F | HEIGHT: 59 IN

## 2025-07-15 DIAGNOSIS — E55.9 VITAMIN D DEFICIENCY: ICD-10-CM

## 2025-07-15 DIAGNOSIS — Z00.00 ENCOUNTER FOR WELL ADULT EXAM WITHOUT ABNORMAL FINDINGS: ICD-10-CM

## 2025-07-15 DIAGNOSIS — E66.3 OVERWEIGHT (BMI 25.0-29.9): ICD-10-CM

## 2025-07-15 DIAGNOSIS — Z13.220 SCREENING FOR LIPID DISORDERS: ICD-10-CM

## 2025-07-15 DIAGNOSIS — Z00.00 ROUTINE HEALTH MAINTENANCE: ICD-10-CM

## 2025-07-15 PROCEDURE — 3079F DIAST BP 80-89 MM HG: CPT | Performed by: NURSE PRACTITIONER

## 2025-07-15 PROCEDURE — 99395 PREV VISIT EST AGE 18-39: CPT | Performed by: NURSE PRACTITIONER

## 2025-07-15 PROCEDURE — 3074F SYST BP LT 130 MM HG: CPT | Performed by: NURSE PRACTITIONER

## 2025-07-15 SDOH — HEALTH STABILITY: PHYSICAL HEALTH: ON AVERAGE, HOW MANY MINUTES DO YOU ENGAGE IN EXERCISE AT THIS LEVEL?: 60 MIN

## 2025-07-15 SDOH — ECONOMIC STABILITY: INCOME INSECURITY: HOW HARD IS IT FOR YOU TO PAY FOR THE VERY BASICS LIKE FOOD, HOUSING, MEDICAL CARE, AND HEATING?: NOT VERY HARD

## 2025-07-15 SDOH — HEALTH STABILITY: PHYSICAL HEALTH: ON AVERAGE, HOW MANY DAYS PER WEEK DO YOU ENGAGE IN MODERATE TO STRENUOUS EXERCISE (LIKE A BRISK WALK)?: 2 DAYS

## 2025-07-15 SDOH — ECONOMIC STABILITY: FOOD INSECURITY: WITHIN THE PAST 12 MONTHS, YOU WORRIED THAT YOUR FOOD WOULD RUN OUT BEFORE YOU GOT MONEY TO BUY MORE.: NEVER TRUE

## 2025-07-15 SDOH — ECONOMIC STABILITY: FOOD INSECURITY: WITHIN THE PAST 12 MONTHS, THE FOOD YOU BOUGHT JUST DIDN'T LAST AND YOU DIDN'T HAVE MONEY TO GET MORE.: NEVER TRUE

## 2025-07-15 SDOH — ECONOMIC STABILITY: INCOME INSECURITY: IN THE LAST 12 MONTHS, WAS THERE A TIME WHEN YOU WERE NOT ABLE TO PAY THE MORTGAGE OR RENT ON TIME?: NO

## 2025-07-15 SDOH — HEALTH STABILITY: MENTAL HEALTH
STRESS IS WHEN SOMEONE FEELS TENSE, NERVOUS, ANXIOUS, OR CAN'T SLEEP AT NIGHT BECAUSE THEIR MIND IS TROUBLED. HOW STRESSED ARE YOU?: ONLY A LITTLE

## 2025-07-15 ASSESSMENT — SOCIAL DETERMINANTS OF HEALTH (SDOH)
HOW OFTEN DO YOU HAVE A DRINK CONTAINING ALCOHOL: NEVER
DO YOU BELONG TO ANY CLUBS OR ORGANIZATIONS SUCH AS CHURCH GROUPS UNIONS, FRATERNAL OR ATHLETIC GROUPS, OR SCHOOL GROUPS?: NO
HOW OFTEN DO YOU ATTENT MEETINGS OF THE CLUB OR ORGANIZATION YOU BELONG TO?: NEVER
IN A TYPICAL WEEK, HOW MANY TIMES DO YOU TALK ON THE PHONE WITH FAMILY, FRIENDS, OR NEIGHBORS?: TWICE A WEEK
DO YOU BELONG TO ANY CLUBS OR ORGANIZATIONS SUCH AS CHURCH GROUPS UNIONS, FRATERNAL OR ATHLETIC GROUPS, OR SCHOOL GROUPS?: NO
WITHIN THE PAST 12 MONTHS, YOU WORRIED THAT YOUR FOOD WOULD RUN OUT BEFORE YOU GOT THE MONEY TO BUY MORE: NEVER TRUE
IN THE PAST 12 MONTHS, HAS THE ELECTRIC, GAS, OIL, OR WATER COMPANY THREATENED TO SHUT OFF SERVICE IN YOUR HOME?: NO
HOW OFTEN DO YOU GET TOGETHER WITH FRIENDS OR RELATIVES?: ONCE A WEEK
HOW OFTEN DO YOU ATTENT MEETINGS OF THE CLUB OR ORGANIZATION YOU BELONG TO?: NEVER
HOW OFTEN DO YOU ATTEND CHURCH OR RELIGIOUS SERVICES?: NEVER
ARE YOU MARRIED, WIDOWED, DIVORCED, SEPARATED, NEVER MARRIED, OR LIVING WITH A PARTNER?: LIVING WITH PARTNER
ARE YOU MARRIED, WIDOWED, DIVORCED, SEPARATED, NEVER MARRIED, OR LIVING WITH A PARTNER?: LIVING WITH PARTNER
HOW HARD IS IT FOR YOU TO PAY FOR THE VERY BASICS LIKE FOOD, HOUSING, MEDICAL CARE, AND HEATING?: NOT VERY HARD
HOW OFTEN DO YOU GET TOGETHER WITH FRIENDS OR RELATIVES?: ONCE A WEEK
HOW OFTEN DO YOU ATTEND CHURCH OR RELIGIOUS SERVICES?: NEVER
HOW OFTEN DO YOU HAVE SIX OR MORE DRINKS ON ONE OCCASION: NEVER
HOW MANY DRINKS CONTAINING ALCOHOL DO YOU HAVE ON A TYPICAL DAY WHEN YOU ARE DRINKING: PATIENT DOES NOT DRINK
IN A TYPICAL WEEK, HOW MANY TIMES DO YOU TALK ON THE PHONE WITH FAMILY, FRIENDS, OR NEIGHBORS?: TWICE A WEEK

## 2025-07-15 ASSESSMENT — LIFESTYLE VARIABLES
AUDIT-C TOTAL SCORE: 0
HOW OFTEN DO YOU HAVE SIX OR MORE DRINKS ON ONE OCCASION: NEVER
SKIP TO QUESTIONS 9-10: 1
HOW MANY STANDARD DRINKS CONTAINING ALCOHOL DO YOU HAVE ON A TYPICAL DAY: PATIENT DOES NOT DRINK
HOW OFTEN DO YOU HAVE A DRINK CONTAINING ALCOHOL: NEVER

## 2025-07-15 ASSESSMENT — FIBROSIS 4 INDEX: FIB4 SCORE: 0.51
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
## 2025-07-18 ENCOUNTER — HOSPITAL ENCOUNTER (OUTPATIENT)
Dept: LAB | Facility: MEDICAL CENTER | Age: 30
End: 2025-07-18
Attending: NURSE PRACTITIONER
Payer: COMMERCIAL

## 2025-07-18 DIAGNOSIS — Z00.00 ROUTINE HEALTH MAINTENANCE: ICD-10-CM

## 2025-07-18 DIAGNOSIS — Z13.220 SCREENING FOR LIPID DISORDERS: ICD-10-CM

## 2025-07-18 DIAGNOSIS — E55.9 VITAMIN D DEFICIENCY: ICD-10-CM

## 2025-07-18 DIAGNOSIS — E66.3 OVERWEIGHT (BMI 25.0-29.9): ICD-10-CM

## 2025-07-18 LAB
25(OH)D3 SERPL-MCNC: 57 NG/ML (ref 30–100)
ALBUMIN SERPL BCP-MCNC: 3.9 G/DL (ref 3.2–4.9)
ALBUMIN/GLOB SERPL: 1.4 G/DL
ALP SERPL-CCNC: 76 U/L (ref 30–99)
ALT SERPL-CCNC: 21 U/L (ref 2–50)
ANION GAP SERPL CALC-SCNC: 11 MMOL/L (ref 7–16)
AST SERPL-CCNC: 23 U/L (ref 12–45)
BASOPHILS # BLD AUTO: 0.9 % (ref 0–1.8)
BASOPHILS # BLD: 0.06 K/UL (ref 0–0.12)
BILIRUB SERPL-MCNC: 0.3 MG/DL (ref 0.1–1.5)
BUN SERPL-MCNC: 6 MG/DL (ref 8–22)
CALCIUM ALBUM COR SERPL-MCNC: 9.4 MG/DL (ref 8.5–10.5)
CALCIUM SERPL-MCNC: 9.3 MG/DL (ref 8.5–10.5)
CHLORIDE SERPL-SCNC: 105 MMOL/L (ref 96–112)
CHOLEST SERPL-MCNC: 142 MG/DL (ref 100–199)
CO2 SERPL-SCNC: 21 MMOL/L (ref 20–33)
CREAT SERPL-MCNC: 0.73 MG/DL (ref 0.5–1.4)
EOSINOPHIL # BLD AUTO: 0.06 K/UL (ref 0–0.51)
EOSINOPHIL NFR BLD: 0.9 % (ref 0–6.9)
ERYTHROCYTE [DISTWIDTH] IN BLOOD BY AUTOMATED COUNT: 42 FL (ref 35.9–50)
FASTING STATUS PATIENT QL REPORTED: NORMAL
GFR SERPLBLD CREATININE-BSD FMLA CKD-EPI: 114 ML/MIN/1.73 M 2
GLOBULIN SER CALC-MCNC: 2.8 G/DL (ref 1.9–3.5)
GLUCOSE SERPL-MCNC: 83 MG/DL (ref 65–99)
HCT VFR BLD AUTO: 42.1 % (ref 37–47)
HDLC SERPL-MCNC: 78 MG/DL
HGB BLD-MCNC: 13.2 G/DL (ref 12–16)
IMM GRANULOCYTES # BLD AUTO: 0.03 K/UL (ref 0–0.11)
IMM GRANULOCYTES NFR BLD AUTO: 0.4 % (ref 0–0.9)
LDLC SERPL CALC-MCNC: 42 MG/DL
LYMPHOCYTES # BLD AUTO: 3.26 K/UL (ref 1–4.8)
LYMPHOCYTES NFR BLD: 46.8 % (ref 22–41)
MCH RBC QN AUTO: 25.9 PG (ref 27–33)
MCHC RBC AUTO-ENTMCNC: 31.4 G/DL (ref 32.2–35.5)
MCV RBC AUTO: 82.7 FL (ref 81.4–97.8)
MONOCYTES # BLD AUTO: 0.62 K/UL (ref 0–0.85)
MONOCYTES NFR BLD AUTO: 8.9 % (ref 0–13.4)
NEUTROPHILS # BLD AUTO: 2.94 K/UL (ref 1.82–7.42)
NEUTROPHILS NFR BLD: 42.1 % (ref 44–72)
NRBC # BLD AUTO: 0 K/UL
NRBC BLD-RTO: 0 /100 WBC (ref 0–0.2)
PLATELET # BLD AUTO: 214 K/UL (ref 164–446)
PMV BLD AUTO: 11.2 FL (ref 9–12.9)
POTASSIUM SERPL-SCNC: 4.2 MMOL/L (ref 3.6–5.5)
PROT SERPL-MCNC: 6.7 G/DL (ref 6–8.2)
RBC # BLD AUTO: 5.09 M/UL (ref 4.2–5.4)
SODIUM SERPL-SCNC: 137 MMOL/L (ref 135–145)
TRIGL SERPL-MCNC: 111 MG/DL (ref 0–149)
TSH SERPL DL<=0.005 MIU/L-ACNC: 1.86 UIU/ML (ref 0.38–5.33)
WBC # BLD AUTO: 7 K/UL (ref 4.8–10.8)

## 2025-07-18 PROCEDURE — 85025 COMPLETE CBC W/AUTO DIFF WBC: CPT

## 2025-07-18 PROCEDURE — 80061 LIPID PANEL: CPT

## 2025-07-18 PROCEDURE — 36415 COLL VENOUS BLD VENIPUNCTURE: CPT

## 2025-07-18 PROCEDURE — 84443 ASSAY THYROID STIM HORMONE: CPT

## 2025-07-18 PROCEDURE — 82306 VITAMIN D 25 HYDROXY: CPT

## 2025-07-18 PROCEDURE — 80053 COMPREHEN METABOLIC PANEL: CPT

## 2025-07-24 DIAGNOSIS — R20.2 NUMBNESS AND TINGLING OF RIGHT ARM: ICD-10-CM

## 2025-07-24 DIAGNOSIS — Z13.220 SCREENING FOR LIPID DISORDERS: ICD-10-CM

## 2025-07-24 DIAGNOSIS — E55.9 VITAMIN D DEFICIENCY: ICD-10-CM

## 2025-07-24 DIAGNOSIS — R20.0 NUMBNESS AND TINGLING OF RIGHT ARM: ICD-10-CM

## 2025-07-24 DIAGNOSIS — N91.2 AMENORRHEA: Primary | ICD-10-CM

## 2025-07-24 DIAGNOSIS — Z00.00 ROUTINE HEALTH MAINTENANCE: ICD-10-CM

## 2025-07-24 DIAGNOSIS — E66.3 OVERWEIGHT (BMI 25.0-29.9): ICD-10-CM

## 2025-07-24 NOTE — PROGRESS NOTES
1. Amenorrhea  - CBC WITH DIFFERENTIAL; Future  - TSH WITH REFLEX TO FT4; Future    2. Numbness and tingling of right arm  - CBC WITH DIFFERENTIAL; Future  - Comp Metabolic Panel; Future  - TSH WITH REFLEX TO FT4; Future  - VITAMIN D,25 HYDROXY (DEFICIENCY); Future    3. Overweight (BMI 25.0-29.9)  - CBC WITH DIFFERENTIAL; Future  - Comp Metabolic Panel; Future  - Lipid Profile; Future  - TSH WITH REFLEX TO FT4; Future    4. Vitamin D deficiency  - VITAMIN D,25 HYDROXY (DEFICIENCY); Future    5. Screening for lipid disorders  - Comp Metabolic Panel; Future  - Lipid Profile; Future  - TSH WITH REFLEX TO FT4; Future    6. Routine health maintenance  - CBC WITH DIFFERENTIAL; Future  - Comp Metabolic Panel; Future  - Lipid Profile; Future  - TSH WITH REFLEX TO FT4; Future  - VITAMIN D,25 HYDROXY (DEFICIENCY); Future     Due for updated annual labs prior to annual follow up in July 2026.